# Patient Record
Sex: FEMALE | Race: WHITE | NOT HISPANIC OR LATINO
[De-identification: names, ages, dates, MRNs, and addresses within clinical notes are randomized per-mention and may not be internally consistent; named-entity substitution may affect disease eponyms.]

---

## 2017-01-26 ENCOUNTER — NON-APPOINTMENT (OUTPATIENT)
Age: 77
End: 2017-01-26

## 2017-01-26 ENCOUNTER — APPOINTMENT (OUTPATIENT)
Dept: INTERNAL MEDICINE | Facility: CLINIC | Age: 77
End: 2017-01-26

## 2017-01-26 VITALS — WEIGHT: 133 LBS | BODY MASS INDEX: 22.16 KG/M2 | HEIGHT: 65 IN

## 2017-01-26 DIAGNOSIS — H93.11 TINNITUS, RIGHT EAR: ICD-10-CM

## 2017-02-03 LAB
25(OH)D3 SERPL-MCNC: 55.7 NG/ML
ALBUMIN SERPL ELPH-MCNC: 4.3 G/DL
ALP BLD-CCNC: 50 U/L
ALT SERPL-CCNC: 27 U/L
ANION GAP SERPL CALC-SCNC: 17 MMOL/L
APPEARANCE: CLEAR
AST SERPL-CCNC: 31 U/L
BASOPHILS # BLD AUTO: 0.06 K/UL
BASOPHILS NFR BLD AUTO: 0.7 %
BILIRUB SERPL-MCNC: 0.4 MG/DL
BILIRUBIN URINE: NEGATIVE
BLOOD URINE: NEGATIVE
BUN SERPL-MCNC: 18 MG/DL
CALCIUM SERPL-MCNC: 9.7 MG/DL
CHLORIDE SERPL-SCNC: 104 MMOL/L
CHOLEST SERPL-MCNC: 264 MG/DL
CHOLEST/HDLC SERPL: 1.8 RATIO
CO2 SERPL-SCNC: 22 MMOL/L
COLOR: YELLOW
CREAT SERPL-MCNC: 1.1 MG/DL
CRP SERPL HS-MCNC: 0.7 MG/L
EOSINOPHIL # BLD AUTO: 0.35 K/UL
EOSINOPHIL NFR BLD AUTO: 4 %
GLUCOSE BS SERPL-MCNC: 93 MG/DL
GLUCOSE QUALITATIVE U: NORMAL MG/DL
GLUCOSE SERPL-MCNC: 92 MG/DL
HBA1C MFR BLD HPLC: 5.2 %
HCT VFR BLD CALC: 43.3 %
HDLC SERPL-MCNC: 138 MG/DL
HGB BLD-MCNC: 14 G/DL
IMM GRANULOCYTES NFR BLD AUTO: 0.1 %
KETONES URINE: NEGATIVE
LDLC SERPL CALC-MCNC: 101 MG/DL
LEUKOCYTE ESTERASE URINE: NEGATIVE
LYMPHOCYTES # BLD AUTO: 1.71 K/UL
LYMPHOCYTES NFR BLD AUTO: 19.4 %
MAN DIFF?: NORMAL
MCHC RBC-ENTMCNC: 31.9 PG
MCHC RBC-ENTMCNC: 32.3 GM/DL
MCV RBC AUTO: 98.6 FL
MONOCYTES # BLD AUTO: 0.76 K/UL
MONOCYTES NFR BLD AUTO: 8.6 %
NEUTROPHILS # BLD AUTO: 5.91 K/UL
NEUTROPHILS NFR BLD AUTO: 67.2 %
NITRITE URINE: NEGATIVE
PH URINE: 6
PLATELET # BLD AUTO: 240 K/UL
POTASSIUM SERPL-SCNC: 4.4 MMOL/L
PROT SERPL-MCNC: 7.1 G/DL
PROTEIN URINE: NEGATIVE MG/DL
RBC # BLD: 4.39 M/UL
RBC # FLD: 13.7 %
SODIUM SERPL-SCNC: 143 MMOL/L
SPECIFIC GRAVITY URINE: 1.02
T4 FREE SERPL-MCNC: 1.2 NG/DL
TRIGL SERPL-MCNC: 94 MG/DL
TSH SERPL-ACNC: 4.68 UIU/ML
UROBILINOGEN URINE: NORMAL MG/DL
VIT B12 SERPL-MCNC: 1535 PG/ML
WBC # FLD AUTO: 8.8 K/UL

## 2017-03-16 ENCOUNTER — APPOINTMENT (OUTPATIENT)
Dept: INTERNAL MEDICINE | Facility: CLINIC | Age: 77
End: 2017-03-16

## 2017-03-16 VITALS
HEIGHT: 65 IN | DIASTOLIC BLOOD PRESSURE: 78 MMHG | WEIGHT: 133 LBS | SYSTOLIC BLOOD PRESSURE: 136 MMHG | BODY MASS INDEX: 22.16 KG/M2 | RESPIRATION RATE: 14 BRPM | HEART RATE: 78 BPM

## 2017-03-31 ENCOUNTER — APPOINTMENT (OUTPATIENT)
Dept: OBGYN | Facility: CLINIC | Age: 77
End: 2017-03-31

## 2017-03-31 VITALS
WEIGHT: 136 LBS | SYSTOLIC BLOOD PRESSURE: 132 MMHG | HEIGHT: 65 IN | BODY MASS INDEX: 22.66 KG/M2 | HEART RATE: 70 BPM | DIASTOLIC BLOOD PRESSURE: 85 MMHG | OXYGEN SATURATION: 96 %

## 2017-03-31 RX ORDER — LISINOPRIL 10 MG/1
10 TABLET ORAL DAILY
Qty: 30 | Refills: 1 | Status: DISCONTINUED | COMMUNITY
Start: 2017-01-26 | End: 2017-03-31

## 2017-04-30 ENCOUNTER — FORM ENCOUNTER (OUTPATIENT)
Age: 77
End: 2017-04-30

## 2017-05-01 ENCOUNTER — APPOINTMENT (OUTPATIENT)
Dept: ULTRASOUND IMAGING | Facility: HOSPITAL | Age: 77
End: 2017-05-01

## 2017-05-01 ENCOUNTER — APPOINTMENT (OUTPATIENT)
Dept: MAMMOGRAPHY | Facility: HOSPITAL | Age: 77
End: 2017-05-01

## 2017-05-01 ENCOUNTER — OUTPATIENT (OUTPATIENT)
Dept: OUTPATIENT SERVICES | Facility: HOSPITAL | Age: 77
LOS: 1 days | End: 2017-05-01
Payer: MEDICARE

## 2017-05-01 PROCEDURE — 77063 BREAST TOMOSYNTHESIS BI: CPT

## 2017-05-01 PROCEDURE — 76641 ULTRASOUND BREAST COMPLETE: CPT

## 2017-05-01 PROCEDURE — 77067 SCR MAMMO BI INCL CAD: CPT

## 2017-07-03 ENCOUNTER — APPOINTMENT (OUTPATIENT)
Dept: ORTHOPEDIC SURGERY | Facility: CLINIC | Age: 77
End: 2017-07-03

## 2017-07-03 VITALS
BODY MASS INDEX: 22.33 KG/M2 | SYSTOLIC BLOOD PRESSURE: 162 MMHG | WEIGHT: 134 LBS | DIASTOLIC BLOOD PRESSURE: 81 MMHG | HEIGHT: 65 IN | HEART RATE: 73 BPM

## 2017-07-04 ENCOUNTER — RESULT REVIEW (OUTPATIENT)
Age: 77
End: 2017-07-04

## 2017-07-04 ENCOUNTER — TRANSCRIPTION ENCOUNTER (OUTPATIENT)
Age: 77
End: 2017-07-04

## 2017-07-04 ENCOUNTER — INPATIENT (INPATIENT)
Facility: HOSPITAL | Age: 77
LOS: 4 days | Discharge: HOME | DRG: 331 | End: 2017-07-09
Attending: SURGERY | Admitting: SURGERY
Payer: MEDICARE

## 2017-07-04 DIAGNOSIS — K56.2 VOLVULUS: ICD-10-CM

## 2017-07-04 DIAGNOSIS — I10 ESSENTIAL (PRIMARY) HYPERTENSION: ICD-10-CM

## 2017-07-04 DIAGNOSIS — Z85.038 PERSONAL HISTORY OF OTHER MALIGNANT NEOPLASM OF LARGE INTESTINE: ICD-10-CM

## 2017-07-04 DIAGNOSIS — R10.9 UNSPECIFIED ABDOMINAL PAIN: ICD-10-CM

## 2017-07-04 PROCEDURE — 88307 TISSUE EXAM BY PATHOLOGIST: CPT | Mod: 26

## 2017-07-04 PROCEDURE — 93010 ELECTROCARDIOGRAM REPORT: CPT

## 2017-07-04 PROCEDURE — 99223 1ST HOSP IP/OBS HIGH 75: CPT | Mod: 57,AI

## 2017-07-04 PROCEDURE — 74177 CT ABD & PELVIS W/CONTRAST: CPT | Mod: 26

## 2017-07-04 PROCEDURE — 44140 PARTIAL REMOVAL OF COLON: CPT

## 2017-07-05 PROCEDURE — 99235 HOSP IP/OBS SAME DATE MOD 70: CPT

## 2017-07-06 PROCEDURE — 99233 SBSQ HOSP IP/OBS HIGH 50: CPT

## 2017-07-07 PROCEDURE — 99232 SBSQ HOSP IP/OBS MODERATE 35: CPT

## 2017-07-08 PROCEDURE — 99232 SBSQ HOSP IP/OBS MODERATE 35: CPT

## 2017-07-09 PROCEDURE — 87086 URINE CULTURE/COLONY COUNT: CPT

## 2017-07-09 PROCEDURE — 85730 THROMBOPLASTIN TIME PARTIAL: CPT

## 2017-07-09 PROCEDURE — 74177 CT ABD & PELVIS W/CONTRAST: CPT

## 2017-07-09 PROCEDURE — 87075 CULTR BACTERIA EXCEPT BLOOD: CPT

## 2017-07-09 PROCEDURE — 86850 RBC ANTIBODY SCREEN: CPT

## 2017-07-09 PROCEDURE — 99285 EMERGENCY DEPT VISIT HI MDM: CPT | Mod: 25

## 2017-07-09 PROCEDURE — 97162 PT EVAL MOD COMPLEX 30 MIN: CPT

## 2017-07-09 PROCEDURE — 86900 BLOOD TYPING SEROLOGIC ABO: CPT

## 2017-07-09 PROCEDURE — 80053 COMPREHEN METABOLIC PANEL: CPT

## 2017-07-09 PROCEDURE — 96375 TX/PRO/DX INJ NEW DRUG ADDON: CPT

## 2017-07-09 PROCEDURE — 81003 URINALYSIS AUTO W/O SCOPE: CPT

## 2017-07-09 PROCEDURE — 99232 SBSQ HOSP IP/OBS MODERATE 35: CPT

## 2017-07-09 PROCEDURE — 85610 PROTHROMBIN TIME: CPT

## 2017-07-09 PROCEDURE — 82150 ASSAY OF AMYLASE: CPT

## 2017-07-09 PROCEDURE — 86922 COMPATIBILITY TEST ANTIGLOB: CPT

## 2017-07-09 PROCEDURE — 83690 ASSAY OF LIPASE: CPT

## 2017-07-09 PROCEDURE — 93005 ELECTROCARDIOGRAM TRACING: CPT

## 2017-07-09 PROCEDURE — 87205 SMEAR GRAM STAIN: CPT

## 2017-07-09 PROCEDURE — 87070 CULTURE OTHR SPECIMN AEROBIC: CPT

## 2017-07-09 PROCEDURE — 96365 THER/PROPH/DIAG IV INF INIT: CPT | Mod: XU

## 2017-07-09 PROCEDURE — 88307 TISSUE EXAM BY PATHOLOGIST: CPT

## 2017-07-09 PROCEDURE — 85027 COMPLETE CBC AUTOMATED: CPT

## 2017-07-09 PROCEDURE — 80069 RENAL FUNCTION PANEL: CPT

## 2017-07-17 ENCOUNTER — APPOINTMENT (OUTPATIENT)
Dept: SURGERY | Facility: CLINIC | Age: 77
End: 2017-07-17

## 2017-07-17 VITALS — BODY MASS INDEX: 21.66 KG/M2 | HEIGHT: 65 IN | WEIGHT: 130 LBS

## 2017-07-28 ENCOUNTER — APPOINTMENT (OUTPATIENT)
Dept: INTERNAL MEDICINE | Facility: CLINIC | Age: 77
End: 2017-07-28
Payer: MEDICARE

## 2017-07-28 VITALS
BODY MASS INDEX: 21.49 KG/M2 | WEIGHT: 129 LBS | HEIGHT: 65 IN | HEART RATE: 72 BPM | RESPIRATION RATE: 14 BRPM | DIASTOLIC BLOOD PRESSURE: 76 MMHG | SYSTOLIC BLOOD PRESSURE: 134 MMHG

## 2017-07-28 PROCEDURE — 99215 OFFICE O/P EST HI 40 MIN: CPT

## 2017-08-09 ENCOUNTER — RX RENEWAL (OUTPATIENT)
Age: 77
End: 2017-08-09

## 2017-08-21 ENCOUNTER — OUTPATIENT (OUTPATIENT)
Dept: OUTPATIENT SERVICES | Facility: HOSPITAL | Age: 77
LOS: 1 days | End: 2017-08-21
Payer: MEDICARE

## 2017-08-21 ENCOUNTER — APPOINTMENT (OUTPATIENT)
Dept: SURGERY | Facility: CLINIC | Age: 77
End: 2017-08-21
Payer: MEDICARE

## 2017-08-21 VITALS
OXYGEN SATURATION: 99 % | RESPIRATION RATE: 18 BRPM | SYSTOLIC BLOOD PRESSURE: 146 MMHG | TEMPERATURE: 98 F | HEART RATE: 75 BPM | WEIGHT: 134.04 LBS | DIASTOLIC BLOOD PRESSURE: 82 MMHG | HEIGHT: 65 IN

## 2017-08-21 DIAGNOSIS — M72.0 PALMAR FASCIAL FIBROMATOSIS [DUPUYTREN]: ICD-10-CM

## 2017-08-21 DIAGNOSIS — I10 ESSENTIAL (PRIMARY) HYPERTENSION: ICD-10-CM

## 2017-08-21 DIAGNOSIS — Z90.89 ACQUIRED ABSENCE OF OTHER ORGANS: Chronic | ICD-10-CM

## 2017-08-21 DIAGNOSIS — Z90.49 ACQUIRED ABSENCE OF OTHER SPECIFIED PARTS OF DIGESTIVE TRACT: Chronic | ICD-10-CM

## 2017-08-21 DIAGNOSIS — Z01.818 ENCOUNTER FOR OTHER PREPROCEDURAL EXAMINATION: ICD-10-CM

## 2017-08-21 LAB
ANION GAP SERPL CALC-SCNC: 16 MMOL/L — SIGNIFICANT CHANGE UP (ref 5–17)
BUN SERPL-MCNC: 24 MG/DL — HIGH (ref 7–23)
CALCIUM SERPL-MCNC: 10.1 MG/DL — SIGNIFICANT CHANGE UP (ref 8.4–10.5)
CHLORIDE SERPL-SCNC: 103 MMOL/L — SIGNIFICANT CHANGE UP (ref 96–108)
CO2 SERPL-SCNC: 24 MMOL/L — SIGNIFICANT CHANGE UP (ref 22–31)
CREAT SERPL-MCNC: 1.03 MG/DL — SIGNIFICANT CHANGE UP (ref 0.5–1.3)
GLUCOSE SERPL-MCNC: 91 MG/DL — SIGNIFICANT CHANGE UP (ref 70–99)
HCT VFR BLD CALC: 39.5 % — SIGNIFICANT CHANGE UP (ref 34.5–45)
HGB BLD-MCNC: 12.9 G/DL — SIGNIFICANT CHANGE UP (ref 11.5–15.5)
MCHC RBC-ENTMCNC: 31.7 PG — SIGNIFICANT CHANGE UP (ref 27–34)
MCHC RBC-ENTMCNC: 32.7 GM/DL — SIGNIFICANT CHANGE UP (ref 32–36)
MCV RBC AUTO: 97.1 FL — SIGNIFICANT CHANGE UP (ref 80–100)
PLATELET # BLD AUTO: 305 K/UL — SIGNIFICANT CHANGE UP (ref 150–400)
POTASSIUM SERPL-MCNC: 4.9 MMOL/L — SIGNIFICANT CHANGE UP (ref 3.5–5.3)
POTASSIUM SERPL-SCNC: 4.9 MMOL/L — SIGNIFICANT CHANGE UP (ref 3.5–5.3)
RBC # BLD: 4.07 M/UL — SIGNIFICANT CHANGE UP (ref 3.8–5.2)
RBC # FLD: 15 % — HIGH (ref 10.3–14.5)
SODIUM SERPL-SCNC: 143 MMOL/L — SIGNIFICANT CHANGE UP (ref 135–145)
WBC # BLD: 6.98 K/UL — SIGNIFICANT CHANGE UP (ref 3.8–10.5)
WBC # FLD AUTO: 6.98 K/UL — SIGNIFICANT CHANGE UP (ref 3.8–10.5)

## 2017-08-21 PROCEDURE — 80048 BASIC METABOLIC PNL TOTAL CA: CPT

## 2017-08-21 PROCEDURE — 99024 POSTOP FOLLOW-UP VISIT: CPT

## 2017-08-21 PROCEDURE — 85027 COMPLETE CBC AUTOMATED: CPT

## 2017-08-21 RX ORDER — SODIUM CHLORIDE 9 MG/ML
3 INJECTION INTRAMUSCULAR; INTRAVENOUS; SUBCUTANEOUS EVERY 8 HOURS
Qty: 0 | Refills: 0 | Status: DISCONTINUED | OUTPATIENT
Start: 2017-08-28 | End: 2017-09-12

## 2017-08-21 RX ORDER — LIDOCAINE HCL 20 MG/ML
0.2 VIAL (ML) INJECTION DAILY
Qty: 0 | Refills: 0 | Status: DISCONTINUED | OUTPATIENT
Start: 2017-08-28 | End: 2017-09-12

## 2017-08-21 NOTE — H&P PST ADULT - PMH
Colon cancer  2002 had chemo.  HTN (hypertension)  x 3 years, controlled Colon cancer  2002 had chemo.  HTN (hypertension)  x 3 years, controlled  Volvulus of ascending colon  7/2017  s/p colon surgery Colon cancer  2002 had chemo.  Hard of hearing    HTN (hypertension)  x 3 years, controlled  Volvulus of ascending colon  7/2017  s/p colon surgery

## 2017-08-21 NOTE — H&P PST ADULT - ATTENDING COMMENTS
The patient has Dupuytren's contracture left hand with ring finger PIP and MP contractures that interferes with activity of daily living. Patient enjoys piano playing and reports that the contracture is increasingly problematic. Because of the interference with ADL and piano playing, the patient is indicated for surgery.    The details of surgery, treatment alternatives, and the associated risks, complications, limitations, and expectations have been reviewed and discussed with the patient. Problems with skin and wound healing, risks of infection, recurrence of contracture, nerve injury, tendon injury, need for hand therapy program, need for splinting, postop pain, flare reactions, CRPS are just a few of many risks and complications and other factors that have been discussed. All questions have been answered. The patient has expressed acceptance and understanding. Surgery will be scheduled under regional anesthesia at a time of patient convenience.

## 2017-08-28 ENCOUNTER — TRANSCRIPTION ENCOUNTER (OUTPATIENT)
Age: 77
End: 2017-08-28

## 2017-08-28 ENCOUNTER — RESULT REVIEW (OUTPATIENT)
Age: 77
End: 2017-08-28

## 2017-08-28 ENCOUNTER — APPOINTMENT (OUTPATIENT)
Dept: ORTHOPEDIC SURGERY | Facility: HOSPITAL | Age: 77
End: 2017-08-28

## 2017-08-28 ENCOUNTER — OUTPATIENT (OUTPATIENT)
Dept: OUTPATIENT SERVICES | Facility: HOSPITAL | Age: 77
LOS: 1 days | End: 2017-08-28
Payer: MEDICARE

## 2017-08-28 VITALS — HEIGHT: 65 IN | WEIGHT: 134.04 LBS

## 2017-08-28 VITALS — TEMPERATURE: 98 F

## 2017-08-28 DIAGNOSIS — Z90.89 ACQUIRED ABSENCE OF OTHER ORGANS: Chronic | ICD-10-CM

## 2017-08-28 DIAGNOSIS — M72.0 PALMAR FASCIAL FIBROMATOSIS [DUPUYTREN]: ICD-10-CM

## 2017-08-28 DIAGNOSIS — Z90.49 ACQUIRED ABSENCE OF OTHER SPECIFIED PARTS OF DIGESTIVE TRACT: Chronic | ICD-10-CM

## 2017-08-28 PROCEDURE — 88305 TISSUE EXAM BY PATHOLOGIST: CPT

## 2017-08-28 PROCEDURE — 26123 RELEASE PALM CONTRACTURE: CPT | Mod: F3

## 2017-08-28 PROCEDURE — 88305 TISSUE EXAM BY PATHOLOGIST: CPT | Mod: 26

## 2017-08-28 PROCEDURE — 26123 RELEASE PALM CONTRACTURE: CPT | Mod: F3,LT

## 2017-08-28 RX ORDER — CELECOXIB 200 MG/1
1 CAPSULE ORAL
Qty: 0 | Refills: 0 | DISCHARGE
Start: 2017-08-28

## 2017-08-28 RX ORDER — SODIUM CHLORIDE 9 MG/ML
1000 INJECTION, SOLUTION INTRAVENOUS
Qty: 0 | Refills: 0 | Status: DISCONTINUED | OUTPATIENT
Start: 2017-08-28 | End: 2017-09-12

## 2017-08-28 RX ORDER — OXYCODONE HYDROCHLORIDE 5 MG/1
1 TABLET ORAL
Qty: 0 | Refills: 0 | DISCHARGE
Start: 2017-08-28

## 2017-08-28 RX ORDER — AMLODIPINE BESYLATE 2.5 MG/1
1 TABLET ORAL
Qty: 0 | Refills: 0 | COMMUNITY

## 2017-08-28 RX ORDER — CELECOXIB 200 MG/1
200 CAPSULE ORAL ONCE
Qty: 0 | Refills: 0 | Status: DISCONTINUED | OUTPATIENT
Start: 2017-08-28 | End: 2017-09-12

## 2017-08-28 RX ORDER — ONDANSETRON 8 MG/1
4 TABLET, FILM COATED ORAL ONCE
Qty: 0 | Refills: 0 | Status: DISCONTINUED | OUTPATIENT
Start: 2017-08-28 | End: 2017-09-12

## 2017-08-28 RX ORDER — OXYCODONE HYDROCHLORIDE 5 MG/1
5 TABLET ORAL ONCE
Qty: 0 | Refills: 0 | Status: DISCONTINUED | OUTPATIENT
Start: 2017-08-28 | End: 2017-08-28

## 2017-08-28 NOTE — ASU DISCHARGE PLAN (ADULT/PEDIATRIC). - NOTIFY
Numbness, color, or temperature change to extremity/Pain not relieved by Medications/Persistent Nausea and Vomiting/Bleeding that does not stop/Fever greater than 101/Numbness, tingling/Inability to Tolerate Liquids or Foods Persistent Nausea and Vomiting/Fever greater than 101/Inability to Tolerate Liquids or Foods/Numbness, tingling/Pain not relieved by Medications/Bleeding that does not stop/Unable to Urinate/Numbness, color, or temperature change to extremity

## 2017-08-28 NOTE — BRIEF OPERATIVE NOTE - OPERATION/FINDINGS
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

## 2017-08-28 NOTE — ASU PATIENT PROFILE, ADULT - PMH
Colon cancer  2002 had chemo.  Hard of hearing    HTN (hypertension)  x 3 years, controlled  Volvulus of ascending colon  7/2017  s/p colon surgery

## 2017-08-31 LAB — SURGICAL PATHOLOGY STUDY: SIGNIFICANT CHANGE UP

## 2017-09-07 ENCOUNTER — APPOINTMENT (OUTPATIENT)
Dept: INTERNAL MEDICINE | Facility: CLINIC | Age: 77
End: 2017-09-07
Payer: MEDICARE

## 2017-09-07 VITALS
BODY MASS INDEX: 21.66 KG/M2 | SYSTOLIC BLOOD PRESSURE: 128 MMHG | HEART RATE: 68 BPM | DIASTOLIC BLOOD PRESSURE: 78 MMHG | HEIGHT: 65 IN | WEIGHT: 130 LBS | RESPIRATION RATE: 14 BRPM

## 2017-09-07 PROCEDURE — 99215 OFFICE O/P EST HI 40 MIN: CPT

## 2017-09-07 RX ORDER — AMLODIPINE BESYLATE 2.5 MG/1
2.5 TABLET ORAL
Qty: 90 | Refills: 1 | Status: DISCONTINUED | COMMUNITY
Start: 2017-07-09 | End: 2017-09-07

## 2017-09-07 RX ORDER — LISINOPRIL 20 MG/1
20 TABLET ORAL DAILY
Qty: 90 | Refills: 1 | Status: DISCONTINUED | COMMUNITY
Start: 2017-03-16 | End: 2017-09-07

## 2017-09-11 ENCOUNTER — APPOINTMENT (OUTPATIENT)
Dept: SURGERY | Facility: CLINIC | Age: 77
End: 2017-09-11
Payer: MEDICARE

## 2017-09-11 PROCEDURE — 99214 OFFICE O/P EST MOD 30 MIN: CPT | Mod: 24

## 2017-09-12 ENCOUNTER — APPOINTMENT (OUTPATIENT)
Dept: ORTHOPEDIC SURGERY | Facility: CLINIC | Age: 77
End: 2017-09-12
Payer: MEDICARE

## 2017-09-12 DIAGNOSIS — M72.0 PALMAR FASCIAL FIBROMATOSIS [DUPUYTREN]: ICD-10-CM

## 2017-09-12 PROCEDURE — 99024 POSTOP FOLLOW-UP VISIT: CPT

## 2017-09-25 ENCOUNTER — APPOINTMENT (OUTPATIENT)
Dept: SURGERY | Facility: CLINIC | Age: 77
End: 2017-09-25

## 2018-03-16 ENCOUNTER — APPOINTMENT (OUTPATIENT)
Dept: INTERNAL MEDICINE | Facility: CLINIC | Age: 78
End: 2018-03-16
Payer: MEDICARE

## 2018-03-16 VITALS — WEIGHT: 139 LBS | HEIGHT: 63 IN | BODY MASS INDEX: 24.63 KG/M2

## 2018-03-16 VITALS — RESPIRATION RATE: 15 BRPM | HEART RATE: 72 BPM | DIASTOLIC BLOOD PRESSURE: 74 MMHG | SYSTOLIC BLOOD PRESSURE: 124 MMHG

## 2018-03-16 DIAGNOSIS — M72.0 PALMAR FASCIAL FIBROMATOSIS [DUPUYTREN]: ICD-10-CM

## 2018-03-16 DIAGNOSIS — Z78.9 OTHER SPECIFIED HEALTH STATUS: ICD-10-CM

## 2018-03-16 PROCEDURE — 99215 OFFICE O/P EST HI 40 MIN: CPT

## 2018-03-16 RX ORDER — OXYCODONE AND ACETAMINOPHEN 5; 325 MG/1; MG/1
5-325 TABLET ORAL
Qty: 16 | Refills: 0 | Status: DISCONTINUED | COMMUNITY
Start: 2017-07-09

## 2018-03-19 ENCOUNTER — LABORATORY RESULT (OUTPATIENT)
Age: 78
End: 2018-03-19

## 2018-03-27 LAB
25(OH)D3 SERPL-MCNC: 44.8 NG/ML
ALBUMIN SERPL ELPH-MCNC: 4.3 G/DL
ALP BLD-CCNC: 45 U/L
ALT SERPL-CCNC: 19 U/L
ANION GAP SERPL CALC-SCNC: 26 MMOL/L
APPEARANCE: CLEAR
AST SERPL-CCNC: 32 U/L
BASOPHILS # BLD AUTO: 0.03 K/UL
BASOPHILS NFR BLD AUTO: 0.4 %
BILIRUB SERPL-MCNC: 0.4 MG/DL
BILIRUBIN URINE: NEGATIVE
BLOOD URINE: NEGATIVE
BUN SERPL-MCNC: 21 MG/DL
CALCIUM SERPL-MCNC: 9.7 MG/DL
CHLORIDE SERPL-SCNC: 104 MMOL/L
CHOLEST SERPL-MCNC: 251 MG/DL
CHOLEST/HDLC SERPL: 1.9 RATIO
CO2 SERPL-SCNC: 13 MMOL/L
COLOR: ABNORMAL
CREAT SERPL-MCNC: 1.04 MG/DL
CRP SERPL HS-MCNC: 0.4 MG/L
EOSINOPHIL # BLD AUTO: 0.32 K/UL
EOSINOPHIL NFR BLD AUTO: 4.3 %
GLUCOSE BS SERPL-MCNC: 88 MG/DL
GLUCOSE QUALITATIVE U: NEGATIVE MG/DL
GLUCOSE SERPL-MCNC: 90 MG/DL
HBA1C MFR BLD HPLC: 5.2 %
HCT VFR BLD CALC: 40.8 %
HDLC SERPL-MCNC: 132 MG/DL
HGB BLD-MCNC: 13.8 G/DL
IMM GRANULOCYTES NFR BLD AUTO: 0.3 %
KETONES URINE: NEGATIVE
LDLC SERPL CALC-MCNC: 102 MG/DL
LEUKOCYTE ESTERASE URINE: NEGATIVE
LYMPHOCYTES # BLD AUTO: 1.72 K/UL
LYMPHOCYTES NFR BLD AUTO: 23.1 %
MAN DIFF?: NORMAL
MCHC RBC-ENTMCNC: 32.9 PG
MCHC RBC-ENTMCNC: 33.8 GM/DL
MCV RBC AUTO: 97.1 FL
MONOCYTES # BLD AUTO: 0.85 K/UL
MONOCYTES NFR BLD AUTO: 11.4 %
NEUTROPHILS # BLD AUTO: 4.51 K/UL
NEUTROPHILS NFR BLD AUTO: 60.5 %
NITRITE URINE: NEGATIVE
PH URINE: 6
PLATELET # BLD AUTO: 214 K/UL
POTASSIUM SERPL-SCNC: 4.7 MMOL/L
PROT SERPL-MCNC: 7.1 G/DL
PROTEIN URINE: ABNORMAL MG/DL
RBC # BLD: 4.2 M/UL
RBC # FLD: 14.1 %
SODIUM SERPL-SCNC: 143 MMOL/L
SPECIFIC GRAVITY URINE: 1.02
T4 FREE SERPL-MCNC: 1.1 NG/DL
TRIGL SERPL-MCNC: 86 MG/DL
TSH SERPL-ACNC: 5.08 UIU/ML
UROBILINOGEN URINE: NEGATIVE MG/DL
VIT B12 SERPL-MCNC: 1522 PG/ML
WBC # FLD AUTO: 7.45 K/UL

## 2018-04-04 ENCOUNTER — APPOINTMENT (OUTPATIENT)
Dept: OBGYN | Facility: CLINIC | Age: 78
End: 2018-04-04
Payer: MEDICARE

## 2018-04-04 VITALS
DIASTOLIC BLOOD PRESSURE: 78 MMHG | RESPIRATION RATE: 16 BRPM | OXYGEN SATURATION: 97 % | HEIGHT: 63 IN | BODY MASS INDEX: 24.63 KG/M2 | HEART RATE: 66 BPM | WEIGHT: 139 LBS | SYSTOLIC BLOOD PRESSURE: 130 MMHG

## 2018-04-04 PROCEDURE — G0101: CPT

## 2018-04-06 LAB — CYTOLOGY CVX/VAG DOC THIN PREP: NORMAL

## 2018-05-07 ENCOUNTER — FORM ENCOUNTER (OUTPATIENT)
Age: 78
End: 2018-05-07

## 2018-05-08 ENCOUNTER — APPOINTMENT (OUTPATIENT)
Dept: MAMMOGRAPHY | Facility: HOSPITAL | Age: 78
End: 2018-05-08
Payer: MEDICARE

## 2018-05-08 ENCOUNTER — OUTPATIENT (OUTPATIENT)
Dept: OUTPATIENT SERVICES | Facility: HOSPITAL | Age: 78
LOS: 1 days | End: 2018-05-08
Payer: MEDICARE

## 2018-05-08 ENCOUNTER — APPOINTMENT (OUTPATIENT)
Dept: ULTRASOUND IMAGING | Facility: HOSPITAL | Age: 78
End: 2018-05-08
Payer: MEDICARE

## 2018-05-08 DIAGNOSIS — Z90.49 ACQUIRED ABSENCE OF OTHER SPECIFIED PARTS OF DIGESTIVE TRACT: Chronic | ICD-10-CM

## 2018-05-08 DIAGNOSIS — Z90.89 ACQUIRED ABSENCE OF OTHER ORGANS: Chronic | ICD-10-CM

## 2018-05-08 DIAGNOSIS — Z00.8 ENCOUNTER FOR OTHER GENERAL EXAMINATION: ICD-10-CM

## 2018-05-08 DIAGNOSIS — Z12.31 ENCOUNTER FOR SCREENING MAMMOGRAM FOR MALIGNANT NEOPLASM OF BREAST: ICD-10-CM

## 2018-05-08 DIAGNOSIS — R92.2 INCONCLUSIVE MAMMOGRAM: ICD-10-CM

## 2018-05-08 PROCEDURE — 77067 SCR MAMMO BI INCL CAD: CPT

## 2018-05-08 PROCEDURE — 76641 ULTRASOUND BREAST COMPLETE: CPT | Mod: 26

## 2018-05-08 PROCEDURE — 77067 SCR MAMMO BI INCL CAD: CPT | Mod: 26

## 2018-05-08 PROCEDURE — 77063 BREAST TOMOSYNTHESIS BI: CPT | Mod: 26

## 2018-05-08 PROCEDURE — 77063 BREAST TOMOSYNTHESIS BI: CPT

## 2018-05-08 PROCEDURE — 76641 ULTRASOUND BREAST COMPLETE: CPT

## 2018-07-16 PROBLEM — K56.2 VOLVULUS: Chronic | Status: ACTIVE | Noted: 2017-08-21

## 2018-09-10 ENCOUNTER — MEDICATION RENEWAL (OUTPATIENT)
Age: 78
End: 2018-09-10

## 2019-02-25 PROBLEM — C18.9 MALIGNANT NEOPLASM OF COLON, UNSPECIFIED: Chronic | Status: ACTIVE | Noted: 2017-08-21

## 2019-02-25 PROBLEM — I10 ESSENTIAL (PRIMARY) HYPERTENSION: Chronic | Status: ACTIVE | Noted: 2017-08-21

## 2019-02-25 PROBLEM — H91.90 UNSPECIFIED HEARING LOSS, UNSPECIFIED EAR: Chronic | Status: ACTIVE | Noted: 2017-08-21

## 2019-03-04 ENCOUNTER — APPOINTMENT (OUTPATIENT)
Dept: INTERNAL MEDICINE | Facility: CLINIC | Age: 79
End: 2019-03-04
Payer: MEDICARE

## 2019-03-04 ENCOUNTER — NON-APPOINTMENT (OUTPATIENT)
Age: 79
End: 2019-03-04

## 2019-03-04 ENCOUNTER — TRANSCRIPTION ENCOUNTER (OUTPATIENT)
Age: 79
End: 2019-03-04

## 2019-03-04 VITALS
DIASTOLIC BLOOD PRESSURE: 80 MMHG | BODY MASS INDEX: 24.27 KG/M2 | TEMPERATURE: 98 F | HEART RATE: 68 BPM | SYSTOLIC BLOOD PRESSURE: 140 MMHG | HEIGHT: 63 IN | OXYGEN SATURATION: 99 % | WEIGHT: 137 LBS | RESPIRATION RATE: 16 BRPM

## 2019-03-04 PROCEDURE — 93000 ELECTROCARDIOGRAM COMPLETE: CPT | Mod: 59

## 2019-03-04 PROCEDURE — G0439: CPT

## 2019-03-04 NOTE — REVIEW OF SYSTEMS
[Diarrhea] : diarrhea [Joint Stiffness] : joint stiffness [Negative] : Heme/Lymph [Fever] : no fever [Chills] : no chills [Fatigue] : no fatigue [Hot Flashes] : no hot flashes [Night Sweats] : no night sweats [Recent Change In Weight] : ~T no recent weight change [Discharge] : no discharge [Pain] : no pain [Redness] : no redness [Dryness] : no dryness  [Vision Problems] : no vision problems [Itching] : no itching [Earache] : no earache [Hearing Loss] : hearing loss [Nosebleed] : no nosebleeds [Hoarseness] : no hoarseness [Nasal Discharge] : no nasal discharge [Sore Throat] : no sore throat [Chest Pain] : no chest pain [Palpitations] : no palpitations [Leg Claudication] : no leg claudication [Lower Ext Edema] : no lower extremity edema [Orthopnea] : no orthopnea [Paroysmal Nocturnal Dyspnea] : no paroysmal nocturnal dyspnea [Shortness Of Breath] : no shortness of breath [Wheezing] : no wheezing [Cough] : no cough [Dyspnea on Exertion] : no dyspnea on exertion [Abdominal Pain] : no abdominal pain [Nausea] : no nausea [Constipation] : no constipation [Vomiting] : no vomiting [Heartburn] : no heartburn [Melena] : no melena [Dysuria] : no dysuria [Incontinence] : no incontinence [Poor Libido] : libido not poor [Hematuria] : no hematuria [Frequency] : no frequency [Vaginal Discharge] : no vaginal discharge [Joint Pain] : no joint pain [Joint Swelling] : no joint swelling [Muscle Weakness] : no muscle weakness [Muscle Pain] : no muscle pain [Back Pain] : no back pain [Mole Changes] : no mole changes [Nail Changes] : no nail changes [Hair Changes] : no hair changes [Skin Rash] : no skin rash [Headache] : no headache [Dizziness] : no dizziness [Fainting] : no fainting [Confusion] : no confusion [Memory Loss] : no memory loss [Unsteady Walking] : no ataxia [Suicidal] : not suicidal [Insomnia] : no insomnia [Anxiety] : no anxiety [Depression] : no depression [Easy Bleeding] : no easy bleeding [Easy Bruising] : no easy bruising [Swollen Glands] : no swollen glands

## 2019-03-04 NOTE — PHYSICAL EXAM
[No Acute Distress] : no acute distress [Well Nourished] : well nourished [Well Developed] : well developed [Well-Appearing] : well-appearing [Normal Voice/Communication] : normal voice/communication [Normal Sclera/Conjunctiva] : normal sclera/conjunctiva [PERRL] : pupils equal round and reactive to light [EOMI] : extraocular movements intact [Normal Outer Ear/Nose] : the outer ears and nose were normal in appearance [Normal Oropharynx] : the oropharynx was normal [Normal TMs] : both tympanic membranes were normal [No JVD] : no jugular venous distention [Supple] : supple [No Lymphadenopathy] : no lymphadenopathy [Thyroid Normal, No Nodules] : the thyroid was normal and there were no nodules present [No Respiratory Distress] : no respiratory distress  [Clear to Auscultation] : lungs were clear to auscultation bilaterally [No Accessory Muscle Use] : no accessory muscle use [Normal Rate] : normal rate  [Regular Rhythm] : with a regular rhythm [Normal S1, S2] : normal S1 and S2 [No Murmur] : no murmur heard [No Carotid Bruits] : no carotid bruits [No Abdominal Bruit] : a ~M bruit was not heard ~T in the abdomen [No Varicosities] : no varicosities [Pedal Pulses Present] : the pedal pulses are present [No Edema] : there was no peripheral edema [No Extremity Clubbing/Cyanosis] : no extremity clubbing/cyanosis [No Palpable Aorta] : no palpable aorta [Declined Breast Exam] : declined breast exam  [Soft] : abdomen soft [Non Tender] : non-tender [Non-distended] : non-distended [No Masses] : no abdominal mass palpated [No HSM] : no HSM [Normal Bowel Sounds] : normal bowel sounds [Declined Rectal Exam] : declined rectal exam [Normal Supraclavicular Nodes] : no supraclavicular lymphadenopathy [Normal Axillary Nodes] : no axillary lymphadenopathy [Normal Posterior Cervical Nodes] : no posterior cervical lymphadenopathy [Normal Femoral Nodes] : no femoral lymphadenopathy [Normal Anterior Cervical Nodes] : no anterior cervical lymphadenopathy [Normal Inguinal Nodes] : no inguinal lymphadenopathy [No CVA Tenderness] : no CVA  tenderness [No Spinal Tenderness] : no spinal tenderness [No Joint Swelling] : no joint swelling [Grossly Normal Strength/Tone] : grossly normal strength/tone [No Rash] : no rash [No Skin Lesions] : no skin lesions [Normal Gait] : normal gait [Coordination Grossly Intact] : coordination grossly intact [No Focal Deficits] : no focal deficits [Deep Tendon Reflexes (DTR)] : deep tendon reflexes were 2+ and symmetric [Speech Grossly Normal] : speech grossly normal [Memory Grossly Normal] : memory grossly normal [Normal Affect] : the affect was normal [Alert and Oriented x3] : oriented to person, place, and time [Normal Mood] : the mood was normal [Normal Insight/Judgement] : insight and judgment were intact [Kyphosis] : no kyphosis [Scoliosis] : no scoliosis [Acne] : no acne

## 2019-03-04 NOTE — HISTORY OF PRESENT ILLNESS
[FreeTextEntry1] : Comes to the office for a comprehensive physical examination she is using her medications and discuss her overall health recent regulation of her bowels since surgery [de-identified] : 78-year-old woman comes to the office for a comprehensive physical examination with a history of colon cancer hypertension bilateral hearing loss status post surgery for volvulus or her ascending colon. Patient's bowels have been regular and without discomfort the surgery of her volvulus she denies temperature chills sweats or myalgias she's had no headache sinus congestion sore throat cough wheezing pleurisy chest pain shortness of breath exertional dyspnea lightheadedness dizziness vertigo syncope she has had no abdominal pain no nausea no vomiting denies diarrhea the patient regular blood per rectum or black stools her appetite has been good her weight is stable she denies significant musculoskeletal complaints

## 2019-03-04 NOTE — ASSESSMENT
[FreeTextEntry1] : Physical examination reveals a well-developed female in no acute distress her blood pressure was 140/80 height of 5 foot 3 inches weight 137 pounds temperature 90.8°F orally pulse is 68 respiratory rate of 15 HEENT was unremarkable chest was clear cardiovascular exam shows a normal S1-S2 with no extra heart sounds or murmurs abdomen was soft and nontender with no hepatosplenomegaly extremities showed no clubbing cyanosis or edema neurologic exam was nonfocal EKG showed a normal sinus rhythm there was poor R-wave progression which is chronic there was no acute ST-T wave changes patient was given the name of an  audiologist as well as a dermatologist she will inquire with her pharmacy about obtaining the knees shingles vaccine she is deferring on any endoscopic procedures to follow her colon cancer but does wish to send off the stool base and DNA test. Her blood pressure is well controlled on present medications she will return to the office in 6 months time

## 2019-03-04 NOTE — HEALTH RISK ASSESSMENT
[No falls in past year] : Patient reported no falls in the past year [0] : 2) Feeling down, depressed, or hopeless: Not at all (0) [[id number]] : 0 [MammogramDate] : 05/18 [BoneDensityDate] : 04/14

## 2019-03-05 LAB
APPEARANCE: CLEAR
BILIRUBIN URINE: NEGATIVE
BLOOD URINE: NEGATIVE
COLOR: YELLOW
GLUCOSE QUALITATIVE U: NEGATIVE
KETONES URINE: NEGATIVE
LEUKOCYTE ESTERASE URINE: NEGATIVE
NITRITE URINE: NEGATIVE
PH URINE: 6.5
PROTEIN URINE: NORMAL
SPECIFIC GRAVITY URINE: 1.02
UROBILINOGEN URINE: NORMAL

## 2019-03-06 LAB
25(OH)D3 SERPL-MCNC: 47.2 NG/ML
ALBUMIN SERPL ELPH-MCNC: 4.7 G/DL
ALP BLD-CCNC: 60 U/L
ALT SERPL-CCNC: 28 U/L
ANION GAP SERPL CALC-SCNC: 16 MMOL/L
AST SERPL-CCNC: 35 U/L
BASOPHILS # BLD AUTO: 0.07 K/UL
BASOPHILS NFR BLD AUTO: 0.8 %
BILIRUB SERPL-MCNC: 0.5 MG/DL
BUN SERPL-MCNC: 20 MG/DL
CALCIUM SERPL-MCNC: 9.7 MG/DL
CHLORIDE SERPL-SCNC: 105 MMOL/L
CHOLEST SERPL-MCNC: 237 MG/DL
CHOLEST/HDLC SERPL: 1.9 RATIO
CO2 SERPL-SCNC: 21 MMOL/L
CREAT SERPL-MCNC: 0.9 MG/DL
CRP SERPL HS-MCNC: 1.14 MG/L
EOSINOPHIL # BLD AUTO: 0.24 K/UL
EOSINOPHIL NFR BLD AUTO: 2.8 %
GLUCOSE BS SERPL-MCNC: 88 MG/DL
GLUCOSE SERPL-MCNC: 75 MG/DL
HBA1C MFR BLD HPLC: 5.2 %
HCT VFR BLD CALC: 42.3 %
HDLC SERPL-MCNC: >120 MG/DL
HGB BLD-MCNC: 13.6 G/DL
IMM GRANULOCYTES NFR BLD AUTO: 0.5 %
LDLC SERPL CALC-MCNC: 98 MG/DL
LYMPHOCYTES # BLD AUTO: 1.84 K/UL
LYMPHOCYTES NFR BLD AUTO: 21.6 %
MAN DIFF?: NORMAL
MCHC RBC-ENTMCNC: 32.2 GM/DL
MCHC RBC-ENTMCNC: 32.2 PG
MCV RBC AUTO: 100.2 FL
MONOCYTES # BLD AUTO: 0.86 K/UL
MONOCYTES NFR BLD AUTO: 10.1 %
NEUTROPHILS # BLD AUTO: 5.48 K/UL
NEUTROPHILS NFR BLD AUTO: 64.2 %
PLATELET # BLD AUTO: 230 K/UL
POTASSIUM SERPL-SCNC: 4.8 MMOL/L
PROT SERPL-MCNC: 7.4 G/DL
RBC # BLD: 4.22 M/UL
RBC # FLD: 13.6 %
SODIUM SERPL-SCNC: 142 MMOL/L
T4 FREE SERPL-MCNC: 1.2 NG/DL
TRIGL SERPL-MCNC: 74 MG/DL
TSH SERPL-ACNC: 5.69 UIU/ML
VIT B12 SERPL-MCNC: 1621 PG/ML
WBC # FLD AUTO: 8.53 K/UL

## 2019-04-10 ENCOUNTER — APPOINTMENT (OUTPATIENT)
Dept: OBGYN | Facility: CLINIC | Age: 79
End: 2019-04-10
Payer: MEDICARE

## 2019-04-10 VITALS
HEIGHT: 63 IN | HEART RATE: 61 BPM | WEIGHT: 134 LBS | DIASTOLIC BLOOD PRESSURE: 70 MMHG | BODY MASS INDEX: 23.74 KG/M2 | OXYGEN SATURATION: 98 % | SYSTOLIC BLOOD PRESSURE: 122 MMHG

## 2019-04-10 PROCEDURE — G0101: CPT

## 2019-04-10 NOTE — PHYSICAL EXAM
[Awake] : awake [Alert] : alert [LAD] : no lymphadenopathy [Acute Distress] : no acute distress [Mass] : no breast mass [Thyroid Nodule] : no thyroid nodule [Goiter] : no goiter [Nipple Discharge] : no nipple discharge [Axillary LAD] : no axillary lymphadenopathy [Tender] : non tender [Distended] : not distended [H/Smegaly] : no hepatosplenomegaly [Oriented x3] : oriented to person, place, and time [Depressed Mood] : not depressed [No Lesions] : no genitalia lesions [Flat Affect] : affect not flat [Vulvar Atrophy] : no vulvar atrophy [Vulvitis] : no vulvitis [Labia Minora Erythema] : no erythema of the labia minora [Labia Majora Erythema] : no erythema of the labia majora [Labia Majora] : labia major [Normal] : clitoris [Labia Minora] : labia minora [Atrophy] : atrophy [Cystocele] : no cystocele [Erythema] : no erythema [Rectocele] : no rectocele [Dry Mucosa] : dry mucosa [Uterine Prolapse] : no uterine prolapse [No Bleeding] : there was no active vaginal bleeding [Discharge] : had no discharge [Erosion] : had no erosions [Motion Tenderness] : there was no cervical motion tenderness [Pap Obtained] : a Pap smear was performed [Soft] :  the cervix was soft [Normal Position] : in a normal position [Uterine Adnexae] : were not tender and not enlarged [Enlarged ___ wks] : not enlarged [Tenderness] : nontender [Adnexa Tenderness] : were not tender [Ovarian Mass (___ Cm)] : there were no adnexal masses

## 2019-04-10 NOTE — REVIEW OF SYSTEMS
[Fever] : no fever [Feeling Tired] : not feeling tired [Chills] : no chills [Recent Wt Gain ___ Lbs] : no recent weight gain [Redness] : no redness [Pain] : no pain [Discharge] : no discharge [Sight Problems] : no sight problems [Dec Hearing] : no hearing loss [Nosebleeds] : no nosebleeds [Nasal Discharge] : no nasal discharge [Sore Throat] : no sore throat [Heart Rate Is Fast] : the heart rate was not fast [Chest Pain] : no chest pain [Palpitations] : no palpitations [Lower Ext Edema] : no lower extremity edema [Wheezing] : no wheezing [Dyspnea] : no shortness of breath [Cough] : no cough [SOB on Exertion] : no shortness of breath during exertion [Vomiting] : no vomiting [Abdominal Pain] : no abdominal pain [Constipation] : no constipation [Diarrhea] : no diarrhea [Heartburn] : no heartburn [Melena] : no melena [Dysuria] : no dysuria [Abn Vag Bleeding] : no abnormal vaginal bleeding [Pelvic Pain] : no pelvic pain [Incontinence] : no incontinence [Frequency] : no frequency [Urgency] : no urgency [Urethral Discharge] : no abnormal urethral discharge [Joint Pain] : no joint pain [Arthralgias] : no arthralgias [Joint Stiffness] : no joint stiffness [Skin Lesions] : no skin lesions [Joint Swelling] : no joint swelling [Breast Pain] : no breast pain [Change In A Mole] : no change in a mole [Dizziness] : no dizziness [Breast Lump] : no breast lump [Convulsions] : no convulsions [Headache] : no headache [Fainting] : no fainting [Sleep Disturbances] : no sleep disturbances [Depression] : no depression [Anxiety] : no anxiety [Hot Flashes] : no hot flashes [Deepening Voice] : no deepening of the voice [Muscle Weakness] : no muscle weakness [Easy Bruising] : does not bruise easily [Easy Bleeding] : does not bleed easily [Swollen Glands] : no swollen glands [Feeling Weak] : no feelings of weakness [Swollen Glands In The Neck] : no swollen glands in the neck

## 2019-04-10 NOTE — HISTORY OF PRESENT ILLNESS
[Hot Flashes] : no hot flashes [Night Sweats] : no night sweats [Mood Changes] : no mood changes [Vaginal Itching] : no vaginal itching [Fatigue] : no fatigue [Headache] : no headache [Weight Change] : no weight change [Irritability] : no irritability [Forgetfulness] : no forgetfulness [Loss of Libido] : no loss of libido [Anxiety] : no anxiety [Depression] : no depression [Regular Cycle Intervals] : periods have been regular [Normal Amount/Duration] : was of a normal amount and duration [Menarche Age: ____] : age at menarche was [unfilled] [Menstrual Cramps] : menstrual cramps [Menopause Age: ____] : age at menopause was [unfilled] [Sexually Active] : is not sexually active

## 2019-05-22 ENCOUNTER — APPOINTMENT (OUTPATIENT)
Dept: SURGERY | Facility: CLINIC | Age: 79
End: 2019-05-22
Payer: MEDICARE

## 2019-05-22 VITALS — WEIGHT: 133 LBS | BODY MASS INDEX: 22.71 KG/M2 | HEIGHT: 64 IN

## 2019-05-22 DIAGNOSIS — T14.8XXA OTHER INJURY OF UNSPECIFIED BODY REGION, INITIAL ENCOUNTER: ICD-10-CM

## 2019-05-22 PROCEDURE — 99214 OFFICE O/P EST MOD 30 MIN: CPT

## 2019-05-23 PROBLEM — T14.8XXA HEMATOMA: Status: ACTIVE | Noted: 2019-05-23

## 2019-05-23 NOTE — PHYSICAL EXAM
[Normal Breath Sounds] : Normal breath sounds [Normal Heart Sounds] : normal heart sounds [Normal Rate and Rhythm] : normal rate and rhythm [Abdominal Masses] : No abdominal masses [Abdomen Tenderness] : ~T ~M No abdominal tenderness [No Rash or Lesion] : No rash or lesion [de-identified] : nl [de-identified] : nl [de-identified] : large low midline hematoma [de-identified] : nl

## 2019-05-23 NOTE — REVIEW OF SYSTEMS
[Heart Rate Is Slow] : the heart rate was not slow [Chest Pain] : no chest pain [Shortness Of Breath] : no shortness of breath [Abdominal Pain] : abdominal pain [Negative] : Eyes

## 2019-05-23 NOTE — ASSESSMENT
[FreeTextEntry1] : Long discussion regarding all options and risks\par I explained that she probably ruptured a blood vessel in the rectus sheath\par I assured her that this was a self limiting process and did not require any specific treatment\par She was advised to consult with Dr Dill regarding a hematology evaluation

## 2019-05-23 NOTE — HISTORY OF PRESENT ILLNESS
[de-identified] : This 79 year old woman noticed swelling and discomfort in the lower abdominal wall five days ago. She had been working in her garden for several days but denied injuries.\par Three days ago, she noticed a blue discoloration in the low anterior abdominal wall.

## 2019-05-27 ENCOUNTER — FORM ENCOUNTER (OUTPATIENT)
Age: 79
End: 2019-05-27

## 2019-05-28 ENCOUNTER — OUTPATIENT (OUTPATIENT)
Dept: OUTPATIENT SERVICES | Facility: HOSPITAL | Age: 79
LOS: 1 days | End: 2019-05-28
Payer: MEDICARE

## 2019-05-28 ENCOUNTER — APPOINTMENT (OUTPATIENT)
Dept: MAMMOGRAPHY | Facility: HOSPITAL | Age: 79
End: 2019-05-28
Payer: MEDICARE

## 2019-05-28 ENCOUNTER — APPOINTMENT (OUTPATIENT)
Dept: ULTRASOUND IMAGING | Facility: HOSPITAL | Age: 79
End: 2019-05-28
Payer: MEDICARE

## 2019-05-28 DIAGNOSIS — Z90.49 ACQUIRED ABSENCE OF OTHER SPECIFIED PARTS OF DIGESTIVE TRACT: Chronic | ICD-10-CM

## 2019-05-28 DIAGNOSIS — Z90.89 ACQUIRED ABSENCE OF OTHER ORGANS: Chronic | ICD-10-CM

## 2019-05-28 DIAGNOSIS — Z00.8 ENCOUNTER FOR OTHER GENERAL EXAMINATION: ICD-10-CM

## 2019-05-28 PROCEDURE — 77067 SCR MAMMO BI INCL CAD: CPT | Mod: 26

## 2019-05-28 PROCEDURE — 77067 SCR MAMMO BI INCL CAD: CPT

## 2019-05-28 PROCEDURE — 77063 BREAST TOMOSYNTHESIS BI: CPT | Mod: 26

## 2019-05-28 PROCEDURE — 77063 BREAST TOMOSYNTHESIS BI: CPT

## 2020-01-06 ENCOUNTER — APPOINTMENT (OUTPATIENT)
Dept: INTERNAL MEDICINE | Facility: CLINIC | Age: 80
End: 2020-01-06
Payer: MEDICARE

## 2020-01-06 VITALS
RESPIRATION RATE: 15 BRPM | WEIGHT: 130 LBS | HEART RATE: 83 BPM | DIASTOLIC BLOOD PRESSURE: 78 MMHG | HEIGHT: 64 IN | TEMPERATURE: 97.9 F | BODY MASS INDEX: 22.2 KG/M2 | OXYGEN SATURATION: 98 % | SYSTOLIC BLOOD PRESSURE: 122 MMHG

## 2020-01-06 PROCEDURE — G0444 DEPRESSION SCREEN ANNUAL: CPT | Mod: 59

## 2020-01-06 PROCEDURE — 99215 OFFICE O/P EST HI 40 MIN: CPT | Mod: 25

## 2020-01-06 NOTE — HEALTH RISK ASSESSMENT
[] : No [Yes] : Yes [No falls in past year] : Patient reported no falls in the past year [1] : 2) Feeling down, depressed, or hopeless for several days (1) [[id number]] : 2

## 2020-01-06 NOTE — HISTORY OF PRESENT ILLNESS
[de-identified] : 79-year-old woman comes to the office for followup with history of hypertension colon cancer bilateral hearing loss and intermittent abdominal bloating with the help of her  she has become depressed and has lost energy to do things she denies suicidal ideation he has had no anxiety she denies headaches temperature chills sweats or myalgias she's had no sinus congestion sore throat cough wheezing pleurisy chest pain shortness of breath exertional dyspnea lightheadedness palpitations dizziness vertigo or syncope she has had no trauma pains nausea vomiting diarrhea constipation bright red blood per rectum or black stools her appetite has been good her weight is been stable she denies significant musculoskeletal complaints she does urinate frequently and gets up occasionally at night to urinate she has had no leg edema or recent skin rashes [FreeTextEntry1] : Comes to the office for followup to review her medications and discuss her overall health recent issues with depression

## 2020-01-06 NOTE — ASSESSMENT
[FreeTextEntry1] : Physical exam shows an elderly woman in no acute distress her blood pressure was 122/78 well controlled on her medication height 5 feet 4 inches weight 130 pounds heart rate is 68 respirations are 15 HEENT was unremarkable chest was clear cardiovascular exam normal S1 and S2 with no extra heart sounds or murmurs abdomen was soft and nontender extremities showed no clubbing cyanosis or edema the neurologic exam was nonfocal patient started on citalopram 10 mg a day she was made aware that it could take 3 or 4 weeks to see maximum benefit side effects of the medication were reviewed blood pressure well controlled at present visit she has an appointment in March for a complete physical at which time blood test will be performed she is up-to-date with her gynecologist mammograms a slip was given for a bone density as her last one was in 2014

## 2020-01-06 NOTE — REVIEW OF SYSTEMS
[Hearing Loss] : hearing loss [Diarrhea] : diarrhea [Joint Stiffness] : joint stiffness [Negative] : Psychiatric [Fever] : no fever [Chills] : no chills [Fatigue] : no fatigue [Hot Flashes] : no hot flashes [Night Sweats] : no night sweats [Recent Change In Weight] : ~T no recent weight change [Discharge] : no discharge [Pain] : no pain [Redness] : no redness [Dryness] : no dryness  [Itching] : no itching [Vision Problems] : no vision problems [Nosebleed] : no nosebleeds [Earache] : no earache [Nasal Discharge] : no nasal discharge [Hoarseness] : no hoarseness [Sore Throat] : no sore throat [Chest Pain] : no chest pain [Palpitations] : no palpitations [Leg Claudication] : no leg claudication [Lower Ext Edema] : no lower extremity edema [Paroxysmal Nocturnal Dyspnea] : no paroxysmal nocturnal dyspnea [Orthopnea] : no orthopnea [Shortness Of Breath] : no shortness of breath [Wheezing] : no wheezing [Dyspnea on Exertion] : no dyspnea on exertion [Cough] : no cough [Abdominal Pain] : no abdominal pain [Nausea] : no nausea [Constipation] : no constipation [Heartburn] : no heartburn [Vomiting] : no vomiting [Melena] : no melena [Dysuria] : no dysuria [Incontinence] : no incontinence [Poor Libido] : libido not poor [Hematuria] : no hematuria [Frequency] : no frequency [Vaginal Discharge] : no vaginal discharge [Joint Pain] : no joint pain [Joint Swelling] : no joint swelling [Muscle Weakness] : no muscle weakness [Muscle Pain] : no muscle pain [Back Pain] : no back pain [Itching] : no itching [Hair Changes] : no hair changes [Nail Changes] : no nail changes [Mole Changes] : no mole changes [Headache] : no headache [Skin Rash] : no skin rash [Dizziness] : no dizziness [Confusion] : no confusion [Fainting] : no fainting [Unsteady Walking] : no ataxia [Memory Loss] : no memory loss [Suicidal] : not suicidal [Insomnia] : no insomnia [Anxiety] : no anxiety [Easy Bleeding] : no easy bleeding [Depression] : no depression [Easy Bruising] : no easy bruising [Swollen Glands] : no swollen glands

## 2020-01-06 NOTE — PHYSICAL EXAM
[No Acute Distress] : no acute distress [Well Nourished] : well nourished [Well Developed] : well developed [Normal Voice/Communication] : normal voice/communication [Well-Appearing] : well-appearing [Normal Sclera/Conjunctiva] : normal sclera/conjunctiva [PERRL] : pupils equal round and reactive to light [EOMI] : extraocular movements intact [Normal Oropharynx] : the oropharynx was normal [Normal Outer Ear/Nose] : the outer ears and nose were normal in appearance [Normal TMs] : both tympanic membranes were normal [No JVD] : no jugular venous distention [Normal Nasal Mucosa] : the nasal mucosa was normal [No Lymphadenopathy] : no lymphadenopathy [Supple] : supple [No Respiratory Distress] : no respiratory distress  [Thyroid Normal, No Nodules] : the thyroid was normal and there were no nodules present [No Accessory Muscle Use] : no accessory muscle use [Clear to Auscultation] : lungs were clear to auscultation bilaterally [Regular Rhythm] : with a regular rhythm [Normal Rate] : normal rate  [Normal Percussion] : the chest was normal to percussion [Normal S1, S2] : normal S1 and S2 [No Carotid Bruits] : no carotid bruits [No Murmur] : no murmur heard [No Abdominal Bruit] : a ~M bruit was not heard ~T in the abdomen [No Varicosities] : no varicosities [Pedal Pulses Present] : the pedal pulses are present [No Edema] : there was no peripheral edema [No Palpable Aorta] : no palpable aorta [No Extremity Clubbing/Cyanosis] : no extremity clubbing/cyanosis [Declined Breast Exam] : declined breast exam  [Soft] : abdomen soft [Non Tender] : non-tender [Non-distended] : non-distended [No Masses] : no abdominal mass palpated [No HSM] : no HSM [Normal Bowel Sounds] : normal bowel sounds [No Hernias] : no hernias [Declined Rectal Exam] : declined rectal exam [Normal Supraclavicular Nodes] : no supraclavicular lymphadenopathy [Normal Axillary Nodes] : no axillary lymphadenopathy [Normal Posterior Cervical Nodes] : no posterior cervical lymphadenopathy [Normal Inguinal Nodes] : no inguinal lymphadenopathy [Normal Anterior Cervical Nodes] : no anterior cervical lymphadenopathy [Normal Femoral Nodes] : no femoral lymphadenopathy [No Spinal Tenderness] : no spinal tenderness [No CVA Tenderness] : no CVA  tenderness [Kyphosis] : no kyphosis [Scoliosis] : no scoliosis [No Joint Swelling] : no joint swelling [Grossly Normal Strength/Tone] : grossly normal strength/tone [No Skin Lesions] : no skin lesions [No Rash] : no rash [Coordination Grossly Intact] : coordination grossly intact [Acne] : no acne [No Focal Deficits] : no focal deficits [Normal Gait] : normal gait [Speech Grossly Normal] : speech grossly normal [Deep Tendon Reflexes (DTR)] : deep tendon reflexes were 2+ and symmetric [Normal Affect] : the affect was normal [Memory Grossly Normal] : memory grossly normal [Alert and Oriented x3] : oriented to person, place, and time [Normal Mood] : the mood was normal [Normal Insight/Judgement] : insight and judgment were intact

## 2020-03-03 ENCOUNTER — RX RENEWAL (OUTPATIENT)
Age: 80
End: 2020-03-03

## 2020-03-09 LAB
25(OH)D3 SERPL-MCNC: 64.7 NG/ML
ALBUMIN SERPL ELPH-MCNC: 4.4 G/DL
ALP BLD-CCNC: 52 U/L
ALT SERPL-CCNC: 25 U/L
ANION GAP SERPL CALC-SCNC: 11 MMOL/L
AST SERPL-CCNC: 37 U/L
BASOPHILS # BLD AUTO: 0.08 K/UL
BASOPHILS NFR BLD AUTO: 1 %
BILIRUB SERPL-MCNC: 0.4 MG/DL
BUN SERPL-MCNC: 17 MG/DL
CALCIUM SERPL-MCNC: 9.8 MG/DL
CHLORIDE SERPL-SCNC: 105 MMOL/L
CHOLEST SERPL-MCNC: 237 MG/DL
CHOLEST/HDLC SERPL: 2 RATIO
CO2 SERPL-SCNC: 22 MMOL/L
CREAT SERPL-MCNC: 0.97 MG/DL
CRP SERPL HS-MCNC: 0.59 MG/L
EOSINOPHIL # BLD AUTO: 0.18 K/UL
EOSINOPHIL NFR BLD AUTO: 2.2 %
GLUCOSE BS SERPL-MCNC: 108 MG/DL
GLUCOSE SERPL-MCNC: 109 MG/DL
HCT VFR BLD CALC: 45.1 %
HDLC SERPL-MCNC: 120 MG/DL
HGB BLD-MCNC: 14.6 G/DL
IMM GRANULOCYTES NFR BLD AUTO: 0.4 %
LDLC SERPL CALC-MCNC: 93 MG/DL
LYMPHOCYTES # BLD AUTO: 1.56 K/UL
LYMPHOCYTES NFR BLD AUTO: 18.7 %
MAN DIFF?: NORMAL
MCHC RBC-ENTMCNC: 32.4 GM/DL
MCHC RBC-ENTMCNC: 32.7 PG
MCV RBC AUTO: 100.9 FL
MONOCYTES # BLD AUTO: 0.78 K/UL
MONOCYTES NFR BLD AUTO: 9.3 %
NEUTROPHILS # BLD AUTO: 5.73 K/UL
NEUTROPHILS NFR BLD AUTO: 68.4 %
PLATELET # BLD AUTO: 236 K/UL
POTASSIUM SERPL-SCNC: 4.3 MMOL/L
PROT SERPL-MCNC: 7.2 G/DL
RBC # BLD: 4.47 M/UL
RBC # FLD: 14.1 %
SODIUM SERPL-SCNC: 139 MMOL/L
T4 FREE SERPL-MCNC: 1.1 NG/DL
TRIGL SERPL-MCNC: 123 MG/DL
TSH SERPL-ACNC: 4.55 UIU/ML
VIT B12 SERPL-MCNC: 1292 PG/ML
WBC # FLD AUTO: 8.36 K/UL

## 2020-03-10 LAB
MEV IGG FLD QL IA: >300 AU/ML
MEV IGG+IGM SER-IMP: POSITIVE
MUV AB SER-ACNC: POSITIVE
MUV IGG SER QL IA: 87.8 AU/ML
RUBV IGG FLD-ACNC: 21.1 INDEX
RUBV IGG SER-IMP: POSITIVE

## 2020-03-23 ENCOUNTER — APPOINTMENT (OUTPATIENT)
Dept: INTERNAL MEDICINE | Facility: CLINIC | Age: 80
End: 2020-03-23
Payer: MEDICARE

## 2020-03-23 VITALS
SYSTOLIC BLOOD PRESSURE: 130 MMHG | WEIGHT: 129 LBS | HEART RATE: 72 BPM | HEIGHT: 64 IN | BODY MASS INDEX: 22.02 KG/M2 | RESPIRATION RATE: 15 BRPM | DIASTOLIC BLOOD PRESSURE: 72 MMHG

## 2020-03-23 VITALS
DIASTOLIC BLOOD PRESSURE: 68 MMHG | TEMPERATURE: 99.1 F | WEIGHT: 129 LBS | HEART RATE: 72 BPM | OXYGEN SATURATION: 96 % | HEIGHT: 64 IN | RESPIRATION RATE: 15 BRPM | BODY MASS INDEX: 22.02 KG/M2 | SYSTOLIC BLOOD PRESSURE: 130 MMHG

## 2020-03-23 PROCEDURE — 99215 OFFICE O/P EST HI 40 MIN: CPT

## 2020-03-23 RX ORDER — ESCITALOPRAM OXALATE 10 MG/1
10 TABLET ORAL DAILY
Qty: 30 | Refills: 5 | Status: DISCONTINUED | COMMUNITY
Start: 2020-01-06 | End: 2020-03-23

## 2020-03-24 VITALS — TEMPERATURE: 98.3 F

## 2020-03-24 NOTE — REVIEW OF SYSTEMS
[Hearing Loss] : hearing loss [Diarrhea] : diarrhea [Joint Stiffness] : joint stiffness [Negative] : Heme/Lymph [Fever] : no fever [Chills] : no chills [Fatigue] : no fatigue [Hot Flashes] : no hot flashes [Night Sweats] : no night sweats [Recent Change In Weight] : ~T no recent weight change [Discharge] : no discharge [Pain] : no pain [Redness] : no redness [Dryness] : no dryness  [Vision Problems] : no vision problems [Itching] : no itching [Earache] : no earache [Nosebleed] : no nosebleeds [Hoarseness] : no hoarseness [Nasal Discharge] : no nasal discharge [Sore Throat] : no sore throat [Chest Pain] : no chest pain [Palpitations] : no palpitations [Leg Claudication] : no leg claudication [Lower Ext Edema] : no lower extremity edema [Orthopnea] : no orthopnea [Paroxysmal Nocturnal Dyspnea] : no paroxysmal nocturnal dyspnea [Shortness Of Breath] : no shortness of breath [Wheezing] : no wheezing [Cough] : no cough [Dyspnea on Exertion] : no dyspnea on exertion [Abdominal Pain] : no abdominal pain [Nausea] : no nausea [Constipation] : no constipation [Vomiting] : no vomiting [Heartburn] : no heartburn [Melena] : no melena [Dysuria] : no dysuria [Incontinence] : no incontinence [Poor Libido] : libido not poor [Hematuria] : no hematuria [Frequency] : no frequency [Vaginal Discharge] : no vaginal discharge [Joint Pain] : no joint pain [Joint Swelling] : no joint swelling [Muscle Weakness] : no muscle weakness [Muscle Pain] : no muscle pain [Back Pain] : no back pain [Itching] : no itching [Mole Changes] : no mole changes [Nail Changes] : no nail changes [Hair Changes] : no hair changes [Skin Rash] : no skin rash [Headache] : no headache [Dizziness] : no dizziness [Fainting] : no fainting [Confusion] : no confusion [Memory Loss] : no memory loss [Unsteady Walking] : no ataxia [Suicidal] : not suicidal [Insomnia] : no insomnia [Anxiety] : no anxiety [Depression] : no depression [Easy Bleeding] : no easy bleeding [Easy Bruising] : no easy bruising [Swollen Glands] : no swollen glands

## 2020-03-24 NOTE — ASSESSMENT
[FreeTextEntry1] : Physical exam shows a well-developed female in no acute distress blood pressure 130/72 height 5 feet 4 inches weight 129 pounds temperature 98.3°F pulse is 68 respiratory rate of 15 HEENT was unremarkable chest was clear cardiovascular exam was regular abdomen was soft extremities showed no edema neurologic exam was nonfocal her recent blood tests were reviewed total cholesterol 237  LDL 93 total cholesterol HDL ratio 2.0. Rest of blood tests within normal limits patient up-to-date with her gynecologist Dr. Harris she had a complete mammogram in May of 2019 she has a slip to schedule and get a bone density she is up to date with her ophthalmologist and dermatologist she wishes that her rate she to defer on colorectal cancer screening she was offered a stool DNA test which she also defers on she is up to date on her influenza vaccine however defers on pneumonia shot and shingles vaccine despite informing her of the importance of these she is active and tries to walk as much as possible

## 2020-03-24 NOTE — HEALTH RISK ASSESSMENT
[Yes] : Yes [No falls in past year] : Patient reported no falls in the past year [0] : 2) Feeling down, depressed, or hopeless: Not at all (0) [] : No [ABA1Hcpdy] : 0

## 2020-03-24 NOTE — HISTORY OF PRESENT ILLNESS
[FreeTextEntry1] : Comes to the office for followup to review her medications and discuss her overall health she wishes also to discuss recently performed blood tests [de-identified] : 79-year-old woman comes to the office for followup  with history of hypertension depression colon cancer bilateral hearing loss patient denies temperature chills sweats or myalgias she has had no headache sinus congestion sore throat cough wheezing pleurisy chest pain shortness of breath exertional dyspnea lightheadedness palpitations dizziness vertigo or syncope she has had no recent abdominal pain nausea vomiting diarrhea or constipation bright red blood per rectum or black stools her appetite has been good her weight has been stable she denies significant musculoskeletal complaints he urinates frequently and does get up occasionally at night to urinate but denies dysuria or gross hematuria she has had no recent leg edema and she denies any recent skin rashes

## 2020-06-05 ENCOUNTER — APPOINTMENT (OUTPATIENT)
Dept: SURGERY | Facility: CLINIC | Age: 80
End: 2020-06-05
Payer: MEDICARE

## 2020-06-05 DIAGNOSIS — Z85.038 PERSONAL HISTORY OF OTHER MALIGNANT NEOPLASM OF LARGE INTESTINE: ICD-10-CM

## 2020-06-05 PROCEDURE — 99214 OFFICE O/P EST MOD 30 MIN: CPT

## 2020-06-06 NOTE — PHYSICAL EXAM
[Normal Breath Sounds] : Normal breath sounds [Normal Heart Sounds] : normal heart sounds [Normal Rate and Rhythm] : normal rate and rhythm [Abdominal Masses] : No abdominal masses [Abdomen Tenderness] : ~T ~M No abdominal tenderness [No Rash or Lesion] : No rash or lesion [de-identified] : nl [de-identified] : nl [de-identified] : reducible incisional hernia [de-identified] : nl

## 2020-06-06 NOTE — HISTORY OF PRESENT ILLNESS
[de-identified] : The patient has been feeling well and recently underwent a physical examination by Dr. Cannon: a ventral hernia was palpated. The patient is asymptomatic regarding her GI tract, as she is eating well and passing stool easily. She denies any abdominal pain nor abdominal wall masses.

## 2020-06-06 NOTE — ASSESSMENT
[FreeTextEntry1] : Long discussion regarding all options and risks\par As the patient is asymptomatic regarding the ventral hernia, I did not recommend any surgery at this time. The patient will return in three months for a repeat examinattion.

## 2020-06-17 ENCOUNTER — APPOINTMENT (OUTPATIENT)
Dept: OBGYN | Facility: CLINIC | Age: 80
End: 2020-06-17
Payer: MEDICARE

## 2020-06-17 VITALS
TEMPERATURE: 97.1 F | WEIGHT: 130 LBS | SYSTOLIC BLOOD PRESSURE: 130 MMHG | BODY MASS INDEX: 22.2 KG/M2 | DIASTOLIC BLOOD PRESSURE: 80 MMHG | HEART RATE: 61 BPM | HEIGHT: 64 IN

## 2020-06-17 PROCEDURE — G0101: CPT

## 2020-06-17 NOTE — HISTORY OF PRESENT ILLNESS
[Normal Amount/Duration] : was of a normal amount and duration [Menarche Age: ____] : age at menarche was [unfilled] [Regular Cycle Intervals] : periods have been regular [Menopause Age: ____] : age at menopause was [unfilled] [Menstrual Cramps] : menstrual cramps [Hot Flashes] : no hot flashes [Night Sweats] : no night sweats [Vaginal Itching] : no vaginal itching [Mood Changes] : no mood changes [Headache] : no headache [Fatigue] : no fatigue [Weight Change] : no weight change [Irritability] : no irritability [Forgetfulness] : no forgetfulness [Loss of Libido] : no loss of libido [Depression] : no depression [Anxiety] : no anxiety [Sexually Active] : is not sexually active

## 2020-06-17 NOTE — PHYSICAL EXAM
[Awake] : awake [Alert] : alert [Acute Distress] : no acute distress [LAD] : no lymphadenopathy [Thyroid Nodule] : no thyroid nodule [Goiter] : no goiter [Mass] : no breast mass [Nipple Discharge] : no nipple discharge [Axillary LAD] : no axillary lymphadenopathy [Tender] : non tender [Distended] : not distended [H/Smegaly] : no hepatosplenomegaly [Oriented x3] : oriented to person, place, and time [Depressed Mood] : not depressed [Flat Affect] : affect not flat [No Lesions] : no genitalia lesions [Vulvar Atrophy] : no vulvar atrophy [Vulvitis] : no vulvitis [Labia Majora Erythema] : no erythema of the labia majora [Labia Minora Erythema] : no erythema of the labia minora [Labia Majora] : labia major [Normal] : clitoris [Labia Minora] : labia minora [Erythema] : no erythema [Atrophy] : atrophy [Cystocele] : no cystocele [Rectocele] : no rectocele [Dry Mucosa] : dry mucosa [Uterine Prolapse] : no uterine prolapse [No Bleeding] : there was no active vaginal bleeding [Erosion] : had no erosions [Discharge] : had no discharge [Pap Obtained] : a Pap smear was performed [Motion Tenderness] : there was no cervical motion tenderness [Soft] :  the cervix was soft [Normal Position] : in a normal position [Enlarged ___ wks] : not enlarged [Tenderness] : nontender [Uterine Adnexae] : were not tender and not enlarged [Mass ___ cm] : no uterine mass was palpated [Ovarian Mass (___ Cm)] : there were no adnexal masses [Adnexa Tenderness] : were not tender

## 2020-06-19 LAB
CYTOLOGY CVX/VAG DOC THIN PREP: ABNORMAL
HPV HIGH+LOW RISK DNA PNL CVX: NOT DETECTED

## 2020-06-25 ENCOUNTER — RESULT REVIEW (OUTPATIENT)
Age: 80
End: 2020-06-25

## 2020-06-25 ENCOUNTER — APPOINTMENT (OUTPATIENT)
Dept: RADIOLOGY | Facility: HOSPITAL | Age: 80
End: 2020-06-25
Payer: MEDICARE

## 2020-06-25 ENCOUNTER — APPOINTMENT (OUTPATIENT)
Dept: MAMMOGRAPHY | Facility: HOSPITAL | Age: 80
End: 2020-06-25
Payer: MEDICARE

## 2020-06-25 ENCOUNTER — OUTPATIENT (OUTPATIENT)
Dept: OUTPATIENT SERVICES | Facility: HOSPITAL | Age: 80
LOS: 1 days | End: 2020-06-25
Payer: MEDICARE

## 2020-06-25 DIAGNOSIS — Z01.419 ENCOUNTER FOR GYNECOLOGICAL EXAMINATION (GENERAL) (ROUTINE) WITHOUT ABNORMAL FINDINGS: ICD-10-CM

## 2020-06-25 DIAGNOSIS — Z90.49 ACQUIRED ABSENCE OF OTHER SPECIFIED PARTS OF DIGESTIVE TRACT: Chronic | ICD-10-CM

## 2020-06-25 DIAGNOSIS — Z90.89 ACQUIRED ABSENCE OF OTHER ORGANS: Chronic | ICD-10-CM

## 2020-06-25 PROCEDURE — 77080 DXA BONE DENSITY AXIAL: CPT | Mod: 26

## 2020-06-25 PROCEDURE — 77063 BREAST TOMOSYNTHESIS BI: CPT | Mod: 26

## 2020-06-25 PROCEDURE — 77063 BREAST TOMOSYNTHESIS BI: CPT

## 2020-06-25 PROCEDURE — 77067 SCR MAMMO BI INCL CAD: CPT | Mod: 26

## 2020-06-25 PROCEDURE — 77080 DXA BONE DENSITY AXIAL: CPT

## 2020-06-25 PROCEDURE — 77067 SCR MAMMO BI INCL CAD: CPT

## 2020-08-03 ENCOUNTER — APPOINTMENT (OUTPATIENT)
Dept: INTERNAL MEDICINE | Facility: CLINIC | Age: 80
End: 2020-08-03
Payer: MEDICARE

## 2020-08-03 VITALS
HEIGHT: 64 IN | BODY MASS INDEX: 21.34 KG/M2 | RESPIRATION RATE: 16 BRPM | DIASTOLIC BLOOD PRESSURE: 80 MMHG | TEMPERATURE: 97.5 F | WEIGHT: 125 LBS | HEART RATE: 65 BPM | OXYGEN SATURATION: 97 % | SYSTOLIC BLOOD PRESSURE: 128 MMHG

## 2020-08-03 PROCEDURE — 99215 OFFICE O/P EST HI 40 MIN: CPT

## 2020-08-03 NOTE — ASSESSMENT
[FreeTextEntry1] : Physical examination shows an elderly woman in no acute distress pressure was 128/80 height 5 feet 4 inches weight 125 pounds BMI 21.46 temperature of 97.5 °F heart rate of 65 respiratory rate of 15 HEENT was unremarkable chest was clear cardiovascular was regular abdomen was soft and nontender extremities showed no edema neurologic exam was nonfocal uncertain etiology of her diarrhea she has recently increased her sertraline from 50 mg to 100 this could be associated and she really reduce it back down to 50 mg a stool PCR for GI pathology including C. difficile will be obtained she will continue with cholestyramine and follow a brat diet she will call me daily to check in until the C. difficile toxin returns negative Imodium was not recommended she had complete blood test in March recently had a mammography June 25, 2020 and a bone density in June 2020 is up-to-date with her ophthalmologist and dermatologist for new shingles vaccine the Prevnar 13 vaccine and the Pneumovax 23 patient's diet was reviewed and suggestions were made she is active and tries to walk as much as possible.  Blood pressure well controlled at the present visit

## 2020-08-03 NOTE — REVIEW OF SYSTEMS
[Hearing Loss] : hearing loss [Diarrhea] : diarrhea [Joint Stiffness] : joint stiffness [Negative] : Heme/Lymph [Fever] : no fever [Chills] : no chills [Fatigue] : no fatigue [Night Sweats] : no night sweats [Hot Flashes] : no hot flashes [Recent Change In Weight] : ~T no recent weight change [Discharge] : no discharge [Pain] : no pain [Redness] : no redness [Dryness] : no dryness  [Vision Problems] : no vision problems [Itching] : no itching [Earache] : no earache [Nosebleed] : no nosebleeds [Hoarseness] : no hoarseness [Sore Throat] : no sore throat [Nasal Discharge] : no nasal discharge [Chest Pain] : no chest pain [Palpitations] : no palpitations [Leg Claudication] : no leg claudication [Lower Ext Edema] : no lower extremity edema [Orthopnea] : no orthopnea [Paroxysmal Nocturnal Dyspnea] : no paroxysmal nocturnal dyspnea [Shortness Of Breath] : no shortness of breath [Wheezing] : no wheezing [Cough] : no cough [Dyspnea on Exertion] : no dyspnea on exertion [Nausea] : no nausea [Abdominal Pain] : no abdominal pain [Vomiting] : no vomiting [Constipation] : no constipation [Melena] : no melena [Heartburn] : no heartburn [Dysuria] : no dysuria [Incontinence] : no incontinence [Poor Libido] : libido not poor [Hematuria] : no hematuria [Vaginal Discharge] : no vaginal discharge [Frequency] : no frequency [Joint Swelling] : no joint swelling [Joint Pain] : no joint pain [Muscle Pain] : no muscle pain [Muscle Weakness] : no muscle weakness [Back Pain] : no back pain [Itching] : no itching [Mole Changes] : no mole changes [Nail Changes] : no nail changes [Hair Changes] : no hair changes [Skin Rash] : no skin rash [Headache] : no headache [Dizziness] : no dizziness [Fainting] : no fainting [Memory Loss] : no memory loss [Confusion] : no confusion [Suicidal] : not suicidal [Unsteady Walking] : no ataxia [Anxiety] : no anxiety [Insomnia] : no insomnia [Depression] : no depression [Easy Bleeding] : no easy bleeding [Easy Bruising] : no easy bruising [Swollen Glands] : no swollen glands [FreeTextEntry7] : not at night

## 2020-08-03 NOTE — HISTORY OF PRESENT ILLNESS
[FreeTextEntry1] : Comes to the office for follow-up to review her medications and discuss her overall health recent issues with loose bowel movements [de-identified] : 80-year-old woman with a history of colon cancer volvulus of the ascending colon hypertension depression bilateral hearing loss comes to the office for follow-up her main complaint is that over the past 3 or 4 weeks she has been experiencing loose bowel movements 3-5 times a day she does not get the bowel movements at night is not associated with abdominal pain blood in the stool or temperature chills or sweats or myalgias it might be prompted slightly by eating she has had no nausea or vomiting she denies headache sinus congestion sore throat cough wheezing pleurisy chest pain shortness of breath exertional dyspnea lightheadedness palpitations dizziness vertigo or syncope she has had no bright red blood per rectum or black stools her appetite has been good her weight is been stable she denies significant musculoskeletal complaints she has had no leg edema skin rashes dysuria hematuria or polyuria she has been sleeping well note patient has not been on antibiotics for at least over a year

## 2020-08-03 NOTE — HEALTH RISK ASSESSMENT
[] : No [No falls in past year] : Patient reported no falls in the past year [Yes] : Yes [0] : 2) Feeling down, depressed, or hopeless: Not at all (0) [[id number]] : 0

## 2020-08-03 NOTE — PHYSICAL EXAM
[Well Nourished] : well nourished [No Acute Distress] : no acute distress [Well Developed] : well developed [Well-Appearing] : well-appearing [Normal Voice/Communication] : normal voice/communication [Normal Sclera/Conjunctiva] : normal sclera/conjunctiva [EOMI] : extraocular movements intact [PERRL] : pupils equal round and reactive to light [Normal Outer Ear/Nose] : the outer ears and nose were normal in appearance [Normal Oropharynx] : the oropharynx was normal [Normal TMs] : both tympanic membranes were normal [Normal Nasal Mucosa] : the nasal mucosa was normal [No Lymphadenopathy] : no lymphadenopathy [No JVD] : no jugular venous distention [Thyroid Normal, No Nodules] : the thyroid was normal and there were no nodules present [Supple] : supple [No Respiratory Distress] : no respiratory distress  [No Accessory Muscle Use] : no accessory muscle use [Clear to Auscultation] : lungs were clear to auscultation bilaterally [Normal Percussion] : the chest was normal to percussion [Normal Rate] : normal rate  [Regular Rhythm] : with a regular rhythm [No Carotid Bruits] : no carotid bruits [No Murmur] : no murmur heard [Normal S1, S2] : normal S1 and S2 [No Varicosities] : no varicosities [No Abdominal Bruit] : a ~M bruit was not heard ~T in the abdomen [Pedal Pulses Present] : the pedal pulses are present [No Edema] : there was no peripheral edema [No Extremity Clubbing/Cyanosis] : no extremity clubbing/cyanosis [No Palpable Aorta] : no palpable aorta [Declined Breast Exam] : declined breast exam  [Soft] : abdomen soft [No Masses] : no abdominal mass palpated [Non Tender] : non-tender [Non-distended] : non-distended [No HSM] : no HSM [No Hernias] : no hernias [Normal Bowel Sounds] : normal bowel sounds [Normal Supraclavicular Nodes] : no supraclavicular lymphadenopathy [Declined Rectal Exam] : declined rectal exam [Normal Axillary Nodes] : no axillary lymphadenopathy [Normal Posterior Cervical Nodes] : no posterior cervical lymphadenopathy [Normal Anterior Cervical Nodes] : no anterior cervical lymphadenopathy [Normal Inguinal Nodes] : no inguinal lymphadenopathy [No CVA Tenderness] : no CVA  tenderness [Normal Femoral Nodes] : no femoral lymphadenopathy [Kyphosis] : no kyphosis [No Spinal Tenderness] : no spinal tenderness [Scoliosis] : no scoliosis [No Joint Swelling] : no joint swelling [Grossly Normal Strength/Tone] : grossly normal strength/tone [No Rash] : no rash [No Skin Lesions] : no skin lesions [Coordination Grossly Intact] : coordination grossly intact [Acne] : no acne [Normal Gait] : normal gait [No Focal Deficits] : no focal deficits [Deep Tendon Reflexes (DTR)] : deep tendon reflexes were 2+ and symmetric [Speech Grossly Normal] : speech grossly normal [Memory Grossly Normal] : memory grossly normal [Alert and Oriented x3] : oriented to person, place, and time [Normal Affect] : the affect was normal [Normal Mood] : the mood was normal [Normal Insight/Judgement] : insight and judgment were intact

## 2020-08-18 LAB
C DIFF TOX GENS STL QL NAA+PROBE: NORMAL
CDIFF BY PCR: NOT DETECTED
GI PCR PANEL, STOOL: NORMAL

## 2020-08-31 ENCOUNTER — RX RENEWAL (OUTPATIENT)
Age: 80
End: 2020-08-31

## 2020-10-12 ENCOUNTER — RX RENEWAL (OUTPATIENT)
Age: 80
End: 2020-10-12

## 2021-01-09 ENCOUNTER — RX RENEWAL (OUTPATIENT)
Age: 81
End: 2021-01-09

## 2021-02-24 ENCOUNTER — RX RENEWAL (OUTPATIENT)
Age: 81
End: 2021-02-24

## 2021-04-09 ENCOUNTER — RX RENEWAL (OUTPATIENT)
Age: 81
End: 2021-04-09

## 2021-05-24 ENCOUNTER — RX RENEWAL (OUTPATIENT)
Age: 81
End: 2021-05-24

## 2021-06-23 ENCOUNTER — APPOINTMENT (OUTPATIENT)
Dept: OBGYN | Facility: CLINIC | Age: 81
End: 2021-06-23
Payer: MEDICARE

## 2021-06-23 VITALS
HEIGHT: 64 IN | HEART RATE: 70 BPM | OXYGEN SATURATION: 98 % | TEMPERATURE: 97.8 F | WEIGHT: 130 LBS | DIASTOLIC BLOOD PRESSURE: 70 MMHG | BODY MASS INDEX: 22.2 KG/M2 | SYSTOLIC BLOOD PRESSURE: 100 MMHG

## 2021-06-23 PROCEDURE — 99213 OFFICE O/P EST LOW 20 MIN: CPT

## 2021-06-23 NOTE — HISTORY OF PRESENT ILLNESS
[LMPDate] : age 38 [[facility] : 3 [[facility] Center[facility]] : 3 [FreeTextEntry1] : S/P  X 3

## 2021-06-23 NOTE — PHYSICAL EXAM
[Appropriately responsive] : appropriately responsive [Alert] : alert [No Acute Distress] : no acute distress [No Lymphadenopathy] : no lymphadenopathy [Non-tender] : non-tender [No Lesions] : no lesions [No Mass] : no mass [Examination Of The Breasts] : a normal appearance [No Discharge] : no discharge [No Masses] : no breast masses were palpable [Labia Majora] : normal [Labia Minora] : normal [Atrophy] : atrophy [Soft] : soft [Normal] : normal [Normal Position] : in a normal position [Uterine Adnexae] : normal [Tenderness] : nontender [Enlarged ___ wks] : not enlarged [Mass ___ cm] : no uterine mass was palpated [FreeTextEntry9] : last colonoscopy 2020

## 2021-06-28 ENCOUNTER — OUTPATIENT (OUTPATIENT)
Dept: OUTPATIENT SERVICES | Facility: HOSPITAL | Age: 81
LOS: 1 days | End: 2021-06-28
Payer: MEDICARE

## 2021-06-28 ENCOUNTER — RESULT REVIEW (OUTPATIENT)
Age: 81
End: 2021-06-28

## 2021-06-28 ENCOUNTER — APPOINTMENT (OUTPATIENT)
Dept: MAMMOGRAPHY | Facility: HOSPITAL | Age: 81
End: 2021-06-28
Payer: MEDICARE

## 2021-06-28 DIAGNOSIS — Z90.89 ACQUIRED ABSENCE OF OTHER ORGANS: Chronic | ICD-10-CM

## 2021-06-28 DIAGNOSIS — Z90.49 ACQUIRED ABSENCE OF OTHER SPECIFIED PARTS OF DIGESTIVE TRACT: Chronic | ICD-10-CM

## 2021-06-28 DIAGNOSIS — Z01.419 ENCOUNTER FOR GYNECOLOGICAL EXAMINATION (GENERAL) (ROUTINE) WITHOUT ABNORMAL FINDINGS: ICD-10-CM

## 2021-06-28 PROCEDURE — 77067 SCR MAMMO BI INCL CAD: CPT

## 2021-06-28 PROCEDURE — 77063 BREAST TOMOSYNTHESIS BI: CPT

## 2021-06-28 PROCEDURE — 77067 SCR MAMMO BI INCL CAD: CPT | Mod: 26

## 2021-06-28 PROCEDURE — 77063 BREAST TOMOSYNTHESIS BI: CPT | Mod: 26

## 2021-08-18 ENCOUNTER — RX RENEWAL (OUTPATIENT)
Age: 81
End: 2021-08-18

## 2021-08-25 ENCOUNTER — NON-APPOINTMENT (OUTPATIENT)
Age: 81
End: 2021-08-25

## 2021-08-25 ENCOUNTER — LABORATORY RESULT (OUTPATIENT)
Age: 81
End: 2021-08-25

## 2021-08-25 ENCOUNTER — APPOINTMENT (OUTPATIENT)
Dept: INTERNAL MEDICINE | Facility: CLINIC | Age: 81
End: 2021-08-25
Payer: MEDICARE

## 2021-08-25 VITALS
SYSTOLIC BLOOD PRESSURE: 128 MMHG | HEIGHT: 64 IN | DIASTOLIC BLOOD PRESSURE: 80 MMHG | WEIGHT: 130 LBS | OXYGEN SATURATION: 98 % | BODY MASS INDEX: 22.2 KG/M2 | RESPIRATION RATE: 16 BRPM | TEMPERATURE: 97.5 F | HEART RATE: 64 BPM

## 2021-08-25 DIAGNOSIS — R14.0 ABDOMINAL DISTENSION (GASEOUS): ICD-10-CM

## 2021-08-25 PROCEDURE — 93000 ELECTROCARDIOGRAM COMPLETE: CPT

## 2021-08-25 PROCEDURE — G0439: CPT

## 2021-08-25 RX ORDER — SERTRALINE HYDROCHLORIDE 50 MG/1
50 TABLET, FILM COATED ORAL DAILY
Qty: 90 | Refills: 0 | Status: DISCONTINUED | COMMUNITY
Start: 2020-02-10 | End: 2021-08-25

## 2021-08-28 PROBLEM — R14.0 ABDOMINAL BLOATING: Status: ACTIVE | Noted: 2017-09-11

## 2021-08-28 NOTE — REVIEW OF SYSTEMS
[Hearing Loss] : hearing loss [Joint Stiffness] : joint stiffness [Unsteady Walking] : ataxia [Negative] : Heme/Lymph [Fever] : no fever [Chills] : no chills [Fatigue] : no fatigue [Hot Flashes] : no hot flashes [Night Sweats] : no night sweats [Recent Change In Weight] : ~T no recent weight change [Discharge] : no discharge [Pain] : no pain [Redness] : no redness [Dryness] : no dryness  [Vision Problems] : no vision problems [Itching] : no itching [Earache] : no earache [Hoarseness] : no hoarseness [Nosebleed] : no nosebleeds [Nasal Discharge] : no nasal discharge [Sore Throat] : no sore throat [Postnasal Drip] : no postnasal drip [Chest Pain] : no chest pain [Palpitations] : no palpitations [Leg Claudication] : no leg claudication [Lower Ext Edema] : no lower extremity edema [Orthopnea] : no orthopnea [Shortness Of Breath] : no shortness of breath [Paroxysmal Nocturnal Dyspnea] : no paroxysmal nocturnal dyspnea [Wheezing] : no wheezing [Cough] : no cough [Dyspnea on Exertion] : no dyspnea on exertion [Nausea] : no nausea [Abdominal Pain] : no abdominal pain [Constipation] : constipation [Diarrhea] : diarrhea [Vomiting] : no vomiting [Heartburn] : no heartburn [Melena] : no melena [Dysuria] : no dysuria [Incontinence] : no incontinence [Nocturia] : nocturia [Poor Libido] : libido not poor [Hematuria] : no hematuria [Frequency] : no frequency [Vaginal Discharge] : no vaginal discharge [Joint Pain] : no joint pain [Joint Swelling] : no joint swelling [Muscle Weakness] : no muscle weakness [Muscle Pain] : no muscle pain [Back Pain] : no back pain [Mole Changes] : no mole changes [Nail Changes] : no nail changes [Hair Changes] : no hair changes [Skin Rash] : no skin rash [Headache] : no headache [Dizziness] : no dizziness [Fainting] : no fainting [Confusion] : no confusion [Memory Loss] : no memory loss [Suicidal] : not suicidal [Insomnia] : no insomnia [Anxiety] : anxiety [Depression] : depression [Easy Bleeding] : no easy bleeding [Easy Bruising] : no easy bruising [Swollen Glands] : no swollen glands [FreeTextEntry7] : bloating

## 2021-08-28 NOTE — HISTORY OF PRESENT ILLNESS
[FreeTextEntry1] : Comes to the office for a comprehensive physical examination to review her medications and discuss her overall health recent issues with abdominal bloating [de-identified] : Comes to the office for a comprehensive physical examination with a history of colon cancer hypertension depression bilateral hearing loss and occasional abdominal bloating and constipation denies temperature chills sweats or myalgias she has had no headache sinus congestion sore throat cough wheezing pleurisy chest pain shortness of breath exertional dyspnea lightheadedness palpitations dizziness vertigo or syncope she has had no abdominal pain she gets occasional bloating and mild constipation she has had no nausea or vomiting she denies diarrhea bright red blood per rectum or black stools her appetite has been good her weight has been stable she does get up at night to urinate but denies dysuria or gross hematuria she has had no specific musculoskeletal complaints although her joints are stiff in the morning she has had no skin rashes leg edema he does have bouts of anxiety and depression but has been sleeping adequately

## 2021-08-28 NOTE — HEALTH RISK ASSESSMENT
[] : No [Yes] : Yes [HIV test declined] : HIV test declined [Hepatitis C test declined] : Hepatitis C test declined [MammogramDate] : 06/21 [BoneDensityDate] : 06/20 [ColonoscopyDate] : 08/20

## 2021-08-28 NOTE — PHYSICAL EXAM

## 2021-08-28 NOTE — ASSESSMENT
[FreeTextEntry1] : Physical examination shows a well-developed woman in no acute distress blood pressure 128/80 height 5 feet 4 inches weight 130 pounds BMI 22.31 temperature 97.5 °F heart rate of 64 respirations 16 oxygen saturation on room air was 98% HEENT was unremarkable chest was clear cardiovascular exam was regular with no extra heart sounds or murmurs abdomen was soft extremities showed no edema neurologic exam was nonfocal EKG showed a sinus bradycardia there were negative precordial T waves which have been seen on previous cardiograms there was poor R wave progression again chronic deceived a mammogram in June 2021 and bone density of in June 2020 and a colonoscopy in August 2020 he has received both doses of the COVID-19 vaccine and is up-to-date with her influenza vaccine we will attempt to find her pneumonia vaccine record from her pharmacy she will consider the Shingrix vaccine she is up-to-date with her ophthalmologist and dermatologist blood pressure is well controlled at the present visit she uses her hearing aids for her hearing loss with success recent bowels have been moving well with no abdominal bloating her anxiety and depression she feels she is in a good place at present a slip was given for complete blood tests including CBC full chemistries lipid profile thyroid profile urinalysis CRP hemoglobin A1c vitamins D3 and B12 and COVID-19 nuclear capsid antibody

## 2021-10-09 ENCOUNTER — RX RENEWAL (OUTPATIENT)
Age: 81
End: 2021-10-09

## 2021-10-27 LAB
25(OH)D3 SERPL-MCNC: 53.7 NG/ML
ALBUMIN SERPL ELPH-MCNC: 4.4 G/DL
ALP BLD-CCNC: 49 U/L
ALT SERPL-CCNC: 20 U/L
ANION GAP SERPL CALC-SCNC: 18 MMOL/L
APPEARANCE: ABNORMAL
AST SERPL-CCNC: 26 U/L
BASOPHILS # BLD AUTO: 0.05 K/UL
BASOPHILS NFR BLD AUTO: 0.7 %
BILIRUB SERPL-MCNC: 0.5 MG/DL
BILIRUBIN URINE: NEGATIVE
BLOOD URINE: NEGATIVE
BUN SERPL-MCNC: 23 MG/DL
CALCIUM SERPL-MCNC: 9.3 MG/DL
CHLORIDE SERPL-SCNC: 107 MMOL/L
CHOLEST SERPL-MCNC: 235 MG/DL
CO2 SERPL-SCNC: 16 MMOL/L
COLOR: YELLOW
COVID-19 NUCLEOCAPSID  GAM ANTIBODY INTERPRETATION: NEGATIVE
CREAT SERPL-MCNC: 0.91 MG/DL
CRP SERPL HS-MCNC: 0.79 MG/L
EOSINOPHIL # BLD AUTO: 0.12 K/UL
EOSINOPHIL NFR BLD AUTO: 1.6 %
ESTIMATED AVERAGE GLUCOSE: 105 MG/DL
GLUCOSE BS SERPL-MCNC: 90 MG/DL
GLUCOSE QUALITATIVE U: NEGATIVE
GLUCOSE SERPL-MCNC: 98 MG/DL
HBA1C MFR BLD HPLC: 5.3 %
HCT VFR BLD CALC: 40.8 %
HDLC SERPL-MCNC: 110 MG/DL
HGB BLD-MCNC: 13.6 G/DL
IMM GRANULOCYTES NFR BLD AUTO: 0.3 %
KETONES URINE: NORMAL
LDLC SERPL CALC-MCNC: 106 MG/DL
LEUKOCYTE ESTERASE URINE: NEGATIVE
LYMPHOCYTES # BLD AUTO: 1.71 K/UL
LYMPHOCYTES NFR BLD AUTO: 23 %
MAN DIFF?: NORMAL
MCHC RBC-ENTMCNC: 33.3 GM/DL
MCHC RBC-ENTMCNC: 33.7 PG
MCV RBC AUTO: 101.2 FL
MONOCYTES # BLD AUTO: 0.84 K/UL
MONOCYTES NFR BLD AUTO: 11.3 %
NEUTROPHILS # BLD AUTO: 4.7 K/UL
NEUTROPHILS NFR BLD AUTO: 63.1 %
NITRITE URINE: NEGATIVE
NONHDLC SERPL-MCNC: 124 MG/DL
PH URINE: 6
PLATELET # BLD AUTO: 195 K/UL
POTASSIUM SERPL-SCNC: 4 MMOL/L
PROT SERPL-MCNC: 6.8 G/DL
PROTEIN URINE: ABNORMAL
RBC # BLD: 4.03 M/UL
RBC # FLD: 13.7 %
SARS-COV-2 AB SERPL QL IA: 0.09 INDEX
SODIUM SERPL-SCNC: 141 MMOL/L
SPECIFIC GRAVITY URINE: >=1.03
T4 FREE SERPL-MCNC: 1 NG/DL
TRIGL SERPL-MCNC: 93 MG/DL
TSH SERPL-ACNC: 4.09 UIU/ML
UROBILINOGEN URINE: NORMAL
VIT B12 SERPL-MCNC: 1177 PG/ML
WBC # FLD AUTO: 7.44 K/UL

## 2022-01-05 ENCOUNTER — RX RENEWAL (OUTPATIENT)
Age: 82
End: 2022-01-05

## 2022-07-08 ENCOUNTER — RESULT REVIEW (OUTPATIENT)
Age: 82
End: 2022-07-08

## 2022-07-08 ENCOUNTER — OUTPATIENT (OUTPATIENT)
Dept: OUTPATIENT SERVICES | Facility: HOSPITAL | Age: 82
LOS: 1 days | End: 2022-07-08
Payer: COMMERCIAL

## 2022-07-08 ENCOUNTER — APPOINTMENT (OUTPATIENT)
Dept: MAMMOGRAPHY | Facility: HOSPITAL | Age: 82
End: 2022-07-08

## 2022-07-08 DIAGNOSIS — Z00.8 ENCOUNTER FOR OTHER GENERAL EXAMINATION: ICD-10-CM

## 2022-07-08 DIAGNOSIS — Z90.89 ACQUIRED ABSENCE OF OTHER ORGANS: Chronic | ICD-10-CM

## 2022-07-08 DIAGNOSIS — Z90.49 ACQUIRED ABSENCE OF OTHER SPECIFIED PARTS OF DIGESTIVE TRACT: Chronic | ICD-10-CM

## 2022-07-08 PROCEDURE — 77067 SCR MAMMO BI INCL CAD: CPT

## 2022-07-08 PROCEDURE — 77067 SCR MAMMO BI INCL CAD: CPT | Mod: 26

## 2022-07-08 PROCEDURE — 77063 BREAST TOMOSYNTHESIS BI: CPT | Mod: 26

## 2022-07-08 PROCEDURE — 77063 BREAST TOMOSYNTHESIS BI: CPT

## 2022-07-15 ENCOUNTER — APPOINTMENT (OUTPATIENT)
Dept: INTERNAL MEDICINE | Facility: CLINIC | Age: 82
End: 2022-07-15

## 2022-08-08 LAB
25(OH)D3 SERPL-MCNC: 62.4 NG/ML
ALBUMIN SERPL ELPH-MCNC: 4.5 G/DL
ALP BLD-CCNC: 55 U/L
ALT SERPL-CCNC: 23 U/L
ANION GAP SERPL CALC-SCNC: 10 MMOL/L
APPEARANCE: CLEAR
AST SERPL-CCNC: 31 U/L
BASOPHILS # BLD AUTO: 0.06 K/UL
BASOPHILS NFR BLD AUTO: 0.8 %
BILIRUB SERPL-MCNC: 0.5 MG/DL
BILIRUBIN URINE: NEGATIVE
BLOOD URINE: NEGATIVE
BUN SERPL-MCNC: 12 MG/DL
CALCIUM SERPL-MCNC: 9.2 MG/DL
CHLORIDE SERPL-SCNC: 106 MMOL/L
CHOLEST SERPL-MCNC: 254 MG/DL
CO2 SERPL-SCNC: 26 MMOL/L
COLOR: YELLOW
COVID-19 NUCLEOCAPSID  GAM ANTIBODY INTERPRETATION: NEGATIVE
COVID-19 SPIKE DOMAIN ANTIBODY INTERPRETATION: POSITIVE
CREAT SERPL-MCNC: 0.88 MG/DL
CRP SERPL HS-MCNC: 0.55 MG/L
EGFR: 65 ML/MIN/1.73M2
EOSINOPHIL # BLD AUTO: 0.23 K/UL
EOSINOPHIL NFR BLD AUTO: 3 %
GLUCOSE BS SERPL-MCNC: 91 MG/DL
GLUCOSE QUALITATIVE U: NEGATIVE
GLUCOSE SERPL-MCNC: 94 MG/DL
HCT VFR BLD CALC: 41.8 %
HDLC SERPL-MCNC: 107 MG/DL
HGB BLD-MCNC: 13.8 G/DL
IMM GRANULOCYTES NFR BLD AUTO: 0.3 %
KETONES URINE: NEGATIVE
LDLC SERPL CALC-MCNC: 114 MG/DL
LEUKOCYTE ESTERASE URINE: NEGATIVE
LYMPHOCYTES # BLD AUTO: 1.68 K/UL
LYMPHOCYTES NFR BLD AUTO: 21.9 %
MAN DIFF?: NORMAL
MCHC RBC-ENTMCNC: 33 GM/DL
MCHC RBC-ENTMCNC: 33.3 PG
MCV RBC AUTO: 100.7 FL
MONOCYTES # BLD AUTO: 0.74 K/UL
MONOCYTES NFR BLD AUTO: 9.6 %
NEUTROPHILS # BLD AUTO: 4.95 K/UL
NEUTROPHILS NFR BLD AUTO: 64.4 %
NITRITE URINE: NEGATIVE
NONHDLC SERPL-MCNC: 147 MG/DL
PH URINE: 6.5
PLATELET # BLD AUTO: 224 K/UL
POTASSIUM SERPL-SCNC: 4.4 MMOL/L
PROT SERPL-MCNC: 6.8 G/DL
PROTEIN URINE: NEGATIVE
RBC # BLD: 4.15 M/UL
RBC # FLD: 13.5 %
SARS-COV-2 AB SERPL IA-ACNC: >250 U/ML
SARS-COV-2 AB SERPL QL IA: 0.07 INDEX
SODIUM SERPL-SCNC: 142 MMOL/L
SPECIFIC GRAVITY URINE: 1.02
T4 FREE SERPL-MCNC: 0.9 NG/DL
TRIGL SERPL-MCNC: 165 MG/DL
TSH SERPL-ACNC: 4.91 UIU/ML
UROBILINOGEN URINE: NORMAL
VIT B12 SERPL-MCNC: 1326 PG/ML
WBC # FLD AUTO: 7.68 K/UL

## 2022-08-09 LAB
ESTIMATED AVERAGE GLUCOSE: 108 MG/DL
HBA1C MFR BLD HPLC: 5.4 %

## 2022-08-19 ENCOUNTER — RX RENEWAL (OUTPATIENT)
Age: 82
End: 2022-08-19

## 2022-08-25 ENCOUNTER — NON-APPOINTMENT (OUTPATIENT)
Age: 82
End: 2022-08-25

## 2022-08-25 ENCOUNTER — APPOINTMENT (OUTPATIENT)
Dept: INTERNAL MEDICINE | Facility: CLINIC | Age: 82
End: 2022-08-25

## 2022-08-25 ENCOUNTER — MED ADMIN CHARGE (OUTPATIENT)
Age: 82
End: 2022-08-25

## 2022-08-25 VITALS
DIASTOLIC BLOOD PRESSURE: 70 MMHG | HEIGHT: 64 IN | SYSTOLIC BLOOD PRESSURE: 128 MMHG | WEIGHT: 135 LBS | BODY MASS INDEX: 23.05 KG/M2 | TEMPERATURE: 97.7 F | OXYGEN SATURATION: 97 % | HEART RATE: 65 BPM | RESPIRATION RATE: 16 BRPM

## 2022-08-25 DIAGNOSIS — R00.2 PALPITATIONS: ICD-10-CM

## 2022-08-25 DIAGNOSIS — Z23 ENCOUNTER FOR IMMUNIZATION: ICD-10-CM

## 2022-08-25 PROCEDURE — G0439: CPT

## 2022-08-25 PROCEDURE — 90662 IIV NO PRSV INCREASED AG IM: CPT

## 2022-08-25 PROCEDURE — 93000 ELECTROCARDIOGRAM COMPLETE: CPT | Mod: 59

## 2022-08-25 PROCEDURE — G0008: CPT

## 2022-09-04 PROBLEM — R00.2 PALPITATIONS: Status: ACTIVE | Noted: 2019-03-04

## 2022-09-04 NOTE — ASSESSMENT
[FreeTextEntry1] : Physical exam shows a well-developed elderly woman in no acute distress blood pressure 128/70 height 5.4 inches weight 135 pounds BMI 23.171 temperature 97.7 °F heart rate of 65 respirations 16 oxygen saturation on room air 97% HEENT was unremarkable chest was clear cardiovascular exam was regular abdomen was soft extremities showed no edema neurologic exam was nonfocal patient follows with her gynecologist Dr. Harris patient had her last mammogram in July 2022 patient had recent blood test which were significant for total cholesterol of 254 triglycerides 165 HDL of 107 LDL of 114 vitamin D62.4 Hemoglobin A1c 5.4% patient has received their 2 COVID-19 vaccines and today received the high-dose influenza vaccine she was recommended can consider the Prevnar 20 and the Shingrix vaccines as well patient's blood pressure is well controlled at the present visit she has been doing well emotionally with her sertraline 100 mg a day she does come back occasional diarrhea with cholestyramine might but has not needed it recently she is up-to-date with her ophthalmologist she defers on dermatology check for skin cancer and colorectal cancer screening having her last colonoscopy in August 2020 which was normal EKG shows a normal sinus rhythm at 60 was no acute ST-T wave changes

## 2022-09-04 NOTE — HISTORY OF PRESENT ILLNESS
[FreeTextEntry1] : 82-year-old woman comes to the office for a comprehensive physical examination to review her medications and discuss her overall health Chief complaint revolves around her hearing loss and urinary frequency [de-identified] : comes to the office for a comprehensive physical examination with a history of colon cancer hypertension depression hearing loss patient's review of systems is significant for hearing loss constipation with occasional bloating nocturia urinary frequency joint stiffness unsteady gait anxiety and depression other review of systems is otherwise noncontributory

## 2022-09-04 NOTE — HEALTH RISK ASSESSMENT
[HIV test declined] : HIV test declined [Hepatitis C test declined] : Hepatitis C test declined [Former] : Former [Yes] : Yes [No falls in past year] : Patient reported no falls in the past year [0] : 2) Feeling down, depressed, or hopeless: Not at all (0) [PHQ-2 Negative - No further assessment needed] : PHQ-2 Negative - No further assessment needed [[id number]] : 0 [MammogramDate] : 07/22 [ColonoscopyDate] : 08/2020

## 2022-09-04 NOTE — REVIEW OF SYSTEMS
[Hearing Loss] : hearing loss [Constipation] : constipation [Nocturia] : nocturia [Joint Stiffness] : joint stiffness [Unsteady Walking] : ataxia [Anxiety] : anxiety [Depression] : depression [Negative] : Heme/Lymph [Frequency] : frequency [Fever] : no fever [Chills] : no chills [Fatigue] : no fatigue [Hot Flashes] : no hot flashes [Night Sweats] : no night sweats [Recent Change In Weight] : ~T no recent weight change [Discharge] : no discharge [Pain] : no pain [Redness] : no redness [Dryness] : no dryness  [Vision Problems] : no vision problems [Itching] : no itching [Earache] : no earache [Nosebleed] : no nosebleeds [Hoarseness] : no hoarseness [Nasal Discharge] : no nasal discharge [Sore Throat] : no sore throat [Postnasal Drip] : no postnasal drip [Chest Pain] : no chest pain [Palpitations] : no palpitations [Leg Claudication] : no leg claudication [Lower Ext Edema] : no lower extremity edema [Orthopnea] : no orthopnea [Paroxysmal Nocturnal Dyspnea] : no paroxysmal nocturnal dyspnea [Shortness Of Breath] : no shortness of breath [Wheezing] : no wheezing [Cough] : no cough [Dyspnea on Exertion] : no dyspnea on exertion [Abdominal Pain] : no abdominal pain [Nausea] : no nausea [Diarrhea] : diarrhea [Vomiting] : no vomiting [Heartburn] : no heartburn [Melena] : no melena [Dysuria] : no dysuria [Incontinence] : no incontinence [Poor Libido] : libido not poor [Hematuria] : no hematuria [Vaginal Discharge] : no vaginal discharge [Joint Pain] : no joint pain [Joint Swelling] : no joint swelling [Muscle Weakness] : no muscle weakness [Muscle Pain] : no muscle pain [Back Pain] : no back pain [Itching] : no itching [Mole Changes] : no mole changes [Nail Changes] : no nail changes [Hair Changes] : no hair changes [Skin Rash] : no skin rash [Headache] : no headache [Dizziness] : no dizziness [Fainting] : no fainting [Confusion] : no confusion [Memory Loss] : no memory loss [Suicidal] : not suicidal [Insomnia] : no insomnia [Easy Bleeding] : no easy bleeding [Easy Bruising] : no easy bruising [Swollen Glands] : no swollen glands [[email or web address]] : bloating

## 2022-10-03 ENCOUNTER — RX RENEWAL (OUTPATIENT)
Age: 82
End: 2022-10-03

## 2023-02-13 ENCOUNTER — RX RENEWAL (OUTPATIENT)
Age: 83
End: 2023-02-13

## 2023-02-14 ENCOUNTER — APPOINTMENT (OUTPATIENT)
Dept: RADIOLOGY | Facility: HOSPITAL | Age: 83
End: 2023-02-14
Payer: MEDICARE

## 2023-02-14 ENCOUNTER — OUTPATIENT (OUTPATIENT)
Dept: OUTPATIENT SERVICES | Facility: HOSPITAL | Age: 83
LOS: 1 days | End: 2023-02-14
Payer: COMMERCIAL

## 2023-02-14 DIAGNOSIS — Z90.89 ACQUIRED ABSENCE OF OTHER ORGANS: Chronic | ICD-10-CM

## 2023-02-14 DIAGNOSIS — Z00.8 ENCOUNTER FOR OTHER GENERAL EXAMINATION: ICD-10-CM

## 2023-02-14 PROCEDURE — 73630 X-RAY EXAM OF FOOT: CPT | Mod: 26,LT

## 2023-02-14 PROCEDURE — 73630 X-RAY EXAM OF FOOT: CPT

## 2023-03-06 ENCOUNTER — TRANSCRIPTION ENCOUNTER (OUTPATIENT)
Age: 83
End: 2023-03-06

## 2023-03-07 ENCOUNTER — TRANSCRIPTION ENCOUNTER (OUTPATIENT)
Age: 83
End: 2023-03-07

## 2023-03-07 ENCOUNTER — INPATIENT (INPATIENT)
Facility: HOSPITAL | Age: 83
LOS: 1 days | Discharge: NURSING, REHAB OR LONG-TERM CARE | DRG: 481 | End: 2023-03-09
Attending: FAMILY MEDICINE | Admitting: STUDENT IN AN ORGANIZED HEALTH CARE EDUCATION/TRAINING PROGRAM
Payer: COMMERCIAL

## 2023-03-07 VITALS
SYSTOLIC BLOOD PRESSURE: 179 MMHG | WEIGHT: 130.07 LBS | OXYGEN SATURATION: 99 % | HEIGHT: 64 IN | RESPIRATION RATE: 16 BRPM | DIASTOLIC BLOOD PRESSURE: 82 MMHG | HEART RATE: 69 BPM | TEMPERATURE: 97 F

## 2023-03-07 DIAGNOSIS — H91.90 UNSPECIFIED HEARING LOSS, UNSPECIFIED EAR: ICD-10-CM

## 2023-03-07 DIAGNOSIS — Z90.89 ACQUIRED ABSENCE OF OTHER ORGANS: Chronic | ICD-10-CM

## 2023-03-07 DIAGNOSIS — I10 ESSENTIAL (PRIMARY) HYPERTENSION: ICD-10-CM

## 2023-03-07 DIAGNOSIS — Z29.9 ENCOUNTER FOR PROPHYLACTIC MEASURES, UNSPECIFIED: ICD-10-CM

## 2023-03-07 DIAGNOSIS — S72.001A FRACTURE OF UNSPECIFIED PART OF NECK OF RIGHT FEMUR, INITIAL ENCOUNTER FOR CLOSED FRACTURE: ICD-10-CM

## 2023-03-07 DIAGNOSIS — Z85.038 PERSONAL HISTORY OF OTHER MALIGNANT NEOPLASM OF LARGE INTESTINE: ICD-10-CM

## 2023-03-07 DIAGNOSIS — D72.829 ELEVATED WHITE BLOOD CELL COUNT, UNSPECIFIED: ICD-10-CM

## 2023-03-07 DIAGNOSIS — F32.A DEPRESSION, UNSPECIFIED: ICD-10-CM

## 2023-03-07 DIAGNOSIS — Z90.49 ACQUIRED ABSENCE OF OTHER SPECIFIED PARTS OF DIGESTIVE TRACT: Chronic | ICD-10-CM

## 2023-03-07 DIAGNOSIS — R74.01 ELEVATION OF LEVELS OF LIVER TRANSAMINASE LEVELS: ICD-10-CM

## 2023-03-07 LAB
ALBUMIN SERPL ELPH-MCNC: 3.5 G/DL — SIGNIFICANT CHANGE UP (ref 3.3–5)
ALP SERPL-CCNC: 57 U/L — SIGNIFICANT CHANGE UP (ref 40–120)
ALT FLD-CCNC: 42 U/L — SIGNIFICANT CHANGE UP (ref 10–45)
ANION GAP SERPL CALC-SCNC: 11 MMOL/L — SIGNIFICANT CHANGE UP (ref 5–17)
AST SERPL-CCNC: 69 U/L — HIGH (ref 10–40)
BASOPHILS # BLD AUTO: 0.07 K/UL — SIGNIFICANT CHANGE UP (ref 0–0.2)
BASOPHILS NFR BLD AUTO: 0.5 % — SIGNIFICANT CHANGE UP (ref 0–2)
BILIRUB SERPL-MCNC: 0.5 MG/DL — SIGNIFICANT CHANGE UP (ref 0.2–1.2)
BUN SERPL-MCNC: 18 MG/DL — SIGNIFICANT CHANGE UP (ref 7–23)
CALCIUM SERPL-MCNC: 8.7 MG/DL — SIGNIFICANT CHANGE UP (ref 8.4–10.5)
CHLORIDE SERPL-SCNC: 103 MMOL/L — SIGNIFICANT CHANGE UP (ref 96–108)
CO2 SERPL-SCNC: 25 MMOL/L — SIGNIFICANT CHANGE UP (ref 22–31)
CREAT SERPL-MCNC: 0.68 MG/DL — SIGNIFICANT CHANGE UP (ref 0.5–1.3)
EGFR: 86 ML/MIN/1.73M2 — SIGNIFICANT CHANGE UP
EOSINOPHIL # BLD AUTO: 0.2 K/UL — SIGNIFICANT CHANGE UP (ref 0–0.5)
EOSINOPHIL NFR BLD AUTO: 1.5 % — SIGNIFICANT CHANGE UP (ref 0–6)
GLUCOSE SERPL-MCNC: 105 MG/DL — HIGH (ref 70–99)
HCT VFR BLD CALC: 43 % — SIGNIFICANT CHANGE UP (ref 34.5–45)
HGB BLD-MCNC: 14.4 G/DL — SIGNIFICANT CHANGE UP (ref 11.5–15.5)
IMM GRANULOCYTES NFR BLD AUTO: 1.6 % — HIGH (ref 0–0.9)
INR BLD: 1.11 RATIO — SIGNIFICANT CHANGE UP (ref 0.88–1.16)
LYMPHOCYTES # BLD AUTO: 1.81 K/UL — SIGNIFICANT CHANGE UP (ref 1–3.3)
LYMPHOCYTES # BLD AUTO: 13.3 % — SIGNIFICANT CHANGE UP (ref 13–44)
MCHC RBC-ENTMCNC: 33.5 GM/DL — SIGNIFICANT CHANGE UP (ref 32–36)
MCHC RBC-ENTMCNC: 33.6 PG — SIGNIFICANT CHANGE UP (ref 27–34)
MCV RBC AUTO: 100.5 FL — HIGH (ref 80–100)
MONOCYTES # BLD AUTO: 1.03 K/UL — HIGH (ref 0–0.9)
MONOCYTES NFR BLD AUTO: 7.6 % — SIGNIFICANT CHANGE UP (ref 2–14)
NEUTROPHILS # BLD AUTO: 10.31 K/UL — HIGH (ref 1.8–7.4)
NEUTROPHILS NFR BLD AUTO: 75.5 % — SIGNIFICANT CHANGE UP (ref 43–77)
NRBC # BLD: 0 /100 WBCS — SIGNIFICANT CHANGE UP (ref 0–0)
PLATELET # BLD AUTO: 201 K/UL — SIGNIFICANT CHANGE UP (ref 150–400)
POTASSIUM SERPL-MCNC: 5 MMOL/L — SIGNIFICANT CHANGE UP (ref 3.5–5.3)
POTASSIUM SERPL-SCNC: 5 MMOL/L — SIGNIFICANT CHANGE UP (ref 3.5–5.3)
PROT SERPL-MCNC: 7.2 G/DL — SIGNIFICANT CHANGE UP (ref 6–8.3)
PROTHROM AB SERPL-ACNC: 12.9 SEC — SIGNIFICANT CHANGE UP (ref 10.5–13.4)
RBC # BLD: 4.28 M/UL — SIGNIFICANT CHANGE UP (ref 3.8–5.2)
RBC # FLD: 13.5 % — SIGNIFICANT CHANGE UP (ref 10.3–14.5)
SARS-COV-2 RNA SPEC QL NAA+PROBE: SIGNIFICANT CHANGE UP
SODIUM SERPL-SCNC: 139 MMOL/L — SIGNIFICANT CHANGE UP (ref 135–145)
WBC # BLD: 13.64 K/UL — HIGH (ref 3.8–10.5)
WBC # FLD AUTO: 13.64 K/UL — HIGH (ref 3.8–10.5)

## 2023-03-07 PROCEDURE — 93010 ELECTROCARDIOGRAM REPORT: CPT

## 2023-03-07 PROCEDURE — 99285 EMERGENCY DEPT VISIT HI MDM: CPT

## 2023-03-07 PROCEDURE — 73501 X-RAY EXAM HIP UNI 1 VIEW: CPT | Mod: 26,RT

## 2023-03-07 PROCEDURE — 71045 X-RAY EXAM CHEST 1 VIEW: CPT | Mod: 26

## 2023-03-07 PROCEDURE — 27245 TREAT THIGH FRACTURE: CPT | Mod: RT

## 2023-03-07 PROCEDURE — 27245 TREAT THIGH FRACTURE: CPT | Mod: AS,RT

## 2023-03-07 PROCEDURE — 99223 1ST HOSP IP/OBS HIGH 75: CPT

## 2023-03-07 DEVICE — SCREW LOK F/T T2 5X50MM VIT STRL: Type: IMPLANTABLE DEVICE | Site: RIGHT | Status: FUNCTIONAL

## 2023-03-07 DEVICE — SCREW LAG GAMMA 3 TI 10.5X95MM: Type: IMPLANTABLE DEVICE | Site: RIGHT | Status: FUNCTIONAL

## 2023-03-07 DEVICE — K-WIRE STRYKER 3.2M X 450MM: Type: IMPLANTABLE DEVICE | Site: RIGHT | Status: FUNCTIONAL

## 2023-03-07 DEVICE — SCREW LOCKING 5X47.5MM: Type: IMPLANTABLE DEVICE | Site: RIGHT | Status: FUNCTIONAL

## 2023-03-07 DEVICE — GUIDEWIRE BALL TIP 3MM X 1000MM FOR T2 R1.5 FEMORAL NAILING SYSTEM: Type: IMPLANTABLE DEVICE | Site: RIGHT | Status: FUNCTIONAL

## 2023-03-07 DEVICE — IMPLANTABLE DEVICE: Type: IMPLANTABLE DEVICE | Site: RIGHT | Status: FUNCTIONAL

## 2023-03-07 RX ORDER — ONDANSETRON 8 MG/1
4 TABLET, FILM COATED ORAL ONCE
Refills: 0 | Status: DISCONTINUED | OUTPATIENT
Start: 2023-03-07 | End: 2023-03-07

## 2023-03-07 RX ORDER — SERTRALINE 25 MG/1
100 TABLET, FILM COATED ORAL DAILY
Refills: 0 | Status: DISCONTINUED | OUTPATIENT
Start: 2023-03-07 | End: 2023-03-07

## 2023-03-07 RX ORDER — SERTRALINE 25 MG/1
100 TABLET, FILM COATED ORAL DAILY
Refills: 0 | Status: DISCONTINUED | OUTPATIENT
Start: 2023-03-07 | End: 2023-03-09

## 2023-03-07 RX ORDER — ONDANSETRON 8 MG/1
4 TABLET, FILM COATED ORAL EVERY 8 HOURS
Refills: 0 | Status: DISCONTINUED | OUTPATIENT
Start: 2023-03-07 | End: 2023-03-09

## 2023-03-07 RX ORDER — SODIUM CHLORIDE 9 MG/ML
1000 INJECTION, SOLUTION INTRAVENOUS
Refills: 0 | Status: DISCONTINUED | OUTPATIENT
Start: 2023-03-07 | End: 2023-03-09

## 2023-03-07 RX ORDER — ACETAMINOPHEN 500 MG
1000 TABLET ORAL ONCE
Refills: 0 | Status: COMPLETED | OUTPATIENT
Start: 2023-03-08 | End: 2023-03-08

## 2023-03-07 RX ORDER — OXYCODONE HYDROCHLORIDE 5 MG/1
5 TABLET ORAL
Refills: 0 | Status: DISCONTINUED | OUTPATIENT
Start: 2023-03-07 | End: 2023-03-09

## 2023-03-07 RX ORDER — HYDROMORPHONE HYDROCHLORIDE 2 MG/ML
0.5 INJECTION INTRAMUSCULAR; INTRAVENOUS; SUBCUTANEOUS
Refills: 0 | Status: DISCONTINUED | OUTPATIENT
Start: 2023-03-07 | End: 2023-03-07

## 2023-03-07 RX ORDER — SENNA PLUS 8.6 MG/1
2 TABLET ORAL AT BEDTIME
Refills: 0 | Status: DISCONTINUED | OUTPATIENT
Start: 2023-03-07 | End: 2023-03-09

## 2023-03-07 RX ORDER — NALOXONE HYDROCHLORIDE 4 MG/.1ML
0.4 SPRAY NASAL ONCE
Refills: 0 | Status: DISCONTINUED | OUTPATIENT
Start: 2023-03-07 | End: 2023-03-09

## 2023-03-07 RX ORDER — HYDROMORPHONE HYDROCHLORIDE 2 MG/ML
0.25 INJECTION INTRAMUSCULAR; INTRAVENOUS; SUBCUTANEOUS
Refills: 0 | Status: DISCONTINUED | OUTPATIENT
Start: 2023-03-07 | End: 2023-03-07

## 2023-03-07 RX ORDER — POLYETHYLENE GLYCOL 3350 17 G/17G
17 POWDER, FOR SOLUTION ORAL DAILY
Refills: 0 | Status: DISCONTINUED | OUTPATIENT
Start: 2023-03-07 | End: 2023-03-09

## 2023-03-07 RX ORDER — POLYETHYLENE GLYCOL 3350 17 G/17G
17 POWDER, FOR SOLUTION ORAL DAILY
Refills: 0 | Status: DISCONTINUED | OUTPATIENT
Start: 2023-03-07 | End: 2023-03-07

## 2023-03-07 RX ORDER — MORPHINE SULFATE 50 MG/1
4 CAPSULE, EXTENDED RELEASE ORAL EVERY 4 HOURS
Refills: 0 | Status: DISCONTINUED | OUTPATIENT
Start: 2023-03-07 | End: 2023-03-07

## 2023-03-07 RX ORDER — MORPHINE SULFATE 50 MG/1
2 CAPSULE, EXTENDED RELEASE ORAL EVERY 4 HOURS
Refills: 0 | Status: DISCONTINUED | OUTPATIENT
Start: 2023-03-07 | End: 2023-03-07

## 2023-03-07 RX ORDER — AMLODIPINE BESYLATE 2.5 MG/1
5 TABLET ORAL DAILY
Refills: 0 | Status: DISCONTINUED | OUTPATIENT
Start: 2023-03-07 | End: 2023-03-09

## 2023-03-07 RX ORDER — ACETAMINOPHEN 500 MG
975 TABLET ORAL EVERY 8 HOURS
Refills: 0 | Status: DISCONTINUED | OUTPATIENT
Start: 2023-03-07 | End: 2023-03-09

## 2023-03-07 RX ORDER — OXYCODONE HYDROCHLORIDE 5 MG/1
10 TABLET ORAL
Refills: 0 | Status: DISCONTINUED | OUTPATIENT
Start: 2023-03-07 | End: 2023-03-09

## 2023-03-07 RX ORDER — AMLODIPINE BESYLATE 2.5 MG/1
5 TABLET ORAL DAILY
Refills: 0 | Status: DISCONTINUED | OUTPATIENT
Start: 2023-03-07 | End: 2023-03-07

## 2023-03-07 RX ORDER — SODIUM CHLORIDE 9 MG/ML
1000 INJECTION INTRAMUSCULAR; INTRAVENOUS; SUBCUTANEOUS
Refills: 0 | Status: DISCONTINUED | OUTPATIENT
Start: 2023-03-07 | End: 2023-03-09

## 2023-03-07 RX ORDER — ACETAMINOPHEN 500 MG
1000 TABLET ORAL EVERY 8 HOURS
Refills: 0 | Status: DISCONTINUED | OUTPATIENT
Start: 2023-03-07 | End: 2023-03-07

## 2023-03-07 RX ORDER — MORPHINE SULFATE 50 MG/1
4 CAPSULE, EXTENDED RELEASE ORAL ONCE
Refills: 0 | Status: DISCONTINUED | OUTPATIENT
Start: 2023-03-07 | End: 2023-03-07

## 2023-03-07 RX ORDER — CEFAZOLIN SODIUM 1 G
2000 VIAL (EA) INJECTION EVERY 8 HOURS
Refills: 0 | Status: COMPLETED | OUTPATIENT
Start: 2023-03-08 | End: 2023-03-08

## 2023-03-07 RX ORDER — LANOLIN ALCOHOL/MO/W.PET/CERES
3 CREAM (GRAM) TOPICAL AT BEDTIME
Refills: 0 | Status: DISCONTINUED | OUTPATIENT
Start: 2023-03-07 | End: 2023-03-09

## 2023-03-07 RX ORDER — SODIUM CHLORIDE 9 MG/ML
1000 INJECTION, SOLUTION INTRAVENOUS
Refills: 0 | Status: DISCONTINUED | OUTPATIENT
Start: 2023-03-07 | End: 2023-03-07

## 2023-03-07 RX ORDER — INFLUENZA VIRUS VACCINE 15; 15; 15; 15 UG/.5ML; UG/.5ML; UG/.5ML; UG/.5ML
0.7 SUSPENSION INTRAMUSCULAR ONCE
Refills: 0 | Status: DISCONTINUED | OUTPATIENT
Start: 2023-03-07 | End: 2023-03-09

## 2023-03-07 RX ORDER — HYDROMORPHONE HYDROCHLORIDE 2 MG/ML
0.5 INJECTION INTRAMUSCULAR; INTRAVENOUS; SUBCUTANEOUS
Refills: 0 | Status: DISCONTINUED | OUTPATIENT
Start: 2023-03-07 | End: 2023-03-09

## 2023-03-07 RX ORDER — SENNA PLUS 8.6 MG/1
2 TABLET ORAL AT BEDTIME
Refills: 0 | Status: DISCONTINUED | OUTPATIENT
Start: 2023-03-07 | End: 2023-03-07

## 2023-03-07 RX ORDER — LANOLIN ALCOHOL/MO/W.PET/CERES
3 CREAM (GRAM) TOPICAL AT BEDTIME
Refills: 0 | Status: DISCONTINUED | OUTPATIENT
Start: 2023-03-07 | End: 2023-03-07

## 2023-03-07 RX ADMIN — MORPHINE SULFATE 4 MILLIGRAM(S): 50 CAPSULE, EXTENDED RELEASE ORAL at 11:52

## 2023-03-07 RX ADMIN — SENNA PLUS 2 TABLET(S): 8.6 TABLET ORAL at 23:03

## 2023-03-07 RX ADMIN — Medication 975 MILLIGRAM(S): at 13:01

## 2023-03-07 RX ADMIN — SERTRALINE 100 MILLIGRAM(S): 25 TABLET, FILM COATED ORAL at 23:04

## 2023-03-07 RX ADMIN — MORPHINE SULFATE 4 MILLIGRAM(S): 50 CAPSULE, EXTENDED RELEASE ORAL at 11:46

## 2023-03-07 RX ADMIN — Medication 975 MILLIGRAM(S): at 23:03

## 2023-03-07 RX ADMIN — Medication 975 MILLIGRAM(S): at 12:31

## 2023-03-07 NOTE — ED PROVIDER NOTE - ATTENDING APP SHARED VISIT CONTRIBUTION OF CARE
82 year old female with no pmhx presents with HTN BIB EMS s/p slipping on the stairs and falling on her right hip. She notes that as long as she does not move her leg she is not in any pain- however slight movement elicits extreme pain. Her leg is externally rotated, adducted and shortened. Denies knee, ankle, foot pain, hitting her head, LOC, dizziness. Patient lives alone, near her granddaughter and able to ambulate independently with no assistance at baseline.  Right leg externally rotated, abducted, shortened. Slight movement elicits pain. Pain on palpation of lateral right hip. DP/PT 2+   xray showing right hip fx. pre op labs sent   Dr. Rojas called for consult, recommends admit to medicine   d/w Dr. Wynn for admission  Dr. Murphy:  I have reviewed and discussed with the PA/ resident the case specifics, including the history, physical assessment, evaluation, conclusion, laboratory results, and medical plan. I agree with the contents, and conclusions. I have personally examined, and interviewed the patient.

## 2023-03-07 NOTE — H&P ADULT - NSICDXFAMILYHX_GEN_ALL_CORE_FT
FAMILY HISTORY:  Grandparent  Still living? Unknown  FH: colon cancer, Age at diagnosis: Age Unknown

## 2023-03-07 NOTE — H&P ADULT - NSHPPHYSICALEXAM_GEN_ALL_CORE
T(C): 36.2 (03-07-23 @ 10:10), Max: 36.2 (03-07-23 @ 10:10)  HR: 69 (03-07-23 @ 10:10) (69 - 69)  BP: 179/82 (03-07-23 @ 10:10) (179/82 - 179/82)  RR: 16 (03-07-23 @ 10:10) (16 - 16)  SpO2: 99% (03-07-23 @ 10:10) (99% - 99%)    GENERAL: elderly female, appears in no acute distress, appropriate  EYES: sclera clear, no exudates  ENMT: oropharynx clear without erythema, no exudates, moist mucous membranes  NECK: supple, soft  LUNGS: good air entry bilaterally, clear to auscultation, symmetric breath sounds, no wheezing or rhonchi appreciated  HEART: soft S1/S2, regular rate and rhythm, no murmurs noted, no lower extremity edema  GASTROINTESTINAL: abdomen is soft, nontender, nondistended, normoactive bowel sounds  INTEGUMENT: warm, well-perfused, good skin turgor  MUSCULOSKELETAL: RLE externally rotated, adducted and shortened, right hip tenderness to palpation, exam limited 2/2 pain  NEUROLOGIC: awake, alert, oriented x3, good muscle tone in 4 extremities, no obvious sensory deficits  PSYCHIATRIC: mood is good, affect is congruent, linear and logical thought process  HEME/LYMPH: no palpable supraclavicular nodules, no obvious ecchymosis or petechiae

## 2023-03-07 NOTE — ED PROVIDER NOTE - NS ED ATTENDING STATEMENT MOD
This was a shared visit with the CL. I reviewed and verified the documentation and independently performed the documented:

## 2023-03-07 NOTE — H&P ADULT - NSHPSOCIALHISTORY_GEN_ALL_CORE
Patient lives alone, near her granddaughter  Able to ambulate independently with no assistance at baseline Patient lives alone, near her granddaughter (accompanied to ED by her employee)  Able to ambulate independently with no assistance at baseline

## 2023-03-07 NOTE — PATIENT PROFILE ADULT - NSPROGENSOURCEINFO_GEN_A_NUR
Patient received discharge instruction and tolerated procedure well. All questions answered. ** Prescription drop instruction given. Patient left via self. patient

## 2023-03-07 NOTE — ED PROVIDER NOTE - EKG #1 DATE/TIME
Please call the patient and let her know her iron saturation was on the low side and other iron labs were normal.  I started her on ferrous sulfate a few days ago, so please make sure she is taking it twice a day.  I have already ordered a CBC for 2 weeks to re-check, as well as a GI referral. 07-Mar-2023 10:18

## 2023-03-07 NOTE — H&P ADULT - NSHPREVIEWOFSYSTEMS_GEN_ALL_CORE
CONSTITUTIONAL: denies fever, chills, fatigue, weakness  HEENT: denies blurred vision, sore throat  SKIN: denies new lesions, rash  CARDIOVASCULAR: denies chest pain, chest pressure, palpitations  RESPIRATORY: denies shortness of breath, cough, sputum production  GASTROINTESTINAL: denies nausea, vomiting, diarrhea, abdominal pain  GENITOURINARY: denies dysuria, discharge  NEUROLOGICAL: denies numbness, headache, focal weakness  MUSCULOSKELETAL: admits right hip pain  HEMATOLOGIC: denies gross bleeding, bruising  LYMPHATICS: denies enlarged lymph nodes, extremity swelling  PSYCHIATRIC: denies recent changes in anxiety, depression  ENDOCRINOLOGIC: denies sweating, cold or heat intolerance

## 2023-03-07 NOTE — H&P ADULT - HISTORY OF PRESENT ILLNESS
83 yo F with PMH of HTN, depression, hearing loss and colon CA (2002, s/p chemo) presents with right hip pain after mechanical fall. She slipped on the stairs today and fell on her right hip. She has severe pain even with slight movement, only relief is with not moving the leg. Patient denies pain or injury to other joints, head trauma, LOC. No dizziness, CP, SOB, abdominal pain or other complaints.    In the ED,  BP was elevated to 179/82, rest of VSS. Labs showed WBC 13.64, CMP?. EKG?.  Prelim review of right hip/pelvis x-ray showed a right hip fracture, CXR showed no acute infiltrates.  Received IV Morphine 4 mg x1. Ortho Dr. Rojas was consulted. 83 yo F with PMH of HTN, depression, hearing loss and colon CA (2002, s/p chemo) presents with right hip pain after mechanical fall. She slipped on the stairs today and fell on her right hip. She has severe pain even with slight movement, only relief is with not moving the leg. Patient denies pain or injury to other joints, head trauma, LOC. No dizziness, CP, SOB, abdominal pain or other complaints.    In the ED,  BP was elevated to 179/82, rest of VSS. Labs showed WBC 13.64, CMP pending?. EKG showed sinus inderjit at 58 bpm.  Prelim review of right hip/pelvis x-ray showed a right hip fracture, CXR showed no acute infiltrates.  Received IV Morphine 4 mg x1. Ortho Dr. Rojas was consulted. 81 yo F with PMH of HTN, depression, hearing loss and colon CA (2002, s/p chemo) presents with right hip pain after mechanical fall. She slipped on the stairs today and fell on her right hip. She has severe pain even with slight movement, only relief is with not moving the leg. Patient denies pain or injury to other joints, head trauma, LOC. No dizziness, CP, SOB, abdominal pain or other complaints. Patient denies family history of anesthesia reaction, sudden death, clotting or bleeding disorder. She has tolerated anesthesia in the past.    In the ED,  BP was elevated to 179/82, rest of VSS. Labs showed WBC 13.64. EKG showed sinus inderjit at 58 bpm.  Prelim review of right hip/pelvis x-ray showed a right hip fracture, CXR showed no acute infiltrates.  Received IV Morphine 4 mg x1. Ortho Dr. Rojas was consulted.

## 2023-03-07 NOTE — H&P ADULT - ASSESSMENT
83 yo F with PMH of HTN, depression, hearing loss and colon CA (2002, s/p chemo) presents with right hip pain after mechanical fall, admitted for right hip fracture.    #Right Proximal Hip Fracture  - Admit to medicine  - X-ray right hip/pelvis with right proximal hip fracture  - NPO with IVF  - Pain control with standing Tylenol, IV Morphine prn  - Ortho Dr. Rojas consulted, will f/u recs    #Leukocytosis  - WBC 13.64  - Likely reactive, with no fever or signs/symptoms of infection  - Monitor CBC    #HTN  - BP elevated 2/2 pain  - Continue Amlodipine 5 mg daily  - Monitor hemodynamics    #Depression  - Continue Sertraline 100 mg daily    #VTE PPx  - Hold for OR    #Preoperative Assessment  - RCRI 1 point/class II risk for major surgery   81 yo F with PMH of HTN, depression, hearing loss and colon CA (2002, s/p chemo) presents with right hip pain after mechanical fall, admitted for right hip fracture.    #Right Proximal Femur Fracture  - Admit to medicine  - X-ray right hip/pelvis with right proximal femur fracture  - NPO with IVF  - Pain control with standing Tylenol, IV Morphine prn  - Ortho Dr. Rojas consulted, plan for operative repair likely tonight discussed with surgical PA    #Leukocytosis  - WBC 13.64  - Likely reactive, with no fever or signs/symptoms of infection  - Monitor CBC    #Elevated AST  - Mildly elevated AST 69, ALT 42 wnl  - Monitor CMP    #HTN  - BP elevated 2/2 pain  - Continue Amlodipine 5 mg daily  - Monitor hemodynamics    #Depression  - Continue Sertraline 100 mg daily    #VTE PPx  - Hold for OR    #Preoperative Assessment  - RCRI 1 point/class II risk for major surgery  - EKG with sinus bradycardia; CXR with clear lungs  - Patient does not have any known history of severe valvular disease or MI and is not in decompensated CHF. Baseline functional capacity is > 4 METS. Patient is currently hemodynamically stable. Patient is medically optimized at this time despite the inherent risks of surgery.

## 2023-03-07 NOTE — BRIEF OPERATIVE NOTE - NSICDXBRIEFPOSTOP_GEN_ALL_CORE_FT
POST-OP DIAGNOSIS:  Intertrochanteric fracture of right hip 07-Mar-2023 19:55:12  Edward Donahue P

## 2023-03-07 NOTE — ED ADULT NURSE NOTE - NSIMPLEMENTINTERV_GEN_ALL_ED
Implemented All Fall with Harm Risk Interventions:  [place] to call system. Call bell, personal items and telephone within reach. Instruct patient to call for assistance. Room bathroom lighting operational. Non-slip footwear when patient is off stretcher. Physically safe environment: no spills, clutter or unnecessary equipment. Stretcher in lowest position, wheels locked, appropriate side rails in place. Provide visual cue, wrist band, yellow gown, etc. Monitor gait and stability. Monitor for mental status changes and reorient to person, place, and time. Review medications for side effects contributing to fall risk. Reinforce activity limits and safety measures with patient and family. Provide visual clues: red socks.

## 2023-03-07 NOTE — BRIEF OPERATIVE NOTE - NSICDXBRIEFPROCEDURE_GEN_ALL_CORE_FT
PROCEDURES:  Open reduction and internal fixation (ORIF) of right hip using locking intramedullary jailene 07-Mar-2023 19:54:37  Edward Donahue

## 2023-03-07 NOTE — ED PROVIDER NOTE - MUSCULOSKELETAL MINIMAL EXAM
Right leg externally rotated, abducted, shortened. Slight movement elicits pain. Pain on palpation of lateral right hip. DP/PT 2+/RANGE OF MOTION LIMITED/no muscle tenderness

## 2023-03-07 NOTE — PATIENT PROFILE ADULT - FALL HARM RISK - RISK INTERVENTIONS
Assistance OOB with selected safe patient handling equipment/Assistance with ambulation/Communicate Fall Risk and Risk Factors to all staff, patient, and family/Discuss with provider need for PT consult/Monitor gait and stability/Reinforce activity limits and safety measures with patient and family/Visual Cue: Yellow wristband/Bed in lowest position, wheels locked, appropriate side rails in place/Call bell, personal items and telephone in reach/Instruct patient to call for assistance before getting out of bed or chair/Non-slip footwear when patient is out of bed/[place] to call system/Physically safe environment - no spills, clutter or unnecessary equipment/Purposeful Proactive Rounding/Room/bathroom lighting operational, light cord in reach

## 2023-03-07 NOTE — ED PROVIDER NOTE - CLINICAL SUMMARY MEDICAL DECISION MAKING FREE TEXT BOX
82 year old female with no pmhx presents with HTN BIB EMS s/p slipping on the stairs and falling on her right hip. She notes that as long as she does not move her leg she is not in any pain- however slight movement elicits extreme pain. Her leg is externally rotated, adducted and shortened. Denies knee, ankle, foot pain, hitting her head, LOC, dizziness. Patient lives alone, near her granddaughter and able to ambulate independently with no assistance at baseline.  Right leg externally rotated, abducted, shortened. Slight movement elicits pain. Pain on palpation of lateral right hip. DP/PT 2+   xray showing right hip fx. pre op labs sent   Dr. Rojas called for consult, recommends admit to medicine   d/w Dr. Wynn for admission

## 2023-03-07 NOTE — ED ADULT NURSE NOTE - NSICDXPASTMEDICALHX_GEN_ALL_CORE_FT
PAST MEDICAL HISTORY:  Colon cancer 2002 had chemo.    Hard of hearing     HTN (hypertension) x 3 years, controlled    Volvulus of ascending colon 7/2017  s/p colon surgery

## 2023-03-07 NOTE — H&P ADULT - TIME BILLING
Reviewing chart notes and lab/imaging data, conducting history and physical exam, bedside cardiac monitoring in ER, face to face time counseling the patient, communicating with ER provider and orthopedic ELISHA Crump regarding plan for operative intervention

## 2023-03-07 NOTE — ED ADULT NURSE NOTE - OBJECTIVE STATEMENT
82 yr old female to ED for complaints of fall. Patient states she tripped and fell at home. Patient complaints of right hip pain.

## 2023-03-07 NOTE — ED PROVIDER NOTE - CCCP TRG CHIEF CMPLNT
----- Message from Zoran Obrien sent at 6/5/2020  6:55 AM CDT -----  Contact: Patient   Doctor appointment and lab have been scheduled.  Please link lab orders to the lab appointment.  Date of doctor appointment:    Physical or EP:  7/01/20  Date of lab appointment:    Comments:     Thank you    
Orders are linked to appt.  
hip pain/fall

## 2023-03-07 NOTE — ED PROVIDER NOTE - OBJECTIVE STATEMENT
82 year old female with PMH significant for HTN BIB EMS presents after slipping on the stairs and falling on her right hip. She notes that as long as she does not move her leg she is not in any pain- however slight movement elicits extreme pain. Her leg is externally rotated, adducted and shortened. Denies knee, ankle, foot pain, hitting her head, LOC, dizziness. Patient lives alone, near her granddaughter and able to ambulate independently with no assistance at baseline. 82 year old female with no pmhx presents with HTN BIB EMS s/p slipping on the stairs and falling on her right hip. She notes that as long as she does not move her leg she is not in any pain- however slight movement elicits extreme pain. Her leg is externally rotated, adducted and shortened. Denies knee, ankle, foot pain, hitting her head, LOC, dizziness. Patient lives alone, near her granddaughter and able to ambulate independently with no assistance at baseline.

## 2023-03-08 ENCOUNTER — TRANSCRIPTION ENCOUNTER (OUTPATIENT)
Age: 83
End: 2023-03-08

## 2023-03-08 LAB
ANION GAP SERPL CALC-SCNC: 8 MMOL/L — SIGNIFICANT CHANGE UP (ref 5–17)
BUN SERPL-MCNC: 24 MG/DL — HIGH (ref 7–23)
CALCIUM SERPL-MCNC: 8.1 MG/DL — LOW (ref 8.4–10.5)
CHLORIDE SERPL-SCNC: 103 MMOL/L — SIGNIFICANT CHANGE UP (ref 96–108)
CO2 SERPL-SCNC: 27 MMOL/L — SIGNIFICANT CHANGE UP (ref 22–31)
CREAT SERPL-MCNC: 1.12 MG/DL — SIGNIFICANT CHANGE UP (ref 0.5–1.3)
EGFR: 49 ML/MIN/1.73M2 — LOW
GLUCOSE SERPL-MCNC: 126 MG/DL — HIGH (ref 70–99)
HCT VFR BLD CALC: 28.4 % — LOW (ref 34.5–45)
HGB BLD-MCNC: 9.6 G/DL — LOW (ref 11.5–15.5)
MCHC RBC-ENTMCNC: 33.4 PG — SIGNIFICANT CHANGE UP (ref 27–34)
MCHC RBC-ENTMCNC: 33.8 GM/DL — SIGNIFICANT CHANGE UP (ref 32–36)
MCV RBC AUTO: 99 FL — SIGNIFICANT CHANGE UP (ref 80–100)
NRBC # BLD: 0 /100 WBCS — SIGNIFICANT CHANGE UP (ref 0–0)
PLATELET # BLD AUTO: 172 K/UL — SIGNIFICANT CHANGE UP (ref 150–400)
POTASSIUM SERPL-MCNC: 4.4 MMOL/L — SIGNIFICANT CHANGE UP (ref 3.5–5.3)
POTASSIUM SERPL-SCNC: 4.4 MMOL/L — SIGNIFICANT CHANGE UP (ref 3.5–5.3)
RBC # BLD: 2.87 M/UL — LOW (ref 3.8–5.2)
RBC # FLD: 13.6 % — SIGNIFICANT CHANGE UP (ref 10.3–14.5)
SODIUM SERPL-SCNC: 138 MMOL/L — SIGNIFICANT CHANGE UP (ref 135–145)
WBC # BLD: 9.51 K/UL — SIGNIFICANT CHANGE UP (ref 3.8–10.5)
WBC # FLD AUTO: 9.51 K/UL — SIGNIFICANT CHANGE UP (ref 3.8–10.5)

## 2023-03-08 PROCEDURE — 99233 SBSQ HOSP IP/OBS HIGH 50: CPT

## 2023-03-08 RX ORDER — ENOXAPARIN SODIUM 100 MG/ML
40 INJECTION SUBCUTANEOUS EVERY 24 HOURS
Refills: 0 | Status: DISCONTINUED | OUTPATIENT
Start: 2023-03-08 | End: 2023-03-09

## 2023-03-08 RX ADMIN — ENOXAPARIN SODIUM 40 MILLIGRAM(S): 100 INJECTION SUBCUTANEOUS at 13:04

## 2023-03-08 RX ADMIN — Medication 400 MILLIGRAM(S): at 02:32

## 2023-03-08 RX ADMIN — Medication 975 MILLIGRAM(S): at 15:19

## 2023-03-08 RX ADMIN — Medication 100 MILLIGRAM(S): at 02:32

## 2023-03-08 RX ADMIN — Medication 975 MILLIGRAM(S): at 16:15

## 2023-03-08 RX ADMIN — Medication 975 MILLIGRAM(S): at 22:35

## 2023-03-08 RX ADMIN — AMLODIPINE BESYLATE 5 MILLIGRAM(S): 2.5 TABLET ORAL at 05:58

## 2023-03-08 RX ADMIN — SERTRALINE 100 MILLIGRAM(S): 25 TABLET, FILM COATED ORAL at 12:03

## 2023-03-08 RX ADMIN — Medication 100 MILLIGRAM(S): at 09:12

## 2023-03-08 RX ADMIN — Medication 975 MILLIGRAM(S): at 21:34

## 2023-03-08 RX ADMIN — OXYCODONE HYDROCHLORIDE 10 MILLIGRAM(S): 5 TABLET ORAL at 10:00

## 2023-03-08 RX ADMIN — POLYETHYLENE GLYCOL 3350 17 GRAM(S): 17 POWDER, FOR SOLUTION ORAL at 12:04

## 2023-03-08 RX ADMIN — Medication 975 MILLIGRAM(S): at 05:58

## 2023-03-08 RX ADMIN — SENNA PLUS 2 TABLET(S): 8.6 TABLET ORAL at 21:34

## 2023-03-08 RX ADMIN — OXYCODONE HYDROCHLORIDE 10 MILLIGRAM(S): 5 TABLET ORAL at 09:10

## 2023-03-08 NOTE — OCCUPATIONAL THERAPY INITIAL EVALUATION ADULT - PERTINENT HX OF CURRENT PROBLEM, REHAB EVAL
83 yo F with PMH of HTN, depression, hearing loss and colon CA (2002, s/p chemo) presents with right hip pain after mechanical fall. She slipped on the stairs today and fell on her right hip resulting in right hip fx s/p right hip IM nail.

## 2023-03-08 NOTE — PHYSICAL THERAPY INITIAL EVALUATION ADULT - PERTINENT HX OF CURRENT PROBLEM, REHAB EVAL
83 yo F with PMH of HTN, depression, hearing loss and colon CA (2002, s/p chemo) presents with right hip pain after mechanical fall. She slipped on the stairs today and fell on her right hip. She has severe pain even with slight movement, only relief is with not moving the leg. Patient denies pain or injury to other joints, head trauma, LOC. No dizziness, CP, SOB, abdominal pain or other complaints.

## 2023-03-08 NOTE — DISCHARGE NOTE NURSING/CASE MANAGEMENT/SOCIAL WORK - PATIENT PORTAL LINK FT
You can access the FollowMyHealth Patient Portal offered by [facility] by registering at the following website: [email or web address]. By joining [facility]’s FollowMyHealth portal, you will also be able to view your health information using other applications (apps) compatible with our system.

## 2023-03-08 NOTE — DISCHARGE NOTE NURSING/CASE MANAGEMENT/SOCIAL WORK - NSDCPEFALRISK_GEN_ALL_CORE
For information on Fall & Injury Prevention, visit: [email or web address] OR  [email or web address] OR  https://www.cdc.gov/steadi/patient.html

## 2023-03-08 NOTE — OCCUPATIONAL THERAPY INITIAL EVALUATION ADULT - ADDITIONAL COMMENTS
Pt resides in private home alone, +3STE, +flight to bedroom (+rail), +walk in shower, +GB. Pt reports independence in all self care ADL's/mobility PTA, +driving. Pt has assist w/ laundry/cleaning via house keepers.

## 2023-03-08 NOTE — PHYSICAL THERAPY INITIAL EVALUATION ADULT - ADDITIONAL COMMENTS
pt lives alone in private house, 3 shelly w/rail, 1 flight to br. pt is independent w/ambulation and ADL's, +

## 2023-03-09 ENCOUNTER — INPATIENT (INPATIENT)
Facility: HOSPITAL | Age: 83
LOS: 13 days | Discharge: HOME HEALTH | DRG: 949 | End: 2023-03-23
Attending: PHYSICAL MEDICINE & REHABILITATION | Admitting: REHABILITATION PRACTITIONER
Payer: COMMERCIAL

## 2023-03-09 ENCOUNTER — TRANSCRIPTION ENCOUNTER (OUTPATIENT)
Age: 83
End: 2023-03-09

## 2023-03-09 VITALS
SYSTOLIC BLOOD PRESSURE: 133 MMHG | OXYGEN SATURATION: 94 % | DIASTOLIC BLOOD PRESSURE: 67 MMHG | TEMPERATURE: 100 F | RESPIRATION RATE: 14 BRPM | HEIGHT: 64 IN

## 2023-03-09 DIAGNOSIS — I10 ESSENTIAL (PRIMARY) HYPERTENSION: ICD-10-CM

## 2023-03-09 DIAGNOSIS — S72.146A NONDISPLACED INTERTROCHANTERIC FRACTURE OF UNSPECIFIED FEMUR, INITIAL ENCOUNTER FOR CLOSED FRACTURE: ICD-10-CM
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
## 2023-03-09 DIAGNOSIS — S72.141D DISPLACED INTERTROCHANTERIC FRACTURE OF RIGHT FEMUR, SUBSEQUENT ENCOUNTER FOR CLOSED FRACTURE WITH ROUTINE HEALING: ICD-10-CM

## 2023-03-09 DIAGNOSIS — R55 SYNCOPE AND COLLAPSE: ICD-10-CM

## 2023-03-09 DIAGNOSIS — D62 ACUTE POSTHEMORRHAGIC ANEMIA: ICD-10-CM

## 2023-03-09 DIAGNOSIS — R74.01 ELEVATION OF LEVELS OF LIVER TRANSAMINASE LEVELS: ICD-10-CM

## 2023-03-09 DIAGNOSIS — Z87.891 PERSONAL HISTORY OF NICOTINE DEPENDENCE: ICD-10-CM
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
## 2023-03-09 DIAGNOSIS — H91.90 UNSPECIFIED HEARING LOSS, UNSPECIFIED EAR: ICD-10-CM

## 2023-03-09 DIAGNOSIS — F32.9 MAJOR DEPRESSIVE DISORDER, SINGLE EPISODE, UNSPECIFIED: ICD-10-CM

## 2023-03-09 DIAGNOSIS — Z51.89 ENCOUNTER FOR OTHER SPECIFIED AFTERCARE: ICD-10-CM

## 2023-03-09 DIAGNOSIS — R26.9 UNSPECIFIED ABNORMALITIES OF GAIT AND MOBILITY: ICD-10-CM

## 2023-03-09 DIAGNOSIS — Z90.49 ACQUIRED ABSENCE OF OTHER SPECIFIED PARTS OF DIGESTIVE TRACT: Chronic | ICD-10-CM

## 2023-03-09 DIAGNOSIS — Z90.89 ACQUIRED ABSENCE OF OTHER ORGANS: Chronic | ICD-10-CM

## 2023-03-09 LAB
ALBUMIN SERPL ELPH-MCNC: 2.6 G/DL — LOW (ref 3.3–5)
ALP SERPL-CCNC: 42 U/L — SIGNIFICANT CHANGE UP (ref 40–120)
ALT FLD-CCNC: 24 U/L — SIGNIFICANT CHANGE UP (ref 10–45)
ANION GAP SERPL CALC-SCNC: 8 MMOL/L — SIGNIFICANT CHANGE UP (ref 5–17)
AST SERPL-CCNC: 40 U/L — SIGNIFICANT CHANGE UP (ref 10–40)
BASOPHILS # BLD AUTO: 0.05 K/UL — SIGNIFICANT CHANGE UP (ref 0–0.2)
BASOPHILS NFR BLD AUTO: 0.6 % — SIGNIFICANT CHANGE UP (ref 0–2)
BILIRUB DIRECT SERPL-MCNC: 0.1 MG/DL — SIGNIFICANT CHANGE UP (ref 0–0.3)
BILIRUB INDIRECT FLD-MCNC: 0.2 MG/DL — SIGNIFICANT CHANGE UP (ref 0.2–1)
BILIRUB SERPL-MCNC: 0.3 MG/DL — SIGNIFICANT CHANGE UP (ref 0.2–1.2)
BLD GP AB SCN SERPL QL: SIGNIFICANT CHANGE UP
BUN SERPL-MCNC: 21 MG/DL — SIGNIFICANT CHANGE UP (ref 7–23)
CALCIUM SERPL-MCNC: 7.9 MG/DL — LOW (ref 8.4–10.5)
CHLORIDE SERPL-SCNC: 109 MMOL/L — HIGH (ref 96–108)
CO2 SERPL-SCNC: 28 MMOL/L — SIGNIFICANT CHANGE UP (ref 22–31)
CREAT SERPL-MCNC: 0.88 MG/DL — SIGNIFICANT CHANGE UP (ref 0.5–1.3)
EGFR: 65 ML/MIN/1.73M2 — SIGNIFICANT CHANGE UP
EOSINOPHIL # BLD AUTO: 0.28 K/UL — SIGNIFICANT CHANGE UP (ref 0–0.5)
EOSINOPHIL NFR BLD AUTO: 3.3 % — SIGNIFICANT CHANGE UP (ref 0–6)
GLUCOSE SERPL-MCNC: 103 MG/DL — HIGH (ref 70–99)
HCT VFR BLD CALC: 25.9 % — LOW (ref 34.5–45)
HGB BLD-MCNC: 8.5 G/DL — LOW (ref 11.5–15.5)
IMM GRANULOCYTES NFR BLD AUTO: 0.7 % — SIGNIFICANT CHANGE UP (ref 0–0.9)
LYMPHOCYTES # BLD AUTO: 1.43 K/UL — SIGNIFICANT CHANGE UP (ref 1–3.3)
LYMPHOCYTES # BLD AUTO: 17.1 % — SIGNIFICANT CHANGE UP (ref 13–44)
MCHC RBC-ENTMCNC: 32.8 GM/DL — SIGNIFICANT CHANGE UP (ref 32–36)
MCHC RBC-ENTMCNC: 33.3 PG — SIGNIFICANT CHANGE UP (ref 27–34)
MCV RBC AUTO: 101.6 FL — HIGH (ref 80–100)
MONOCYTES # BLD AUTO: 1.25 K/UL — HIGH (ref 0–0.9)
MONOCYTES NFR BLD AUTO: 15 % — HIGH (ref 2–14)
NEUTROPHILS # BLD AUTO: 5.29 K/UL — SIGNIFICANT CHANGE UP (ref 1.8–7.4)
NEUTROPHILS NFR BLD AUTO: 63.3 % — SIGNIFICANT CHANGE UP (ref 43–77)
NRBC # BLD: 0 /100 WBCS — SIGNIFICANT CHANGE UP (ref 0–0)
PLATELET # BLD AUTO: 142 K/UL — LOW (ref 150–400)
POTASSIUM SERPL-MCNC: 3.8 MMOL/L — SIGNIFICANT CHANGE UP (ref 3.5–5.3)
POTASSIUM SERPL-SCNC: 3.8 MMOL/L — SIGNIFICANT CHANGE UP (ref 3.5–5.3)
PROT SERPL-MCNC: 5.2 G/DL — LOW (ref 6–8.3)
RBC # BLD: 2.55 M/UL — LOW (ref 3.8–5.2)
RBC # FLD: 13.8 % — SIGNIFICANT CHANGE UP (ref 10.3–14.5)
SODIUM SERPL-SCNC: 145 MMOL/L — SIGNIFICANT CHANGE UP (ref 135–145)
WBC # BLD: 8.36 K/UL — SIGNIFICANT CHANGE UP (ref 3.8–10.5)
WBC # FLD AUTO: 8.36 K/UL — SIGNIFICANT CHANGE UP (ref 3.8–10.5)

## 2023-03-09 PROCEDURE — 99221 1ST HOSP IP/OBS SF/LOW 40: CPT

## 2023-03-09 PROCEDURE — 99239 HOSP IP/OBS DSCHRG MGMT >30: CPT

## 2023-03-09 RX ORDER — ENOXAPARIN SODIUM 100 MG/ML
40 INJECTION SUBCUTANEOUS
Qty: 0 | Refills: 0 | DISCHARGE
Start: 2023-03-09

## 2023-03-09 RX ORDER — SERTRALINE 25 MG/1
100 TABLET, FILM COATED ORAL DAILY
Refills: 0 | Status: DISCONTINUED | OUTPATIENT
Start: 2023-03-10 | End: 2023-03-23

## 2023-03-09 RX ORDER — AMLODIPINE BESYLATE 2.5 MG/1
5 TABLET ORAL DAILY
Refills: 0 | Status: DISCONTINUED | OUTPATIENT
Start: 2023-03-10 | End: 2023-03-10

## 2023-03-09 RX ORDER — ACETAMINOPHEN 500 MG
975 TABLET ORAL EVERY 8 HOURS
Refills: 0 | Status: DISCONTINUED | OUTPATIENT
Start: 2023-03-09 | End: 2023-03-13

## 2023-03-09 RX ORDER — OXYCODONE HYDROCHLORIDE 5 MG/1
5 TABLET ORAL EVERY 6 HOURS
Refills: 0 | Status: DISCONTINUED | OUTPATIENT
Start: 2023-03-09 | End: 2023-03-16

## 2023-03-09 RX ORDER — ACETAMINOPHEN 500 MG
3 TABLET ORAL
Qty: 0 | Refills: 0 | DISCHARGE
Start: 2023-03-09

## 2023-03-09 RX ORDER — SENNA PLUS 8.6 MG/1
2 TABLET ORAL AT BEDTIME
Refills: 0 | Status: DISCONTINUED | OUTPATIENT
Start: 2023-03-09 | End: 2023-03-23

## 2023-03-09 RX ORDER — POLYETHYLENE GLYCOL 3350 17 G/17G
17 POWDER, FOR SOLUTION ORAL DAILY
Refills: 0 | Status: DISCONTINUED | OUTPATIENT
Start: 2023-03-10 | End: 2023-03-22

## 2023-03-09 RX ORDER — ENOXAPARIN SODIUM 100 MG/ML
40 INJECTION SUBCUTANEOUS EVERY 24 HOURS
Refills: 0 | Status: DISCONTINUED | OUTPATIENT
Start: 2023-03-10 | End: 2023-03-23

## 2023-03-09 RX ADMIN — Medication 975 MILLIGRAM(S): at 14:00

## 2023-03-09 RX ADMIN — Medication 975 MILLIGRAM(S): at 13:31

## 2023-03-09 RX ADMIN — SERTRALINE 100 MILLIGRAM(S): 25 TABLET, FILM COATED ORAL at 12:47

## 2023-03-09 RX ADMIN — SENNA PLUS 2 TABLET(S): 8.6 TABLET ORAL at 21:33

## 2023-03-09 RX ADMIN — Medication 975 MILLIGRAM(S): at 21:33

## 2023-03-09 RX ADMIN — OXYCODONE HYDROCHLORIDE 5 MILLIGRAM(S): 5 TABLET ORAL at 12:47

## 2023-03-09 RX ADMIN — SODIUM CHLORIDE 100 MILLILITER(S): 9 INJECTION, SOLUTION INTRAVENOUS at 11:37

## 2023-03-09 RX ADMIN — Medication 975 MILLIGRAM(S): at 22:15

## 2023-03-09 RX ADMIN — OXYCODONE HYDROCHLORIDE 5 MILLIGRAM(S): 5 TABLET ORAL at 13:00

## 2023-03-09 RX ADMIN — ENOXAPARIN SODIUM 40 MILLIGRAM(S): 100 INJECTION SUBCUTANEOUS at 14:49

## 2023-03-09 RX ADMIN — POLYETHYLENE GLYCOL 3350 17 GRAM(S): 17 POWDER, FOR SOLUTION ORAL at 11:35

## 2023-03-09 RX ADMIN — AMLODIPINE BESYLATE 5 MILLIGRAM(S): 2.5 TABLET ORAL at 05:52

## 2023-03-09 RX ADMIN — Medication 975 MILLIGRAM(S): at 05:52

## 2023-03-09 NOTE — PROGRESS NOTE ADULT - SUBJECTIVE AND OBJECTIVE BOX
Patient is a 82y old  Female who presents with a chief complaint of Right hip fracture (08 Mar 2023 09:11)      Subjective and overnight events:  Patient seen and examined at bedside. reports surgical pain well controlled. no fever, chills, sob, cp, abd pain.     ALLERGIES:  No Known Allergies    MEDICATIONS  (STANDING):  acetaminophen     Tablet .. 975 milliGRAM(s) Oral every 8 hours  amLODIPine   Tablet 5 milliGRAM(s) Oral daily  influenza  Vaccine (HIGH DOSE) 0.7 milliLiter(s) IntraMuscular once  lactated ringers. 1000 milliLiter(s) (100 mL/Hr) IV Continuous <Continuous>  naloxone Injectable 0.4 milliGRAM(s) IV Push once  polyethylene glycol 3350 17 Gram(s) Oral daily  senna 2 Tablet(s) Oral at bedtime  sertraline 100 milliGRAM(s) Oral daily  sodium chloride 0.9%. 1000 milliLiter(s) (75 mL/Hr) IV Continuous <Continuous>    MEDICATIONS  (PRN):  aluminum hydroxide/magnesium hydroxide/simethicone Suspension 30 milliLiter(s) Oral every 4 hours PRN Dyspepsia  bisacodyl 5 milliGRAM(s) Oral daily PRN Constipation  HYDROmorphone  Injectable 0.5 milliGRAM(s) IV Push every 3 hours PRN Severe Pain (7 - 10)  melatonin 3 milliGRAM(s) Oral at bedtime PRN Insomnia  ondansetron Injectable 4 milliGRAM(s) IV Push every 8 hours PRN Nausea and/or Vomiting  oxyCODONE    IR 5 milliGRAM(s) Oral every 3 hours PRN Mild Pain (1 - 3)  oxyCODONE    IR 10 milliGRAM(s) Oral every 3 hours PRN Moderate Pain (4 - 6)    Vital Signs Last 24 Hrs  T(F): 98.5 (08 Mar 2023 05:34), Max: 99.2 (07 Mar 2023 15:28)  HR: 58 (08 Mar 2023 05:34) (57 - 78)  BP: 120/52 (08 Mar 2023 05:34) (109/48 - 134/56)  RR: 16 (08 Mar 2023 05:34) (12 - 18)  SpO2: 96% (08 Mar 2023 05:34) (96% - 100%)  I&O's Summary    07 Mar 2023 07:01  -  08 Mar 2023 07:00  --------------------------------------------------------  IN: 0 mL / OUT: 400 mL / NET: -400 mL      PHYSICAL EXAM:  General: NAD, A/O x 3  ENT: MMM  Neck: Supple, No JVD  Lungs: Clear to auscultation bilaterally  Cardio: RRR, S1/S2, No murmurs  Abdomen: Soft, Nontender, Nondistended; Bowel sounds present  Extremities: No calf tenderness, No pitting edema. dressing on right hip c/d/i    LABS:                        9.6    9.51  )-----------( 172      ( 08 Mar 2023 06:50 )             28.4     03-08    138  |  103  |  24  ----------------------------<  126  4.4   |  27  |  1.12    Ca    8.1      08 Mar 2023 06:50    TPro  7.2  /  Alb  3.5  /  TBili  0.5  /  DBili  x   /  AST  69  /  ALT  42  /  AlkPhos  57  03-07      PT/INR - ( 07 Mar 2023 12:40 )   PT: 12.9 sec;   INR: 1.11 ratio        COVID-19 PCR: NotDetec (03-07-23 @ 10:50)      RADIOLOGY & ADDITIONAL TESTS:    Care Discussed with Consultants/Other Providers:   
Patient is a 82y old  Female who presents with a chief complaint of Right hip fracture (09 Mar 2023 08:52)      Patient seen and examined at bedside. No overnight events reported. Patient states she is feeling well and denies pain. Patient denies sob, chest pain, nausea, vomiting.    ALLERGIES:  No Known Allergies    MEDICATIONS  (STANDING):  acetaminophen     Tablet .. 975 milliGRAM(s) Oral every 8 hours  amLODIPine   Tablet 5 milliGRAM(s) Oral daily  enoxaparin Injectable 40 milliGRAM(s) SubCutaneous every 24 hours  influenza  Vaccine (HIGH DOSE) 0.7 milliLiter(s) IntraMuscular once  lactated ringers. 1000 milliLiter(s) (100 mL/Hr) IV Continuous <Continuous>  naloxone Injectable 0.4 milliGRAM(s) IV Push once  polyethylene glycol 3350 17 Gram(s) Oral daily  senna 2 Tablet(s) Oral at bedtime  sertraline 100 milliGRAM(s) Oral daily  sodium chloride 0.9%. 1000 milliLiter(s) (75 mL/Hr) IV Continuous <Continuous>    MEDICATIONS  (PRN):  aluminum hydroxide/magnesium hydroxide/simethicone Suspension 30 milliLiter(s) Oral every 4 hours PRN Dyspepsia  bisacodyl 5 milliGRAM(s) Oral daily PRN Constipation  HYDROmorphone  Injectable 0.5 milliGRAM(s) IV Push every 3 hours PRN Severe Pain (7 - 10)  melatonin 3 milliGRAM(s) Oral at bedtime PRN Insomnia  ondansetron Injectable 4 milliGRAM(s) IV Push every 8 hours PRN Nausea and/or Vomiting  oxyCODONE    IR 5 milliGRAM(s) Oral every 3 hours PRN Mild Pain (1 - 3)  oxyCODONE    IR 10 milliGRAM(s) Oral every 3 hours PRN Moderate Pain (4 - 6)    Vital Signs Last 24 Hrs  T(F): 97.6 (09 Mar 2023 05:27), Max: 98.9 (08 Mar 2023 21:19)  HR: 60 (09 Mar 2023 05:27) (60 - 63)  BP: 148/66 (09 Mar 2023 05:27) (114/51 - 148/66)  RR: 18 (09 Mar 2023 05:27) (17 - 18)  SpO2: 95% (09 Mar 2023 05:27) (95% - 98%)  I&O's Summary    08 Mar 2023 07:01  -  09 Mar 2023 07:00  --------------------------------------------------------  IN: 0 mL / OUT: 1000 mL / NET: -1000 mL      PHYSICAL EXAM:  General: NAD, A/O x 3  ENT: No gross hearing impairment, Moist mucous membranes, no thrush  Neck: Supple, No JVD  Lungs: Clear to auscultation bilaterally, good air entry, non-labored breathing  Cardio: RRR, S1/S2, No murmur  Abdomen: Soft, Nontender, Nondistended; Bowel sounds present  Extremities: Dressing on right hip, No calf tenderness, No cyanosis, No pitting edema  Psych: Appropriate mood and affect    LABS:                        8.5    8.36  )-----------( 142      ( 09 Mar 2023 07:49 )             25.9     03-09    145  |  109  |  21  ----------------------------<  103  3.8   |  28  |  0.88    Ca    7.9      09 Mar 2023 07:49    TPro  5.2  /  Alb  2.6  /  TBili  0.3  /  DBili  0.1  /  AST  40  /  ALT  24  /  AlkPhos  42  03-09          PT/INR - ( 07 Mar 2023 12:40 )   PT: 12.9 sec;   INR: 1.11 ratio                                         COVID-19 PCR: NotDetec (03-07-23 @ 10:50)    RADIOLOGY & ADDITIONAL TESTS:    Care Discussed with Consultants/Other Providers:   
Patient is a 82y old  Female who presents with a chief complaint of Right hip fracture (09 Mar 2023 10:20)  pt POD # 2 s/p right gamma nail  No events noted overnight    Patient seen and examined at bedside    ALLERGIES:  No Known Allergies    MEDICATIONS  (STANDING):  acetaminophen     Tablet .. 975 milliGRAM(s) Oral every 8 hours  amLODIPine   Tablet 5 milliGRAM(s) Oral daily  enoxaparin Injectable 40 milliGRAM(s) SubCutaneous every 24 hours  influenza  Vaccine (HIGH DOSE) 0.7 milliLiter(s) IntraMuscular once  lactated ringers. 1000 milliLiter(s) (100 mL/Hr) IV Continuous <Continuous>  naloxone Injectable 0.4 milliGRAM(s) IV Push once  polyethylene glycol 3350 17 Gram(s) Oral daily  senna 2 Tablet(s) Oral at bedtime  sertraline 100 milliGRAM(s) Oral daily  sodium chloride 0.9%. 1000 milliLiter(s) (75 mL/Hr) IV Continuous <Continuous>    MEDICATIONS  (PRN):  aluminum hydroxide/magnesium hydroxide/simethicone Suspension 30 milliLiter(s) Oral every 4 hours PRN Dyspepsia  bisacodyl 5 milliGRAM(s) Oral daily PRN Constipation  HYDROmorphone  Injectable 0.5 milliGRAM(s) IV Push every 3 hours PRN Severe Pain (7 - 10)  melatonin 3 milliGRAM(s) Oral at bedtime PRN Insomnia  ondansetron Injectable 4 milliGRAM(s) IV Push every 8 hours PRN Nausea and/or Vomiting  oxyCODONE    IR 5 milliGRAM(s) Oral every 3 hours PRN Mild Pain (1 - 3)  oxyCODONE    IR 10 milliGRAM(s) Oral every 3 hours PRN Moderate Pain (4 - 6)    Vital Signs Last 24 Hrs  T(F): 99.6 (09 Mar 2023 12:41), Max: 99.6 (09 Mar 2023 12:41)  HR: 76 (09 Mar 2023 12:41) (60 - 76)  BP: 109/96 (09 Mar 2023 12:41) (109/96 - 148/66)  RR: 19 (09 Mar 2023 12:41) (18 - 19)  SpO2: 100% (09 Mar 2023 12:41) (95% - 100%)    I&O's Summary    08 Mar 2023 07:01  -  09 Mar 2023 07:00  --------------------------------------------------------  IN: 0 mL / OUT: 1000 mL / NET: -1000 mL    PHYSICAL EXAM:  General: NAD, A/O x 3  ENT: MMM  Neck: Supple, No JVD  Abdomen: Soft, Nontender, Nondistended  Extremities: LLE - wnl  RLE - thigh is swollen, not tense, Aquacel dressings are c/d/i, knee wnl, calf soft, EHL/FHL 5/5, sensation intact, skin warm and dry     LABS:                        8.5    8.36  )-----------( 142      ( 09 Mar 2023 07:49 )             25.9     03-09    145  |  109  |  21  ----------------------------<  103  3.8   |  28  |  0.88    Ca    7.9      09 Mar 2023 07:49    TPro  5.2  /  Alb  2.6  /  TBili  0.3  /  DBili  0.1  /  AST  40  /  ALT  24  /  AlkPhos  42  03-09      PT/INR - ( 07 Mar 2023 12:40 )   PT: 12.9 sec;   INR: 1.11 ratio         COVID-19 PCR: NotDetec (03-07-23 @ 10:50)      
F/U Note:    82y Female admitted POD #1 from Open reduction and internal fixation (ORIF) of right hip using locking intramedullary jailene        Vital Signs Last 24 Hrs  T(C): 37.2 (08 Mar 2023 21:19), Max: 37.2 (08 Mar 2023 21:19)  T(F): 98.9 (08 Mar 2023 21:19), Max: 98.9 (08 Mar 2023 21:19)  HR: 61 (08 Mar 2023 21:19) (58 - 63)  BP: 126/47 (08 Mar 2023 21:19) (114/51 - 126/47)  BP(mean): 70 (08 Mar 2023 21:19) (70 - 70)  RR: 18 (08 Mar 2023 21:19) (16 - 18)  SpO2: 98% (08 Mar 2023 21:19) (96% - 98%)    Parameters below as of 08 Mar 2023 21:19  Patient On (Oxygen Delivery Method): room air                                9.6    9.51  )-----------( 172      ( 08 Mar 2023 06:50 )             28.4         03-08    138  |  103  |  24<H>  ----------------------------<  126<H>  4.4   |  27  |  1.12    Ca    8.1<L>      08 Mar 2023 06:50    TPro  7.2  /  Alb  3.5  /  TBili  0.5  /  DBili  x   /  AST  69<H>  /  ALT  42  /  AlkPhos  57  03-07        NEURO: no headaches, blurry vision, tremors, depression, anxity  CV: no chest pain, palpitations, murmurs, orthopnea  Resp: no shortness of breath, cough, wheeze, sputum production  GI: no stomach pain,nausea, vomitting, flatulence, hematemesis, hematochezia  PV: no swelling of extremities, no hair loss, no coolness to extremities  ENDO: no polydypsia, polyphagia, polyuria, weight loss, night sweats          NEURO: awake and alert  CV: (+) S1/S2, rrr, no mrg  RESP: CTA b/l  GI: soft, non tender            
POD#1 ORIF/IM nail  R ITT fx    Pt sitting up in bed eating breakfast.  No acute events overnight per RN. No new complaints tolerating diet pain controlled no SOB no CP  Vital Signs Last 24 Hrs  T(C): 36.9 (08 Mar 2023 05:34), Max: 37.3 (07 Mar 2023 15:28)  T(F): 98.5 (08 Mar 2023 05:34), Max: 99.2 (07 Mar 2023 15:28)  HR: 58 (08 Mar 2023 05:34) (57 - 78)  BP: 120/52 (08 Mar 2023 05:34) (109/48 - 179/82)  BP(mean): --  RR: 16 (08 Mar 2023 05:34) (12 - 18)  SpO2: 96% (08 Mar 2023 05:34) (96% - 100%)    Parameters below as of 08 Mar 2023 05:34  Patient On (Oxygen Delivery Method): room air    PE: Rhip: aquacells in place scant drainage on dressing. thigh soft   RLE: NVI sensory intact +DP pulses gd cap refill moves toes/feet warm                          9.6    9.51  )-----------( 172      ( 08 Mar 2023 06:50 )             28.4   03-08    138  |  103  |  24<H>  ----------------------------<  126<H>  4.4   |  27  |  1.12    Ca    8.1<L>      08 Mar 2023 06:50    TPro  7.2  /  Alb  3.5  /  TBili  0.5  /  DBili  x   /  AST  69<H>  /  ALT  42  /  AlkPhos  57  03-07   I&O's Detail    07 Mar 2023 07:01  -  08 Mar 2023 07:00  --------------------------------------------------------  IN:  Total IN: 0 mL    OUT:    Intermittent Catheterization - Urethral (mL): 400 mL  Total OUT: 400 mL    Total NET: -400 mL

## 2023-03-09 NOTE — DISCHARGE NOTE PROVIDER - ATTENDING DISCHARGE PHYSICAL EXAMINATION:
General: NAD, A/O x 3  ENT: No gross hearing impairment, Moist mucous membranes, no thrush  Neck: Supple, No JVD  Lungs: Clear to auscultation bilaterally, good air entry, non-labored breathing  Cardio: RRR, S1/S2, No murmur  Abdomen: Soft, Nontender, Nondistended; Bowel sounds present  Extremities: Dressing on right hip, No calf tenderness, No cyanosis, No pitting edema  Psych: Appropriate mood and affect

## 2023-03-09 NOTE — DISCHARGE NOTE PROVIDER - NSDCACTIVITY_GEN_ALL_CORE
RLE weight bearing as tolerated RLE weight bearing as tolerated/Do not drive or operate machinery/No heavy lifting/straining

## 2023-03-09 NOTE — H&P ADULT - NSHPREVIEWOFSYSTEMS_GEN_ALL_CORE
REVIEW OF SYSTEMS: No chest pain, shortness of breath, nausea, vomiting or diarrhea. REVIEW OF SYSTEMS  Constitutional: No fever, No Chills, No fatigue  HEENT: No eye pain, No visual disturbances, No difficulty hearing  Pulm: No cough,  No shortness of breath  Cardio: No chest pain, No palpitations  GI:  No abdominal pain, No nausea, No vomiting, No diarrhea, No constipation  : No dysuria, No frequency, No hematuria  Neuro: No headaches, No memory loss, + loss of strength, no numbness, No tremors  Skin: No itching, No rashes, No lesions   Endo: No temperature intolerance  MSK: + joint pain- right hip, No joint swelling, No muscle pain, No Neck pain,  No back pain  Psych:  No depression, No anxiety

## 2023-03-09 NOTE — H&P ADULT - NS_MD_PANP_GEN_ALL_CORE
Attending and PA/NP shared services statement (NON-critical care): Cheek-To-Nose Interpolation Flap Text: A decision was made to reconstruct the defect utilizing an interpolation axial flap and a staged reconstruction.  A telfa template was made of the defect.  This telfa template was then used to outline the Cheek-To-Nose Interpolation flap.  The donor area for the pedicle flap was then injected with anesthesia.  The flap was excised through the skin and subcutaneous tissue down to the layer of the underlying musculature.  The interpolation flap was carefully excised within this deep plane to maintain its blood supply.  The edges of the donor site were undermined.   The donor site was closed in a primary fashion.  The pedicle was then rotated into position and sutured.  Once the tube was sutured into place, adequate blood supply was confirmed with blanching and refill.  The pedicle was then wrapped with xeroform gauze and dressed appropriately with a telfa and gauze bandage to ensure continued blood supply and protect the attached pedicle.

## 2023-03-09 NOTE — H&P ADULT - NSHPLABSRESULTS_GEN_ALL_CORE
ACC: [id number] EXAM:  XR HIP WITH PELV 1V RT   ORDERED BY: JONATHAN RAMÍREZ     ACC: [id number] EXAM:  XR CHEST PORTABLE URGENT 1V   ORDERED BY: JONATHAN RAMÍREZ     PROCEDURE DATE:  03/07/2023          INTERPRETATION:  INDICATION: Trauma    AP chest    COMPARISON: 4/15/2013    Overlying EKG leads, wires and other metallic artifacts.    FINDINGS:  Heart/Vascular: The heart size, mediastinum, hilum and aorta are within   normal limits for projection.  Pulmonary: Midline trachea. There is nofocal infiltrate, congestion or   effusion.  Bones: There is no fracture.  Lines and catheter: None    AP pelvis and right hip. 2 views    FINDINGS: There is a comminuted, displaced and angulated fracture of the   proximal right femoral shaft extending into the lesser trochanter. There   is moderate soft tissue swelling. There is no dislocation. The bilateral   sacroiliac joints and pubic symphysis are intact.    Impression:    No acute pulmonary disease.    Right proximal femur fracture. Obtain CT if indicated.    --- End of Report ---             GIUSEPPE MALDONADO DO; Attending Radiologist  This document has been electronically signed. Mar  7 2023 12:15PM

## 2023-03-09 NOTE — PATIENT PROFILE ADULT - FUNCTIONAL ASSESSMENT - BASIC MOBILITY 6.
2-calculated by average/Not able to assess (calculate score using [facility] averaging method)

## 2023-03-09 NOTE — H&P ADULT - HISTORY OF PRESENT ILLNESS
81 yo F with PMH of HTN, depression, hearing loss and colon CA (2002, s/p chemo) presents to ED [facility] on 3/7 with right hip pain after mechanical fall.  Pt w/ severe pain on movement. BP elevated to 179/82, WBC 13.64. EKG showed sinus inderjit at 58 bpm, right hip/pelvis x-ray showed a right hip fracture, CXR showed no acute infiltrates. Ortho Dr. Rojas was consulted. S/p Open reduction and internal fixation (ORIF) of right hip using locking intramedullary jailene was performed without complications. Patient was evaluated by physical therapy and occupational therapy and acute rehab was recommended. Patient's h/h downtrended due to postoperative blood loss- asymptomatic. Patient should f/u with Dr. Rojas and pcp outpatient. Cleared for dc on 3/9. WBAT.    83 yo F with PMH of HTN, depression, hearing loss and colon CA (2002, s/p chemo) presents to ED at [facility] on 3/7/23 with right hip pain after mechanical fall at home while watering plants.  No prior h/o falls or osteoporosis.  Pt w/ severe pain on movement.  BP elevated to 179/82, WBC 13.64. EKG showed sinus inderjit at 58 bpm, right hip/pelvis x-ray showed a comminuted, displaced, angulated right IT hip fracture, CXR showed no acute infiltrates. Ortho was consulted and pt underwent ORIF of right hip fx using locking intramedullary jailene later on 3/7 by Dr. Rojas without complications.  Postop course notable with some anemia (hgb 14.4-->9.6), but clinically stable and otherwise unremarkable.  Cleared for WBAT R Eder and LMWH VTE prophylaxis.  Leucocytosis resolved.  Patient was evaluated by physical therapy and occupational therapy and acute rehab was recommended.  Patient should f/u with Dr. Rojas and pcp outpatient. Cleared for transfer to the rehab unit on 3/9.

## 2023-03-09 NOTE — GOALS OF CARE CONVERSATION - ADVANCED CARE PLANNING - CONVERSATION DETAILS
Met with patient at bedside. She is A&Ox3. Pt. here from home with right femur neck fx.  She also has hx. remote colon ca., HTN, [place]. I asked patient about designating a HCP. She stated she had designated her daughter, but form is at home and she will ask daughter to bring it. Pt. declined to sign a new form.

## 2023-03-09 NOTE — CONSULT NOTE ADULT - SUBJECTIVE AND OBJECTIVE BOX
Patient is a 82y old  Female who presents with a chief complaint of Right hip fracture (08 Mar 2023 19:48)      HPI:  81 yo F with PMH of HTN, depression, hearing loss and colon CA (2002, s/p chemo) presents with right hip pain after mechanical fall. She slipped on the stairsand fell on her right hip.   POD #2 from Open reduction and internal fixation (ORIF) of right hip using locking intramedullary jailene  pain controlled with pain meds   tolerating therapy       REVIEW OF SYSTEMS: No chest pain, shortness of breath, nausea, vomiting or diarhea.      PAST MEDICAL & SURGICAL HISTORY  HTN (hypertension)    Colon cancer    Volvulus of ascending colon    Hard of hearing    S/P colon resection    S/P tonsillectomy and adenoidectomy        SOCIAL HISTORY  Smoking - Denied, EtOH - Denied, Drugs - Denied    FUNCTIONAL HISTORY:   Lives with family , 3 shelly , + stairs inside   Independent PTA     CURRENT FUNCTIONAL STATUS: min a        FAMILY HISTORY   FH: colon cancer (Grandparent)        RECENT LABS/IMAGING  CBC Full  -  ( 09 Mar 2023 07:49 )  WBC Count : 8.36 K/uL  RBC Count : 2.55 M/uL  Hemoglobin : 8.5 g/dL  Hematocrit : 25.9 %  Platelet Count - Automated : 142 K/uL  Mean Cell Volume : 101.6 fl  Mean Cell Hemoglobin : 33.3 pg  Mean Cell Hemoglobin Concentration : 32.8 gm/dL  Auto Neutrophil # : 5.29 K/uL  Auto Lymphocyte # : 1.43 K/uL  Auto Monocyte # : 1.25 K/uL  Auto Eosinophil # : 0.28 K/uL  Auto Basophil # : 0.05 K/uL  Auto Neutrophil % : 63.3 %  Auto Lymphocyte % : 17.1 %  Auto Monocyte % : 15.0 %  Auto Eosinophil % : 3.3 %  Auto Basophil % : 0.6 %    03-09    145  |  109<H>  |  21  ----------------------------<  103<H>  3.8   |  28  |  0.88    Ca    7.9<L>      09 Mar 2023 07:49    TPro  7.2  /  Alb  3.5  /  TBili  0.5  /  DBili  x   /  AST  69<H>  /  ALT  42  /  AlkPhos  57  03-07        VITALS  T(C): 36.4 (03-09-23 @ 05:27), Max: 37.2 (03-08-23 @ 21:19)  HR: 60 (03-09-23 @ 05:27) (60 - 63)  BP: 148/66 (03-09-23 @ 05:27) (114/51 - 148/66)  RR: 18 (03-09-23 @ 05:27) (17 - 18)  SpO2: 95% (03-09-23 @ 05:27) (95% - 98%)  Wt(kg): --    ALLERGIES  No Known Allergies      MEDICATIONS   acetaminophen     Tablet .. 975 milliGRAM(s) Oral every 8 hours  aluminum hydroxide/magnesium hydroxide/simethicone Suspension 30 milliLiter(s) Oral every 4 hours PRN  amLODIPine   Tablet 5 milliGRAM(s) Oral daily  bisacodyl 5 milliGRAM(s) Oral daily PRN  enoxaparin Injectable 40 milliGRAM(s) SubCutaneous every 24 hours  HYDROmorphone  Injectable 0.5 milliGRAM(s) IV Push every 3 hours PRN  influenza  Vaccine (HIGH DOSE) 0.7 milliLiter(s) IntraMuscular once  lactated ringers. 1000 milliLiter(s) IV Continuous <Continuous>  melatonin 3 milliGRAM(s) Oral at bedtime PRN  naloxone Injectable 0.4 milliGRAM(s) IV Push once  ondansetron Injectable 4 milliGRAM(s) IV Push every 8 hours PRN  oxyCODONE    IR 5 milliGRAM(s) Oral every 3 hours PRN  oxyCODONE    IR 10 milliGRAM(s) Oral every 3 hours PRN  polyethylene glycol 3350 17 Gram(s) Oral daily  senna 2 Tablet(s) Oral at bedtime  sertraline 100 milliGRAM(s) Oral daily  sodium chloride 0.9%. 1000 milliLiter(s) IV Continuous <Continuous>      ----------------------------------------------------------------------------------------  PHYSICAL EXAM  Constitutional - NAD, Comfortable  HEENT - NCAT, EOMI  Neck - Supple, No limited ROM  Chest - CTA bilaterally, No wheeze, No rhonchi, No crackles  Cardiovascular - RRR, S1S2, No murmurs  Abdomen - BS+, Soft, NTND  Extremities -  No calf tenderness ,_+ edema   incision with aquacel scant drainage   Neurologic Exam -                    Cognitive - Awake, Alert, AAO to self, place, date, year, situation     Communication - Fluent, No dysarthria, no aphasia     Cranial Nerves - CN 2-12 intact     Motor - No focal deficits except RLE due to pain                        Sensory - Intact to LT     Reflexes - DTR Intact, No primitive reflexive     Balance - WNL Static  Psychiatric - Mood stable, Affect WNL  
81 yo F with PMH of HTN, depression, hearing loss and colon CA (2002, s/p chemo) presents with right hip pain after mechanical fall. She slipped on the stairs today and fell on her right hip. She has severe pain even with slight movement, only relief is with not moving the leg. Patient denies pain or injury to other joints, head trauma, LOC. No dizziness, CP, SOB, abdominal pain or other complaints. Patient denies family history of anesthesia reaction, sudden death, clotting or bleeding disorder. She has tolerated anesthesia in the past.    Pt was found to have right, proximal femur fx, ortho was consulted for surgical intervention    PAST MEDICAL & SURGICAL HISTORY:  HTN (hypertension)  x 3 years, controlled      Colon cancer  2002 had chemo.      Volvulus of ascending colon  7/2017  s/p colon surgery      Hard of hearing      S/P colon resection      S/P tonsillectomy and adenoidectomy      Home Medications:  amLODIPine 5 mg oral tablet: 1 tab(s) orally once a day (07 Mar 2023 12:18)  sertraline 100 mg oral tablet: 1 tab(s) orally once a day (07 Mar 2023 12:18)    Allergies    No Known Allergies    Intolerances    SHx - lives in a private house    [facility] [place]    Vital Signs Last 24 Hrs  T(C): 37.3 (07 Mar 2023 15:28), Max: 37.3 (07 Mar 2023 15:28)  T(F): 99.2 (07 Mar 2023 15:28), Max: 99.2 (07 Mar 2023 15:28)  HR: 60 (07 Mar 2023 15:28) (57 - 69)  BP: 132/63 (07 Mar 2023 15:28) (109/48 - 179/82)  RR: 16 (07 Mar 2023 15:28) (14 - 16)  SpO2: 97% (07 Mar 2023 15:28) (97% - 99%)    Parameters below as of 07 Mar 2023 15:28  Patient On (Oxygen Delivery Method): room air    pt seen and examined at bedside    GENERAL: elderly female, appears in no acute distress, appropriate  EYES: sclera clear, no exudates  ENMT: oropharynx clear without erythema, no exudates, moist mucous membranes  NECK: supple, soft  GASTROINTESTINAL: abdomen is soft, nontender, nondistended  MUSCULOSKELETAL: RLE externally rotated, abducted and shortened, right thigh swelling, tender to palpation, exam limited 2/2 pain, skin warm and dry, EHL/FHL 5/5  LLW wnl  NEUROLOGIC: awake, alert, oriented x3, good muscle tone in 4 extremities, no obvious sensory deficits  PSYCHIATRIC: mood is good, affect is congruent, linear and logical thought process  HEME/LYMPH: no palpable supraclavicular nodules, no obvious ecchymosis or petechiae                          14.4   13.64 )-----------( 201      ( 07 Mar 2023 10:50 )             43.0   03-07    139  |  103  |  18  ----------------------------<  105<H>  5.0   |  25  |  0.68    Ca    8.7      07 Mar 2023 12:40    TPro  7.2  /  Alb  3.5  /  TBili  0.5  /  DBili  x   /  AST  69<H>  /  ALT  42  /  AlkPhos  57  03-07    PT/INR - ( 07 Mar 2023 12:40 )   PT: 12.9 sec;   INR: 1.11 ratio         < from: Xray Hip w/ Pelvis 1 View, Right (03.07.23 @ 11:11) >  FINDINGS: There is a comminuted, displaced and angulated fracture of the   proximal right femoral shaft extending into the lesser trochanter. There   is moderate soft tissue swelling. There is no dislocation. The bilateral   sacroiliac joints and pubic symphysis are intact.    Impression:    No acute pulmonary disease.    Right proximal femur fracture. Obtain CT if indicated.    < end of copied text >    < from: Xray Chest 1 View- PORTABLE-Urgent (03.07.23 @ 11:11) >  Heart/Vascular: The heart size, mediastinum, hilum and aorta are within   normal limits for projection.  Pulmonary: Midline trachea. There is nofocal infiltrate, congestion or   effusion.  Bones: There is no fracture.  Lines and catheter: None    < end of copied text >

## 2023-03-09 NOTE — DISCHARGE NOTE PROVIDER - CARE PROVIDERS DIRECT ADDRESSES
[email or web address]

## 2023-03-09 NOTE — DISCHARGE NOTE PROVIDER - NSDCMRMEDTOKEN_GEN_ALL_CORE_FT
acetaminophen 325 mg oral tablet: 3 tab(s) orally every 8 hours  amLODIPine 5 mg oral tablet: 1 tab(s) orally once a day  enoxaparin: 40 milligram(s) subcutaneous once a day  sertraline 100 mg oral tablet: 1 tab(s) orally once a day

## 2023-03-09 NOTE — DISCHARGE NOTE PROVIDER - PROVIDER TOKENS
PROVIDER:[TOKEN:[[id number]]],PROVIDER:[TOKEN:[[id number]]] PROVIDER:[TOKEN:[[id number]],FOLLOWUP:[1 week],ESTABLISHEDPATIENT:[T]],PROVIDER:[TOKEN:[[id number]],FOLLOWUP:[1 week],ESTABLISHEDPATIENT:[T]]

## 2023-03-09 NOTE — H&P ADULT - ASSESSMENT
TIAGO RAYMUNDO is a 81yo Female with right hip fx s/p repair, now with decreased functional mobility, gait instability and ADL impairments.    COMORBIDITES/ACTIVE MEDICAL ISSUES     Gait Instability, ADL impairments and Functional impairments: start Comprehensive Rehab Program of PT/OT     #Right Proximal Femur Fracture  - s/p ORIF IM nailing   - Pain control   - start PT/OT- acute rehab  - RLE WBAT    #acute blood loss anemia likely post op anemia  - baseline hgb 14  - pt asymptomatic today  - monitor cbc    #Leukocytosis-resolved  - Likely reactive  - improved  - Monitor CBC    #Elevated AST-resolved  - Mildly elevated AST 69, ALT 42 wnl  - Monitor CMP    #HTN  - Continue Amlodipine 5 mg daily    #Depression  - Continue Sertraline 100 mg daily    #VTE PPx  - lovenox       Pain Mgmt - Tylenol PRN  GI/Bowel Mgmt - Senna,  Miralax PRN  /Bladder Mgmt - Voiding independently, PVR      Precautions / PROPHYLAXIS:   - Falls, Cardiac  - Ortho: Weight bearing status: WBAT   - Lungs: Aspiration, Incentive Spirometer   - Pressure injury/Skin: Turn Q2hrs while in bed, OOB to Chair, PT/OT    - DVT: Lovenox     MEDICAL PROGNOSIS: GOOD            REHAB POTENTIAL: GOOD             ESTIMATED DISPOSITION: HOME WITH HOME CARE            ELOS: 10-14 Days   EXPECTED THERAPY:     P.T. 1hr/day       O.T. 1hr/day      S.L.P. 1hr/day     P&O Unnecessary     EXP FREQUENCY: 5 days per 7 day period     PRESCREEN COMPARISION:   I have reviewed the prescreen information and I have found no relevant changes between the preadmission screening and my post admission evaluation     RATIONALE FOR INPATIENT ADMISSION - Patient demonstrates the following: (check all that apply)  [X] Medically appropriate for rehabilitation admission  [X] Has attainable rehab goals with an appropriate initial discharge plan  [X] Has rehabilitation potential (expected to make a significant improvement within a reasonable period of time)   [X] Requires close medical management by a rehab physician, rehab nursing care, Hospitalist and comprehensive interdisciplinary team (including PT, OT, & or SLP, Prosthetics and Orthotics)     TIAGO RAYMUNDO is a 81yo Female with right hip fx s/p repair, now with decreased functional mobility, gait instability and ADL impairments.    COMORBIDITES/ACTIVE MEDICAL ISSUES     Gait Instability, ADL impairments and Functional impairments: start Comprehensive Rehab Program of PT/OT     #Right Proximal Femur Fracture  - s/p ORIF IM nailing   - Pain control   - start PT/OT- acute rehab  - RLE WBAT    #acute blood loss anemia likely post op anemia  - baseline hgb 14  - pt asymptomatic today  - monitor cbc    #Leukocytosis-resolved  - Likely reactive  - improved  - Monitor CBC    #Elevated AST-resolved  - Mildly elevated AST 69, ALT 42 wnl  - Monitor CMP    #HTN  - Continue Amlodipine 5 mg daily    #Depression  - Continue Sertraline 100 mg daily  #skin  - Right hip incision x 3 with aquacel dressing. middle dressing slightly saturated.    #VTE PPx  - Lovenox     Pain Mgmt - Tylenol PRN  GI/Bowel Mgmt - Senna,  Miralax PRN  /Bladder Mgmt - Voiding independently, PVR      Precautions / PROPHYLAXIS:   - Falls, Cardiac  - Ortho: Weight bearing status: WBAT  RLE  - Lungs: Aspiration, Incentive Spirometer   - Pressure injury/Skin: Turn Q2hrs while in bed, OOB to Chair, PT/OT    - DVT: Lovenox     MEDICAL PROGNOSIS: GOOD            REHAB POTENTIAL: GOOD             ESTIMATED DISPOSITION: HOME WITH HOME CARE            ELOS: 10-14 Days   EXPECTED THERAPY:     P.T. 1hr/day       O.T. 1hr/day      S.L.P. 0hr/day     P&O Unnecessary     EXP FREQUENCY: 5 days per 7 day period     PRESCREEN COMPARISION:   I have reviewed the prescreen information and I have found no relevant changes between the preadmission screening and my post admission evaluation     RATIONALE FOR INPATIENT ADMISSION - Patient demonstrates the following: (check all that apply)  [X] Medically appropriate for rehabilitation admission  [X] Has attainable rehab goals with an appropriate initial discharge plan  [X] Has rehabilitation potential (expected to make a significant improvement within a reasonable period of time)   [X] Requires close medical management by a rehab physician, rehab nursing care, Hospitalist and comprehensive interdisciplinary team (including PT, OT, & or SLP, Prosthetics and Orthotics)

## 2023-03-09 NOTE — CONSULT NOTE ADULT - ASSESSMENT
83 yo admitted s/p fall    pt found to have right proximal femur fx, requiring surgical intervention    pt cleared from medical standpoint    plan  - NPO  - OR for Right Gamma nail tonight with Dr. Rojas     case d/w Dr. Bolivar       
1. PT- bed mobility,transfers, gait and balance training  2. OT- ADL'S  3.hip fracture - pain control, DVT px   4. Patient would benefit from acute rehab, needs a multidisciplinary team including PT, OTCan tolerate 3 hours of therapy a day. ELOS 10 days, goals for supervision   Will follow.

## 2023-03-09 NOTE — H&P ADULT - NSHPSOCIALHISTORY_GEN_ALL_CORE
SOCIAL HISTORY  Smoking - Denied, EtOH - Denied, Drugs - Denied    FUNCTIONAL HISTORY:   Lives with family , 3 shelly , + stairs inside   Independent PTA     CURRENT FUNCTIONAL STATUS: min a SOCIAL HISTORY  Smoking - former smoker. quit in 1990.   EtOH - social weekend drinker   Drugs - Denied    FUNCTIONAL HISTORY:   Lives with family , 3 shelly , + stairs inside   Independent PTA     CURRENT FUNCTIONAL STATUS: min a SOCIAL HISTORY  Smoking - former smoker. quit in 1990.   EtOH - social weekend drinker   Drugs - Denied    FUNCTIONAL HISTORY:   Lives alone, 3 shelly , + stairs inside   Independent PTA  without assistive device, drives, shops    CURRENT FUNCTIONAL STATUS: min a

## 2023-03-09 NOTE — DISCHARGE NOTE PROVIDER - NSDCCPCAREPLAN_GEN_ALL_CORE_FT
PRINCIPAL DISCHARGE DIAGNOSIS  Diagnosis: Hip fracture, right  Assessment and Plan of Treatment: You were admitted for right hip fracture   You were treated with an open reduction and internal fixation of right hip using intramedullary jailene  You were evaluated by physical therapy and occupational therapy  You are accepted to acute rehab

## 2023-03-09 NOTE — PATIENT PROFILE ADULT - FALL HARM RISK - RISK INTERVENTIONS
Assistance OOB with selected safe patient handling equipment/Assistance with ambulation/Communicate Fall Risk and Risk Factors to all staff, patient, and family/Discuss with provider need for PT consult/Monitor gait and stability/Reinforce activity limits and safety measures with patient and family/Visual Cue: Yellow wristband/Bed in lowest position, wheels locked, appropriate side rails in place/Call bell, personal items and telephone in reach/Instruct patient to call for assistance before getting out of bed or chair/Non-slip footwear when patient is out of bed/[place] to call system/Physically safe environment - no spills, clutter or unnecessary equipment/Purposeful Proactive Rounding/Room/bathroom lighting operational, light cord in reach

## 2023-03-09 NOTE — PROGRESS NOTE ADULT - ASSESSMENT
81 yo F with PMH of HTN, depression, hearing loss and colon CA (2002, s/p chemo) presents with right hip pain after mechanical fall, admitted for right hip fracture.    #Right Proximal Femur Fracture  - s/p ORIF IM nailing  - Pain control   - seen by PT, rec acute rehab  - RLE WBAT    #acute blood loss anemia likely post op anemia  - baseline hgb 14  - hgb 9.6 today  - keep type and screen active. pt asymptomatic today  - monitor cbc    #Leukocytosis  - Likely reactive  - improved  - Monitor CBC    #Elevated AST  - Mildly elevated AST 69, ALT 42 wnl  - Monitor CMP    #HTN  - Continue Amlodipine 5 mg daily    #Depression  - Continue Sertraline 100 mg daily    #VTE PPx  - lovenox     
81 yo F with PMH of HTN, depression, hearing loss and colon CA (2002, s/p chemo) presents with right hip pain after mechanical fall, admitted for right hip fracture.    #Right Proximal Femur Fracture  - s/p ORIF IM nailing POD#2  - Pain control   - seen by PT/OT, rec acute rehab  - Accepted to acute rehab  - RLE WBAT    #acute blood loss anemia likely post op anemia  - baseline hgb 14  - hgb 8.5 today  - keep type and screen active. pt asymptomatic today  - monitor cbc    #Leukocytosis-resolved  - Likely reactive  - improved  - Monitor CBC    #Elevated AST-resolved  - Mildly elevated AST 69, ALT 42 wnl  - Monitor CMP    #HTN  - Continue Amlodipine 5 mg daily    #Depression  - Continue Sertraline 100 mg daily    #VTE PPx  - lovenox     updated patient's boss (emergency contact) on plan of care
POD#1 ORIF R ITT FX  -pain control  -PT eval WBAT RLE  -DVT PRophylaxis  -reg diet  -d/c planning to rehab
PLAN:    -serial exams, any changes in exam to be escelated to surgical team immedietly  -pain control  -finish post op anbx  -IVFs until taking adequate PO  -advance diet per surgical team  -dressing changes per surgical team  -DVT prophylaxis  -AM labs 
Patient is a 82y old  Female who presents with a chief complaint of Right hip fracture (09 Mar 2023 10:20)  pt POD # 2 s/p right gamma nail  No events noted overnight    Hg 8.5, acute blood loss anemia    pt doing well from orthopaedic standpoint    plan  - check cbc in am 3/10, make sure Hg > 7  - DVT ppx  - WBAT RLE  - analgesia prn  - PT  - pt going to acute rehab

## 2023-03-09 NOTE — DISCHARGE NOTE PROVIDER - HOSPITAL COURSE
Hospital Course  HPI:  83 yo F with PMH of HTN, depression, hearing loss and colon CA (2002, s/p chemo) presents with right hip pain after mechanical fall. She slipped on the stairs today and fell on her right hip. She has severe pain even with slight movement, only relief is with not moving the leg. Patient denies pain or injury to other joints, head trauma, LOC. No dizziness, CP, SOB, abdominal pain or other complaints. Patient denies family history of anesthesia reaction, sudden death, clotting or bleeding disorder. She has tolerated anesthesia in the past.  In the ED, BP was elevated to 179/82, rest of VSS. Labs showed WBC 13.64. EKG showed sinus inderjit at 58 bpm.  Prelim review of right hip/pelvis x-ray showed a right hip fracture, CXR showed no acute infiltrates.  Received IV Morphine 4 mg x1. Ortho Dr. Rojas was consulted. (07 Mar 2023 11:22)    Upon admission, patient was evaluated by surgery. An Open reduction and internal fixation (ORIF) of right hip using locking intramedullary jailene was performed without complications. Patient was evaluated by physical therapy and occupational therapy and acute rehab was recommended. Patient's h/h downtrended due to postoperative blood loss. Patient is asymptomatic. H/H should continue to be followed while in acute rehab. Patient should f/u with Dr. Rojas and pcp outpatient.       You were admitted for right hip fracture   You were treated with an open reduction and internal fixation of right hip using intramedullary jailene  You were evaluated by physical therapy and occupational therapy  You are accepted to acute rehab    You will need to follow up with your primary care physician.    Discharging Provider:  ELISHA Hubbard  Contact Info: [phone] - Please call with any questions or concerns.    Outpatient Provider: Dr. Jennings-notified

## 2023-03-09 NOTE — DISCHARGE NOTE PROVIDER - CARE PROVIDER_API CALL
Giancarlo Rojas)  Orthopaedic Sports Medicine; Orthopaedic Surgery  [address], [address]  [place] [address]  Phone: [phone]  Fax: [phone]  Follow Up Time:     Victor Hugo Dill)  Internal Medicine  [address], [address]  [place], [place] [id number]  Phone: [phone]  Fax: [phone]  Follow Up Time:    Giancarlo Rojas)  Orthopaedic Sports Medicine; Orthopaedic Surgery  [address], [address]  [place] [address]  Phone: [phone]  Fax: [phone]  Established Patient  Follow Up Time: 1 week    Victor Hugo Dill)  Internal Medicine  [address], [address]  [place], [place] [id number]  Phone: [phone]  Fax: [phone]  Established Patient  Follow Up Time: 1 week

## 2023-03-09 NOTE — H&P ADULT - NSHPPHYSICALEXAM_GEN_ALL_CORE
General: NAD, Resting Comfortable,                                  HEENT: NC/AT, EOM I, PERRLA, Normal Conjunctivae  Cardio: RRR, Normal S1-S2, No M/G/R                              Pulm: No Respiratory Distress,  Lungs CTAB                        Abdomen: ND/NT, Soft, BS+                                                MSK: No joint swelling, Full ROM                                         Ext: No C/C/E, Pulses 2+ throughout, No calf tenderness    Skin:  all skin intact                                                                 Wounds: none  Decubitus Ulcers: None Present     Neurological Examination    Cognitive: AAO x 3                                                                         Attention: Intact   Judgment: Good evidence of judgement                               Memory: Recall 3 objects immediate and 3 min later      Mood/Affect: wnl                                                                           Communication:  Fluent,  No dysarthria   Swallow: intact  CN II - XII  intact  Coordination: FTN/HTS intact                                                                              Sensory: Intact to light touch, PP and Vibration                                                                                             Tone: normal Throughout   Balance: impaired    Motor    LEFT    UE: SF [5/5], EF [5/5], EE [5/5], WE [5/5],  [wnl]  RIGHT UE: SF [5/5], EF [5/5], EE [5/5], WE [5/5],  [wnl]  LEFT    LE:  HF [5/5], KE [5/5], DF [5/5], EHL [5/5],  PF [5/5]  RIGHT LE:  HF [5/5], KE [5/5], DF [5/5], EHL [5/5],  PF [5/5]      Reflex:  2 + thoroughout, Sheffield/Babinski negative PHYSICAL EXAM  VITALS  T(C): 37.6 (03-09-23 @ 12:41), Max: 37.6 (03-09-23 @ 12:41)  HR: 76 (03-09-23 @ 12:41) (60 - 76)  BP: 109/96 (03-09-23 @ 12:41) (109/96 - 148/66)  RR: 19 (03-09-23 @ 12:41) (18 - 19)  SpO2: 100% (03-09-23 @ 12:41) (95% - 100%)    Gen - NAD, Comfortable  HEENT - NCAT, EOMI, MMM  Neck - Supple, No limited ROM  Pulm - CTAB, No wheeze, No rhonchi, No crackles  Cardiovascular - RRR, S1S2  Chest - good chest expansion, good respiratory effort  Abdomen - Soft, NT/ND, +BS  Extremities - No Cyanosis, no clubbing, + edema to right leg, no calf tenderness  Neuro-     Cognitive - awake, alert, oriented to person, place, date, year, and situation.  Able  to follow command     Communication - Fluent, Comprehensible     Attention: Intact, able to state days of week chronologically and backwards. Able to perform simple additions and subtractions     Judgement: Good evidence of judgement     Memory: Recall 3 objects immediate and 3 min later     Cranial Nerves - CN 2-12 intact. No facial asymmetry, Tongue midline, EOMI, Shoulder shrug intact     Motor -                     LEFT    UE - 5/5                    RIGHT UE -  5/5                    LEFT    LE - F 5/5                    RIGHT LE - HF & KE- not tested, DF 5/5, PF 5/5        Sensory - Intact  to LT      Reflexes - DTR Intact, No primitive reflexive     Coordination - FTN intact   Psychiatric - Mood stable, Affect WNL  Skin:  Right hip incision x 3 with aquacel dressing. middle dressing slightly saturated. PHYSICAL EXAM  VITALS  T(C): 37.6 (03-09-23 @ 12:41), Max: 37.6 (03-09-23 @ 12:41)  HR: 76 (03-09-23 @ 12:41) (60 - 76)  BP: 109/96 (03-09-23 @ 12:41) (109/96 - 148/66)  RR: 19 (03-09-23 @ 12:41) (18 - 19)  SpO2: 100% (03-09-23 @ 12:41) (95% - 100%)    Gen - NAD, Comfortable  HEENT - NCAT, EOMI, MMM  Neck - Supple, No limited ROM  Pulm - CTAB, No wheeze, No rhonchi, No crackles  Cardiovascular - RRR, S1S2  Chest - good chest expansion, good respiratory effort  Abdomen - Soft, NT/ND, +BS  Extremities - No Cyanosis, no clubbing, + edema to proximal right leg, no calf tenderness  Neuro-     Cognitive - awake, alert, oriented to person, place, date, year, and situation.  Able  to follow command     Communication - Fluent, Comprehensible     Attention: Intact, able to state days of week chronologically and backwards. Able to perform simple additions and subtractions     Judgement: Good evidence of judgement     Memory: Recall 3 objects immediate and 3 min later     Cranial Nerves - CN 2-12 intact. No facial asymmetry, Tongue midline, EOMI, Shoulder shrug intact     Motor -                     LEFT    UE - 5/5                    RIGHT UE -  5/5                    LEFT    LE - F 5/5                    RIGHT LE - HF & KE- not tested, DF 5/5, PF 5/5        Sensory - Intact  to LT      Reflexes - DTR Intact, No primitive reflexive     Coordination - FTN intact   Psychiatric - Mood stable, Affect WNL  Skin:  Right hip incision x 3 with aquacel dressing. middle dressing slightly saturated.

## 2023-03-09 NOTE — DISCHARGE NOTE PROVIDER - NSDCFUSCHEDAPPT_GEN_ALL_CORE_FT
Rolf Harris Physician [facility]  [address]  Scheduled Appointment: 06/05/2023

## 2023-03-10 ENCOUNTER — NON-APPOINTMENT (OUTPATIENT)
Age: 83
End: 2023-03-10

## 2023-03-10 VITALS
DIASTOLIC BLOOD PRESSURE: 62 MMHG | RESPIRATION RATE: 16 BRPM | HEART RATE: 77 BPM | TEMPERATURE: 98 F | OXYGEN SATURATION: 99 % | SYSTOLIC BLOOD PRESSURE: 149 MMHG

## 2023-03-10 DIAGNOSIS — S72.146A NONDISPLACED INTERTROCHANTERIC FRACTURE OF UNSPECIFIED FEMUR, INITIAL ENCOUNTER FOR CLOSED FRACTURE: ICD-10-CM

## 2023-03-10 DIAGNOSIS — S72.141A DISPLACED INTERTROCHANTERIC FRACTURE OF RIGHT FEMUR, INITIAL ENCOUNTER FOR CLOSED FRACTURE: ICD-10-CM

## 2023-03-10 LAB
ALBUMIN SERPL ELPH-MCNC: 2.6 G/DL — LOW (ref 3.3–5)
ALP SERPL-CCNC: 57 U/L — SIGNIFICANT CHANGE UP (ref 40–120)
ALT FLD-CCNC: 25 U/L — SIGNIFICANT CHANGE UP (ref 10–45)
ANION GAP SERPL CALC-SCNC: 7 MMOL/L — SIGNIFICANT CHANGE UP (ref 5–17)
AST SERPL-CCNC: 51 U/L — HIGH (ref 10–40)
BASOPHILS # BLD AUTO: 0.06 K/UL — SIGNIFICANT CHANGE UP (ref 0–0.2)
BASOPHILS NFR BLD AUTO: 0.8 % — SIGNIFICANT CHANGE UP (ref 0–2)
BILIRUB SERPL-MCNC: 0.5 MG/DL — SIGNIFICANT CHANGE UP (ref 0.2–1.2)
BUN SERPL-MCNC: 13 MG/DL — SIGNIFICANT CHANGE UP (ref 7–23)
CALCIUM SERPL-MCNC: 8.2 MG/DL — LOW (ref 8.4–10.5)
CHLORIDE SERPL-SCNC: 110 MMOL/L — HIGH (ref 96–108)
CO2 SERPL-SCNC: 28 MMOL/L — SIGNIFICANT CHANGE UP (ref 22–31)
CREAT SERPL-MCNC: 0.67 MG/DL — SIGNIFICANT CHANGE UP (ref 0.5–1.3)
EGFR: 86 ML/MIN/1.73M2 — SIGNIFICANT CHANGE UP
EOSINOPHIL # BLD AUTO: 0.3 K/UL — SIGNIFICANT CHANGE UP (ref 0–0.5)
EOSINOPHIL NFR BLD AUTO: 3.8 % — SIGNIFICANT CHANGE UP (ref 0–6)
GLUCOSE SERPL-MCNC: 99 MG/DL — SIGNIFICANT CHANGE UP (ref 70–99)
HCT VFR BLD CALC: 24.4 % — LOW (ref 34.5–45)
HCT VFR BLD CALC: 24.4 % — LOW (ref 34.5–45)
HGB BLD-MCNC: 7.9 G/DL — LOW (ref 11.5–15.5)
HGB BLD-MCNC: 8 G/DL — LOW (ref 11.5–15.5)
IMM GRANULOCYTES NFR BLD AUTO: 1 % — HIGH (ref 0–0.9)
LYMPHOCYTES # BLD AUTO: 1.68 K/UL — SIGNIFICANT CHANGE UP (ref 1–3.3)
LYMPHOCYTES # BLD AUTO: 21.1 % — SIGNIFICANT CHANGE UP (ref 13–44)
MCHC RBC-ENTMCNC: 32.8 GM/DL — SIGNIFICANT CHANGE UP (ref 32–36)
MCHC RBC-ENTMCNC: 33.6 PG — SIGNIFICANT CHANGE UP (ref 27–34)
MCV RBC AUTO: 102.5 FL — HIGH (ref 80–100)
MONOCYTES # BLD AUTO: 1.1 K/UL — HIGH (ref 0–0.9)
MONOCYTES NFR BLD AUTO: 13.8 % — SIGNIFICANT CHANGE UP (ref 2–14)
NEUTROPHILS # BLD AUTO: 4.74 K/UL — SIGNIFICANT CHANGE UP (ref 1.8–7.4)
NEUTROPHILS NFR BLD AUTO: 59.5 % — SIGNIFICANT CHANGE UP (ref 43–77)
NRBC # BLD: 0 /100 WBCS — SIGNIFICANT CHANGE UP (ref 0–0)
PLATELET # BLD AUTO: 146 K/UL — LOW (ref 150–400)
POTASSIUM SERPL-MCNC: 3.8 MMOL/L — SIGNIFICANT CHANGE UP (ref 3.5–5.3)
POTASSIUM SERPL-SCNC: 3.8 MMOL/L — SIGNIFICANT CHANGE UP (ref 3.5–5.3)
PROT SERPL-MCNC: 5.3 G/DL — LOW (ref 6–8.3)
RBC # BLD: 2.38 M/UL — LOW (ref 3.8–5.2)
RBC # FLD: 14.1 % — SIGNIFICANT CHANGE UP (ref 10.3–14.5)
SODIUM SERPL-SCNC: 145 MMOL/L — SIGNIFICANT CHANGE UP (ref 135–145)
TROPONIN I, HIGH SENSITIVITY RESULT: 7.2 NG/L — SIGNIFICANT CHANGE UP
WBC # BLD: 7.96 K/UL — SIGNIFICANT CHANGE UP (ref 3.8–10.5)
WBC # FLD AUTO: 7.96 K/UL — SIGNIFICANT CHANGE UP (ref 3.8–10.5)

## 2023-03-10 PROCEDURE — 85027 COMPLETE CBC AUTOMATED: CPT

## 2023-03-10 PROCEDURE — 71045 X-RAY EXAM CHEST 1 VIEW: CPT

## 2023-03-10 PROCEDURE — 86900 BLOOD TYPING SEROLOGIC ABO: CPT

## 2023-03-10 PROCEDURE — 80048 BASIC METABOLIC PNL TOTAL CA: CPT

## 2023-03-10 PROCEDURE — 97166 OT EVAL MOD COMPLEX 45 MIN: CPT

## 2023-03-10 PROCEDURE — 85610 PROTHROMBIN TIME: CPT

## 2023-03-10 PROCEDURE — 36415 COLL VENOUS BLD VENIPUNCTURE: CPT

## 2023-03-10 PROCEDURE — 97162 PT EVAL MOD COMPLEX 30 MIN: CPT

## 2023-03-10 PROCEDURE — 93010 ELECTROCARDIOGRAM REPORT: CPT

## 2023-03-10 PROCEDURE — 86901 BLOOD TYPING SEROLOGIC RH(D): CPT

## 2023-03-10 PROCEDURE — 99285 EMERGENCY DEPT VISIT HI MDM: CPT

## 2023-03-10 PROCEDURE — 99223 1ST HOSP IP/OBS HIGH 75: CPT

## 2023-03-10 PROCEDURE — 86923 COMPATIBILITY TEST ELECTRIC: CPT

## 2023-03-10 PROCEDURE — 80076 HEPATIC FUNCTION PANEL: CPT

## 2023-03-10 PROCEDURE — 93005 ELECTROCARDIOGRAM TRACING: CPT

## 2023-03-10 PROCEDURE — 80053 COMPREHEN METABOLIC PANEL: CPT

## 2023-03-10 PROCEDURE — C1713: CPT

## 2023-03-10 PROCEDURE — 87635 SARS-COV-2 COVID-19 AMP PRB: CPT

## 2023-03-10 PROCEDURE — 76000 FLUOROSCOPY <1 HR PHYS/QHP: CPT

## 2023-03-10 PROCEDURE — C1889: CPT

## 2023-03-10 PROCEDURE — C1769: CPT

## 2023-03-10 PROCEDURE — 73501 X-RAY EXAM HIP UNI 1 VIEW: CPT

## 2023-03-10 PROCEDURE — 85025 COMPLETE CBC W/AUTO DIFF WBC: CPT

## 2023-03-10 PROCEDURE — 86850 RBC ANTIBODY SCREEN: CPT

## 2023-03-10 RX ORDER — SODIUM CHLORIDE 9 MG/ML
1000 INJECTION, SOLUTION INTRAVENOUS
Refills: 0 | Status: COMPLETED | OUTPATIENT
Start: 2023-03-10 | End: 2023-03-10

## 2023-03-10 RX ORDER — SODIUM CHLORIDE 9 MG/ML
500 INJECTION INTRAMUSCULAR; INTRAVENOUS; SUBCUTANEOUS ONCE
Refills: 0 | Status: COMPLETED | OUTPATIENT
Start: 2023-03-10 | End: 2023-03-10

## 2023-03-10 RX ORDER — PANTOPRAZOLE SODIUM 20 MG/1
40 TABLET, DELAYED RELEASE ORAL
Refills: 0 | Status: DISCONTINUED | OUTPATIENT
Start: 2023-03-10 | End: 2023-03-23

## 2023-03-10 RX ADMIN — AMLODIPINE BESYLATE 5 MILLIGRAM(S): 2.5 TABLET ORAL at 05:46

## 2023-03-10 RX ADMIN — SODIUM CHLORIDE 1000 MILLILITER(S): 9 INJECTION INTRAMUSCULAR; INTRAVENOUS; SUBCUTANEOUS at 10:03

## 2023-03-10 RX ADMIN — Medication 975 MILLIGRAM(S): at 06:15

## 2023-03-10 RX ADMIN — SODIUM CHLORIDE 75 MILLILITER(S): 9 INJECTION, SOLUTION INTRAVENOUS at 12:53

## 2023-03-10 RX ADMIN — Medication 975 MILLIGRAM(S): at 05:44

## 2023-03-10 RX ADMIN — ENOXAPARIN SODIUM 40 MILLIGRAM(S): 100 INJECTION SUBCUTANEOUS at 14:58

## 2023-03-10 RX ADMIN — Medication 975 MILLIGRAM(S): at 22:00

## 2023-03-10 RX ADMIN — SERTRALINE 100 MILLIGRAM(S): 25 TABLET, FILM COATED ORAL at 12:54

## 2023-03-10 RX ADMIN — Medication 975 MILLIGRAM(S): at 14:59

## 2023-03-10 RX ADMIN — POLYETHYLENE GLYCOL 3350 17 GRAM(S): 17 POWDER, FOR SOLUTION ORAL at 12:53

## 2023-03-10 RX ADMIN — Medication 975 MILLIGRAM(S): at 21:10

## 2023-03-10 NOTE — PROVIDER CONTACT NOTE (OTHER) - ACTION/TREATMENT ORDERED:
continue to monitor.
Fluid bolus ordered and completed, will continue to monitor.
EKG and troponin level will continue to follow
Saline chloride 0.45% ordered 1000ml at 75ml/hr, will continue to monitor

## 2023-03-10 NOTE — PROVIDER CONTACT NOTE (OTHER) - SITUATION
Patient became unresponsive with position change, patient felt nausea and dizzy from bathroom to bed while working with Occupational therapy. Patient stated she had a bowel movement while in bathroom.
Patient became unresponsive while with Occupational therapy. 2 hour post rapid response note
Patient became unresponsive while with occupational therapy, 6 hour post rapid response
Patient became unresponsive while with occupational therapy. 4 Hour post rapid response

## 2023-03-10 NOTE — CONSULT NOTE ADULT - SUBJECTIVE AND OBJECTIVE BOX
Dr. Hutchins Hospitalist Progress Note  TIAGO RAYMUNDO [id number]    Patient is a 82y old  Female who presents with a chief complaint of Displaced intertrochanteric fracture of right femur, initial encounter for closed fracture     (10 Mar 2023 14:03)    HPI:  81 yo F with PMH of HTN, depression, hearing loss and colon CA (2002, s/p chemo) presents to ED at [facility] on 3/7/23 with right hip pain after mechanical fall at home while watering plants.  No prior h/o falls or osteoporosis.  Pt w/ severe pain on movement.  BP elevated to 179/82, WBC 13.64. EKG showed sinus inderjit at 58 bpm, right hip/pelvis x-ray showed a comminuted, displaced, angulated right IT hip fracture, CXR showed no acute infiltrates. Ortho was consulted and pt underwent ORIF of right hip fx using locking intramedullary jailene later on 3/7 by Dr. Rojas without complications.  Postop course notable with some anemia (hgb 14.4-->9.6), but clinically stable and otherwise unremarkable.  Cleared for WBAT R Eder and LMWH VTE prophylaxis.  Leucocytosis resolved.  Patient was evaluated by physical therapy and occupational therapy and acute rehab was recommended.  Patient should f/u with Dr. Rojas and pcp outpatient. Cleared for transfer to the rehab unit on 3/9.   (09 Mar 2023 12:10)    Interval;  no overnight  RRT in am for syncope. per RN and patient, she went to restroom, had to strain to move bowel, moved bowel and when coming back to bed, patient felt dizzy and became unresponsive for 1-2 seconds and retuned back to normal.   systolic BP dropped less than 20 points from lying  patient reported no other symptoms reported      ROS:  denied fever/chills/CP/SOB/cough/palpitation/dizziness/abdominal pian/nausea/vomiting/diarrhoea/constipation/dysuria/leg or calf pain/headaches.all other ROS neg    Allergy:  NKDA      MEDICATIONS  (STANDING):  acetaminophen     Tablet .. 975 milliGRAM(s) Oral every 8 hours  enoxaparin Injectable 40 milliGRAM(s) SubCutaneous every 24 hours  polyethylene glycol 3350 17 Gram(s) Oral daily  sertraline 100 milliGRAM(s) Oral daily    MEDICATIONS  (PRN):  oxyCODONE    IR 5 milliGRAM(s) Oral every 6 hours PRN Severe Pain (7 - 10)  senna 2 Tablet(s) Oral at bedtime PRN Constipation      PAST MEDICAL HISTORY:  Colon cancer 2002 had chemo.    Hard of hearing     HTN (hypertension) x 3 years, controlled    Volvulus of ascending colon 7/2017  s/p colon surgery.     PAST SURGICAL HISTORY:  S/P colon resection     S/P tonsillectomy and adenoidectomy.     FAMILY HISTORY:  Grandparent  Still living? Unknown  FH: colon cancer, Age at diagnosis: Age Unknown.      Social:  SOCIAL HISTORY  Smoking - former smoker. quit in 1990.   EtOH - social weekend drinker   Drugs - Denied    T(C): 36.6 (03-10-23 @ 09:38), Max: 37.8 (03-09-23 @ 17:29)  HR: 84 (03-10-23 @ 12:58) (68 - 84)  BP: 131/72 (03-10-23 @ 12:58) (130/64 - 149/62)  RR: 16 (03-10-23 @ 09:38) (14 - 16)  SpO2: 99% (03-10-23 @ 09:38) (92% - 99%)  CAPILLARY BLOOD GLUCOSE      POCT Blood Glucose.: 160 mg/dL (10 Mar 2023 07:57)      Physical Exam:  GENERAL: Not in distress. Alert    HEENT:  Normocephalic and atraumatic. PEARLA,EOMI  NECK: Supple.  No JVD.    CARDIOVASCULAR: RRR S1, S2. No murmur/rubs/gallop  LUNGS: BLAE+, no rales, no wheezing, no rhonchi.    ABDOMEN: ND. Soft,  NT, no guarding / rebound / rigidity. BS normoactive. No CVA tenderness.    BACK: No spine tenderness.  EXTREMITIES: no cyanosis, no clubbing, no edema.   SKIN: no rash. No skin break or ulcer. No cellulitis. righthip no TP.no hematoma  NEUROLOGIC: AAO*3.strength is symmetric, sensation intact, speech fluent.    PSYCHIATRIC: Calm.  No agitation.    Labs                        8.0    7.96  )-----------( 146      ( 10 Mar 2023 06:15 )             24.4     03-10    145  |  110<H>  |  13  ----------------------------<  99  3.8   |  28  |  0.67    Ca    8.2<L>      10 Mar 2023 06:15    TPro  5.3<L>  /  Alb  2.6<L>  /  TBili  0.5  /  DBili  x   /  AST  51<H>  /  ALT  25  /  AlkPhos  57  03-10

## 2023-03-10 NOTE — DIETITIAN INITIAL EVALUATION ADULT - OTHER INFO
Initial Nutrition Assessment   82yr Old Female   Denies Food Allergy/Intolerance  Tolerates Diet Well - No Chewing/Swallowing Complications (Per Patient)  Surgical Incision on Right Thigh x2 & Right Knee and No Pressure Ulcers (as Per Nursing Flow Sheets)  +1 Edema Noted to Right Lower Extremity & +2 Edema Noted to Right Hip(as Per Nursing Flow Sheets)  No Recent Nausea/Vomiting/Diarrhea/Constipation (as Per Patient)

## 2023-03-10 NOTE — DIETITIAN INITIAL EVALUATION ADULT - ADD RECOMMEND
1) Monitor Weights, Intake, Tolerance, Skin & Labwork  2) Education Provided on Proper Nutrition   3) Continue Nutrition Plan of Care

## 2023-03-10 NOTE — DIETITIAN INITIAL EVALUATION ADULT - PERTINENT MEDS FT
MEDICATIONS  (STANDING):  acetaminophen     Tablet .. 975 milliGRAM(s) Oral every 8 hours  enoxaparin Injectable 40 milliGRAM(s) SubCutaneous every 24 hours  polyethylene glycol 3350 17 Gram(s) Oral daily  sertraline 100 milliGRAM(s) Oral daily    MEDICATIONS  (PRN):  oxyCODONE    IR 5 milliGRAM(s) Oral every 6 hours PRN Severe Pain (7 - 10)  senna 2 Tablet(s) Oral at bedtime PRN Constipation

## 2023-03-10 NOTE — DIETITIAN INITIAL EVALUATION ADULT - NS FNS DIET ORDER
on Regular Diet (IDDSI Level 7) w/ Thin Liquids (IDDSI Level 0)   Education Provided on Proper Nutrition

## 2023-03-10 NOTE — PROGRESS NOTE ADULT - SUBJECTIVE AND OBJECTIVE BOX
Patient is a 82y old  Female who presents with a chief complaint of s/p right hip fx repair (09 Mar 2023 12:10)    INTERVAL HPI/ OVERNIGHT EVENTS: No significant overnight events. Pt was seen and examined with Marie Velasquez NP at bedside today.        Allergies  No Known Allergies    MEDICATIONS  (STANDING):  acetaminophen     Tablet .. 975 milliGRAM(s) Oral every 8 hours  amLODIPine   Tablet 5 milliGRAM(s) Oral daily  enoxaparin Injectable 40 milliGRAM(s) SubCutaneous every 24 hours  polyethylene glycol 3350 17 Gram(s) Oral daily  sertraline 100 milliGRAM(s) Oral daily    MEDICATIONS  (PRN):  oxyCODONE    IR 5 milliGRAM(s) Oral every 6 hours PRN Severe Pain (7 - 10)  senna 2 Tablet(s) Oral at bedtime PRN Constipation    Vital Signs Last 24 Hrs  T(C): 36.7 (10 Mar 2023 05:46), Max: 37.8 (09 Mar 2023 17:29)  T(F): 98 (10 Mar 2023 05:46), Max: 100.1 (09 Mar 2023 17:29)  HR: 68 (10 Mar 2023 05:46) (68 - 76)  BP: 138/62 (10 Mar 2023 05:46) (109/96 - 138/62)  BP(mean): --  RR: 14 (10 Mar 2023 05:46) (14 - 19)  SpO2: 97% (10 Mar 2023 05:46) (92% - 100%)  Parameters below as of 10 Mar 2023 05:46  Patient On (Oxygen Delivery Method): room air    PHYSICAL EXAM:  Gen - NAD, Comfortable  HEENT - NCAT, EOMI, MMM  Neck - Supple, No limited ROM  Pulm - CTAB, No wheeze, No rhonchi, No crackles  Cardiovascular - RRR, S1S2  Chest - good chest expansion, good respiratory effort  Abdomen - Soft, NT/ND, +BS  Extremities - No Cyanosis, no clubbing, + edema to right leg, no calf tenderness  Neuro-     Cognitive - awake, alert, oriented to person, place, date, year, and situation.  Able  to follow command     Communication - Fluent, Comprehensible     Attention: Intact, able to state days of week chronologically and backwards. Able to perform simple additions and subtractions     Judgement: Good evidence of judgement     Memory: Recall 3 objects immediate and 3 min later     Cranial Nerves - CN 2-12 intact. No facial asymmetry, Tongue midline, EOMI, Shoulder shrug intact     Motor -                     LEFT    UE - 5/5                    RIGHT UE -  5/5                    LEFT    LE - F 5/5                    RIGHT LE - HF & KE- not tested, DF 5/5, PF 5/5        Sensory - Intact  to LT      Reflexes - DTR Intact, No primitive reflexive     Coordination - FTN intact   Psychiatric - Mood stable, Affect WNL  Skin:  Right hip incision x 3 with aquacel dressing. middle dressing slightly saturated        LABS/IMAGIN.0    7.96  )-----------( 146      ( 10 Mar 2023 06:15 )             24.4                9.6    9.51  )-----------( 172      ( 08 Mar 2023 06:50 )             28.4                       14.4   13.64 )-----------( 201      ( 07 Mar 2023 10:50 )             43.0     03-10    145  |  110<H>  |  13  ----------------------------<  99  3.8   |  28  |  0.67      TPro  5.3<L>  /  Alb  2.6<L>  /  TBili  0.5  /  DBili  x   /  AST  51<H>  /  ALT  25  /  AlkPhos  57  03-10  TPro  7.2  /  Alb  3.5  /  TBili  0.5  /  DBili  x   /  AST  69<H>  /  ALT  42  /  AlkPhos  57  03-07      Xray Hip w/ Pelvis 1 View, Right (23 @ 11:11) >  FINDINGS: There is a comminuted, displaced and angulated fracture of the proximal right femoral shaft extending into the lesser trochanter. There is moderate soft tissue swelling. There is no dislocation. The bilateral sacroiliac joints and pubic symphysis are intact.  Impression: Right proximal femur fracture. Obtain CT if indicated.        ASSESSMENT/PLAN: 81 yo F with PMH of HTN, depression, hearing loss and colon CA (, s/p chemo) presents to ED [facility] on 3/7 with right hip pain after mechanical fall.  Pt w/ severe pain on movement. BP elevated to 179/82, WBC 13.64. EKG showed sinus inderjit at 58 bpm, R hip/pelvis x-ray showed a R hip fracture.  S/p ORIF R IT hip fx with locking IM jailene performed by Dr. Rojas w/o complications. Patient was evaluated by physical therapy and occupational therapy and acute rehab was recommended. Patient's h/h downtrended due to postoperative blood loss- asymptomatic. Patient should f/u with Dr. Rojas and pcp outpatient. Cleared for dc on 3/9. WBAT.     #S/p comminuted, displaced and angulated R IT hip fx due to mechanical fall 3/7/23  -s/p ORIF with IM jailene by Dr. Rojas on 3/7.  -WBAT  -Pain Mgmt: Tylenol PRN    #Impaired ADLs/mobility, disposition/baseline (home:     Lives with family , 3 shelly , +stairs inside; Independent PTA  pt lives alone in private house, 3 shelly w/rail, 1 flight to br. pt is independent w/ambulation and ADL's, +drive    - Fxnal status during acute hospitalization: min a  - Begin multidisciplinary intensive rehab program with team conference qwk  3/10: undergoing acute inpatient rehabilitation therapy evals    #Acute blood loss anemia likely post op anemia  - Presenting Hgb 14.4 on 3/7/23 in ED  -  mls in OR 3/7/23  - monitor cbc  3/10: Hgb 8.0 this AM    #Leukocytosis-resolved  - Likely reactive, no evidence of infection  - Monitor CBC  3/10: aleucocytotic    #Elevated AST  - Mildly elevated AST 69, ALT 42 wnl  - Monitor CMP  3/10: AST 51, improving, clinically stable    #Cardiovascular/ h/o HTN  - sinus inderjit on presentation  - Continue Amlodipine 5 mg daily    #Depression  - Continue Sertraline 100 mg daily    #skin  - Right hip incision x 3 with aquacel dressing. middle dressing slightly saturated.    #VTE PPx  - Enoxaparin, routine mechanical measures  - monitor for clots and adverse efx of ppx measures  3/10 plt 146 this AM, continue to monitor, elisabeth while on LMWH    #GI/Bowel Mgmt  - Senna,  Miralax PRN    #/Bladder Mgmt  - Voiding volitionally, PVR     Patient is a 82y old  Female who presents with a chief complaint of s/p right hip fx repair (09 Mar 2023 12:10)    INTERVAL HPI/ OVERNIGHT EVENTS: No significant overnight events. Responded to a Rapid Response call to pt's bedside about 7:50-7:55 AM.  Pt seen extensively at that time, later in AM with [facility] staff, then with Marie Velasquez NP at bedside, then again on my own today.    Pt had constipation with no BM since before her hip fracture.  She walked with OT to toilet in bathroom in her room, strained hard to pass BM, which she did.  After toileting hygiene, she was walking back to bed when she became lightheaded and poorly responsive, helped into supine position by therapist and staff (no fall), when her mental status quickly returned to baseline/normal.  No CP, SOB, palpitations, nausea, HA or any other symptoms preceded the event.  Vitals in supine were normal, with BP in low 110s systolic.  Orthostatic BPs subsequently were positive for orthostatic hypotension.  Case d/w hospitalist who also responded to Rapid Response.  Event felt to be vasovagal/orthostatic hypotensive near-syncope worsened due to anemia and dehydration.  She did admit to avoiding drinking due to fear of having to pee so much (given mobility difficulty) and feeling thirsty.  IV fluid bolus given.  She consumed fluids.  Management changes made (see plan below).  Pt later felt well, tolerated upright activities/ambulation.  Some pain at R hip with activities, but ok at rest.    Allergies  No Known Allergies    MEDICATIONS  (STANDING):  acetaminophen     Tablet .. 975 milliGRAM(s) Oral every 8 hours  amLODIPine   Tablet 5 milliGRAM(s) Oral daily - DISCONTINUED  enoxaparin Injectable 40 milliGRAM(s) SubCutaneous every 24 hours  polyethylene glycol 3350 17 Gram(s) Oral daily  sertraline 100 milliGRAM(s) Oral daily    MEDICATIONS  (PRN):  oxyCODONE    IR 5 milliGRAM(s) Oral every 6 hours PRN Severe Pain (7 - 10)  senna 2 Tablet(s) Oral at bedtime PRN Constipation    Vital Signs Last 24 Hrs  T(C): 36.7 (10 Mar 2023 05:46), Max: 37.8 (09 Mar 2023 17:29)  T(F): 98 (10 Mar 2023 05:46), Max: 100.1 (09 Mar 2023 17:29)  HR: 68 (10 Mar 2023 05:46) (68 - 76)  BP: 138/62 (10 Mar 2023 05:46) (109/96 - 138/62)  BP(mean): --  RR: 14 (10 Mar 2023 05:46) (14 - 19)  SpO2: 97% (10 Mar 2023 05:46) (92% - 100%)  Parameters below as of 10 Mar 2023 05:46  Patient On (Oxygen Delivery Method): room air    PHYSICAL EXAM:  Gen - NAD, Comfortable  HEENT - NCAT, mucous membranes dry  Pulm - CTA b/l no adventitious sounds  Cardiovascular - S1S2 RRR  Abdomen - BS+ Soft, NT/ND  Extremities - R hip/femur with 3 separate Aquacels with minimal serosanguinous type drainage underneath (Aquacels were not removed); mild R hip/upper thigh edema without erythema, NTTP.  No distal edema x 4 ext.  Calves NTTP b/l  Neuro-     Cognitive - awake, alert, oriented to person, place, date, year, and situation.  Able  to follow command     Communication - Fluent, intelligible     Attention: Intact     Judgement: Good evidence of judgement     Cranial Nerves - No facial asymmetry at rest or with facial expressions     Motor - 5/5 b/l sh elev, , ADF, APF.  Pain limited at R hip.  L HF at least good.     Sensory - Intact  to LT x 4 distal limbs  Psychiatric - Mood stable, Affect WNL      LABS/IMAGIN.0    7.96  )-----------( 146      ( 10 Mar 2023 06:15 )             24.4                8.5    8.36  )-----------( 142      ( 09 Mar 2023 07:49 )             25.9                9.6    9.51  )-----------( 172      ( 08 Mar 2023 06:50 )             28.4                       14.4   13.64 )-----------( 201      ( 07 Mar 2023 10:50 )             43.0     03-10    145  |  110<H>  |  13  ----------------------------<  99  3.8   |  28  |  0.67      Trop 7.2 ( 10 Mar 2023 08:50 )  Trop 7.3 ( 10 Mar 2023 06:15 )      TPro  5.3<L>  /  Alb  2.6<L>  /  TBili  0.5  /  DBili  x   /  AST  51<H>  /  ALT  25  /  AlkPhos  57  03-10  TPro  7.2  /  Alb  3.5  /  TBili  0.5  /  DBili  x   /  AST  69<H>  /  ALT  42  /  AlkPhos  57        Xray Hip w/ Pelvis 1 View, Right (23 @ 11:11) >  FINDINGS: There is a comminuted, displaced and angulated fracture of the proximal right femoral shaft extending into the lesser trochanter. There is moderate soft tissue swelling. There is no dislocation. The bilateral sacroiliac joints and pubic symphysis are intact.  Impression: Right proximal femur fracture. Obtain CT if indicated.        ASSESSMENT/PLAN: 83 yo F with PMH of HTN, depression, hearing loss and colon CA (, s/p chemo) presents to [facility] ED on 3/7/23 with R hip pain after a mechanical fall at home while watering plants.  X-rays showed a R IT hip fx.  BP elevated to 179/82, WBC 13.64.  EKG showed sinus inderjit at 58 bpm,  S/p ORIF R IT hip fx with locking IM jailene on 3/7 performed by Dr. Rojas w/o complications.  WBAT, LMWH VTE ppx.  Hgb downtrended due to postop blood loss- asymptomatic.  Leucocytosis resolved.  Transferred to the [address] [place] [address] acute rehab unit at [facility] on 3/9.    #S/p comminuted, displaced and angulated R IT hip fx due to mechanical fall 3/7/23  -s/p ORIF with IM jailene by Dr. Rojas on 3/7.  -WBAT  -Pain Mgmt: Tylenol PRN  -Local wound care per ortho surgery  3/10: stable, continue rehab.  R hip incision x 3 with Aquacel dsgs (kept in place), old mild serosanguinous drainage visible underneath    #Impaired ADLs/mobility, disposition/baseline (home: pt lives alone in private house, 3 shelly w/rail, 1 flight to .  Pt is independent w/ambulation and ADL's w/o AD at baseline, +drives/shops.  Has .  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   as of Dec 2021.  Daughter in [place], son in [place], son in [place].  Daughter to be visiting pt here)  - Fxnal status during acute hospitalization: min a  - Begin multidisciplinary intensive rehab program with team conference qwk  3/10: undergoing acute inpatient rehabilitation therapy evals.  Vasovagal/orthostatic hypotension pre-syncopal episode this AM; doing better after IV and po fluids.  TEDs added, amlodipine d/c'ed.  Rec therapy added given pt avid classical .    #Acute blood loss anemia likely post op anemia  - Presenting Hgb 14.4 on 3/7/23 in ED  -  mls in OR 3/7/23  - monitor cbc  3/10: Hgb 8.0 this AM, no signs of bleeding, continue to monitor.    #Cardiovascular/ h/o HTN  - sinus inderjit on presentation  - Transferred to rehab on amlodipine 5 mg daily  3/10: amlodipine d/c'ed by hospitalist after pre-syncopal episode this AM.  Troponins neg.  TEDs added.    #VTE PPx  - Enoxaparin, routine mechanical measures  - monitor for clots and adverse efx of ppx measures  3/10 plt 146 this AM, continue to monitor, elisabeth while on LMWH    #Leukocytosis-resolved  - Likely reactive, no evidence of infection  - Monitor CBC  3/10: aleucocytotic    #Elevated AST  - Mildly elevated AST 69, ALT 42 wnl  - Monitor CMP  3/10: AST 51, improving, clinically stable    #Depression  - on Sertraline 100 mg daily  3/10: clinically stable    #GI/Bowel Mgmt  - Senna,  Miralax PRN    #/Bladder Mgmt  - Voiding volitionally, Primafit added for nights so pt doesn't have to worry about drinking too much fluid and having to get up to void at night     Patient is a 82y old  Female who presents with a chief complaint of s/p right hip fx repair (09 Mar 2023 12:10)    INTERVAL HPI/ OVERNIGHT EVENTS: No significant overnight events. Responded to a Rapid Response call to pt's bedside about 7:50-7:55 AM.  Pt seen extensively at that time, later in AM with [facility] staff, then with Marie Velasquez NP at bedside, then again on my own today.    Pt had constipation with no BM since before her hip fracture.  She walked with OT to toilet in bathroom in her room, strained hard to pass BM, which she did.  After toileting hygiene, she was walking back to bed when she became lightheaded and poorly responsive, helped into supine position by therapist and staff (no fall), when her mental status quickly returned to baseline/normal.  No CP, SOB, palpitations, nausea, HA or any other symptoms preceded the event.  Vitals in supine were normal, with BP in low 110s systolic.  Orthostatic BPs subsequently were positive for orthostatic hypotension.  Case d/w hospitalist who also responded to Rapid Response.  Event felt to be vasovagal/orthostatic hypotensive near-syncope worsened due to anemia and dehydration.  She did admit to avoiding drinking due to fear of having to pee so much (given mobility difficulty) and feeling thirsty.  IV fluid bolus given.  She consumed fluids.  Management changes made (see plan below).  Pt later felt well, tolerated upright activities/ambulation.  Some pain at R hip with activities, but ok at rest.    Allergies  No Known Allergies    MEDICATIONS  (STANDING):  acetaminophen     Tablet .. 975 milliGRAM(s) Oral every 8 hours  amLODIPine   Tablet 5 milliGRAM(s) Oral daily - DISCONTINUED  enoxaparin Injectable 40 milliGRAM(s) SubCutaneous every 24 hours  polyethylene glycol 3350 17 Gram(s) Oral daily  sertraline 100 milliGRAM(s) Oral daily    MEDICATIONS  (PRN):  oxyCODONE    IR 5 milliGRAM(s) Oral every 6 hours PRN Severe Pain (7 - 10)  senna 2 Tablet(s) Oral at bedtime PRN Constipation    Vital Signs Last 24 Hrs  T(C): 36.7 (10 Mar 2023 05:46), Max: 37.8 (09 Mar 2023 17:29)  T(F): 98 (10 Mar 2023 05:46), Max: 100.1 (09 Mar 2023 17:29)  HR: 68 (10 Mar 2023 05:46) (68 - 76)  BP: 138/62 (10 Mar 2023 05:46) (109/96 - 138/62)  BP(mean): --  RR: 14 (10 Mar 2023 05:46) (14 - 19)  SpO2: 97% (10 Mar 2023 05:46) (92% - 100%)  Parameters below as of 10 Mar 2023 05:46  Patient On (Oxygen Delivery Method): room air    PHYSICAL EXAM:  Gen - NAD, Comfortable  HEENT - NCAT, mucous membranes dry  Pulm - CTA b/l no adventitious sounds  Cardiovascular - S1S2 RRR  Abdomen - BS+ Soft, NT/ND  Extremities - R hip/femur with 3 separate Aquacels with minimal serosanguinous type drainage underneath (Aquacels were not removed); mild R hip/upper thigh edema without erythema, NTTP.  No distal edema x 4 ext.  Calves NTTP b/l  Neuro-     Cognitive - awake, alert, oriented to person, place, date, year, and situation.  Able  to follow command     Communication - Fluent, intelligible     Attention: Intact     Judgement: Good evidence of judgement     Cranial Nerves - No facial asymmetry at rest or with facial expressions     Motor - 5/5 b/l sh elev, , ADF, APF.  Pain limited at R hip.  L HF at least good.     Sensory - Intact  to LT x 4 distal limbs  Psychiatric - Mood stable, Affect WNL    ECG this AM 3/10/23: NSR with flipped T's in septal leads, no sig change vs 3/7/23 ECG (Sinus inderjit with flipped septal T's) / awaiting official cardio read.      LABS/IMAGIN.0    7.96  )-----------( 146      ( 10 Mar 2023 06:15 )             24.4                8.5    8.36  )-----------( 142      ( 09 Mar 2023 07:49 )             25.9                9.6    9.51  )-----------( 172      ( 08 Mar 2023 06:50 )             28.4                       14.4   13.64 )-----------( 201      ( 07 Mar 2023 10:50 )             43.0     03-10    145  |  110<H>  |  13  ----------------------------<  99  3.8   |  28  |  0.67      Trop 7.2 ( 10 Mar 2023 08:50 )  Trop 7.3 ( 10 Mar 2023 06:15 )      TPro  5.3<L>  /  Alb  2.6<L>  /  TBili  0.5  /  DBili  x   /  AST  51<H>  /  ALT  25  /  AlkPhos  57  03-10  TPro  7.2  /  Alb  3.5  /  TBili  0.5  /  DBili  x   /  AST  69<H>  /  ALT  42  /  AlkPhos  57        Xray Hip w/ Pelvis 1 View, Right (23 @ 11:11) >  FINDINGS: There is a comminuted, displaced and angulated fracture of the proximal right femoral shaft extending into the lesser trochanter. There is moderate soft tissue swelling. There is no dislocation. The bilateral sacroiliac joints and pubic symphysis are intact.  Impression: Right proximal femur fracture. Obtain CT if indicated.        ASSESSMENT/PLAN: 81 yo F with PMH of HTN, depression, hearing loss and colon CA (, s/p chemo) presents to [facility] ED on 3/7/23 with R hip pain after a mechanical fall at home while watering plants.  X-rays showed a R IT hip fx.  BP elevated to 179/82, WBC 13.64.  EKG showed sinus inderjit at 58 bpm,  S/p ORIF R IT hip fx with locking IM jailene on 3/7 performed by Dr. Rojas w/o complications.  WBAT, LMWH VTE ppx.  Hgb downtrended due to postop blood loss- asymptomatic.  Leucocytosis resolved.  Transferred to the [address] [place] [address] acute rehab unit at [facility] on 3/9.    #S/p comminuted, displaced and angulated R IT hip fx due to mechanical fall 3/7/23  -s/p ORIF with IM jailene by Dr. Rojas on 3/7.  -WBAT  -Pain Mgmt: Tylenol PRN  -Local wound care per ortho surgery  3/10: stable, continue rehab.  R hip incision x 3 with Aquacel dsgs (kept in place), old mild serosanguinous drainage visible underneath    #Impaired ADLs/mobility, disposition/baseline (home: pt lives alone in private house, 3 shelly w/rail, 1 flight to br.  Pt is independent w/ambulation and ADL's w/o AD at baseline, +drives/shops.  Has .  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   as of Dec 2021.  Daughter in [place], son in [place], son in [place].  Daughter to be visiting pt here)  - Fxnal status during acute hospitalization: min a  - Begin multidisciplinary intensive rehab program with team conference qwk  3/10: undergoing acute inpatient rehabilitation therapy evals.  Vasovagal/orthostatic hypotension pre-syncopal episode this AM; doing better after IV and po fluids.  TEDs added, amlodipine d/c'ed.  Rec therapy added given pt avid classical .    #Acute blood loss anemia likely post op anemia  - Presenting Hgb 14.4 on 3/7/23 in ED  -  mls in OR 3/7/23  - monitor cbc  3/10: Hgb 8.0 this AM, no signs of bleeding, continue to monitor.    #Cardiovascular/ h/o HTN  - sinus inderjit on presentation  - Transferred to rehab on amlodipine 5 mg daily  3/10: amlodipine d/c'ed by hospitalist after pre-syncopal episode this AM.  TEDs added.  No indication of cardiac event this AM: troponins neg.  ECG wet read showing NSR with flipped septal Ts (no significant change vs 3/7); await formal read.      #VTE PPx  - Enoxaparin, routine mechanical measures  - monitor for clots and adverse efx of ppx measures  3/10 plt 146 this AM, continue to monitor, elisabeth while on LMWH    #Leukocytosis-resolved  - Likely reactive, no evidence of infection  - Monitor CBC  3/10: aleucocytotic    #Elevated AST  - Mildly elevated AST 69, ALT 42 wnl  - Monitor CMP  3/10: AST 51, improving, clinically stable    #Depression  - on Sertraline 100 mg daily  3/10: clinically stable    #GI/Bowel Mgmt  - Senna,  Miralax PRN    #/Bladder Mgmt  - Voiding volitionally, Primafit added for nights so pt doesn't have to worry about drinking too much fluid and having to get up to void at night

## 2023-03-10 NOTE — PROVIDER CONTACT NOTE (OTHER) - ASSESSMENT
Status unchanged patient remains comfortable and participating in therapies, vital signs within normal limits.
Status unchanged patient remains comfortable and participating in therapies.
Patient stable, alert and responsive and Oriented x3
Patient stable, alert and oriented x4, Orthostatic blood pressure done laying 110/57 sitting 103/50 and standing 92/56. Patient was dizzy when laying to sitting but resolved and was able to stand with complaint of weakness.

## 2023-03-10 NOTE — DIETITIAN INITIAL EVALUATION ADULT - PERTINENT LABORATORY DATA
03-10    145  |  110<H>  |  13  ----------------------------<  99  3.8   |  28  |  0.67    Ca    8.2<L>      10 Mar 2023 06:15    TPro  5.3<L>  /  Alb  2.6<L>  /  TBili  0.5  /  DBili  x   /  AST  51<H>  /  ALT  25  /  AlkPhos  57  03-10  POCT Blood Glucose.: 160 mg/dL (03-10-23 @ 07:57)

## 2023-03-10 NOTE — DIETITIAN INITIAL EVALUATION ADULT - ORAL INTAKE PTA/DIET HISTORY
Patient Does Not Follow Diet @Home  Consumes 1 Meals a Day   Usually Cooks For Self  Does Take Vitamin/Supplements @Home (Multivitamin, CoEnzyme Q10, Vitamin B Complex)

## 2023-03-10 NOTE — CONSULT NOTE ADULT - ASSESSMENT
83 yo F with PMH of HTN, depression, hearing loss and colon CA (2002, s/p chemo) presents to [facility] with right hip pain after mechanical fall, found to have  right hip fracture. s/p ORIF. discharged to [facility] rehab on 3/9.     # RRT on 3/10 for syncope  # post-op anemia  - likely vasovagal due to straining for BM   - no orthostatic drop in BP  - Hb dropped significantly post-op. from 14 to 8 today  - repeat h/h 7.9 at 2 pm. give one unit of PRBC as BP soft and patient had syncope  - s/p NS bolus. continue NS@ 75 ml/hr. stop after one liter  - transfuse to keep h/h above 8  - hold amlodipine for now.   - check orthostasis post-IVF and in am.   - educated about postural hypotension and fall prevention    # Impaired gait and mobility  # Right Proximal Femur Fracture  - s/p ORIF IM nailing on 3/7  - comprehensive rehab  - pain control per primary  - fall precautions  - Ortho f/u OP      #acute blood loss anemia likely post op anemia  - baseline hgb 14  - hgb 8.0 in am, 7.9 now. give one unit of PRBC  - keep type and screen active.   - repeat h/h at 2 pm and in am  - transfuse to keep hb >8 or patient  symptomatic  - check FOBT  - PPI added  - repeat post-transfusion CBC in pm and am.     #Leukocytosis-resolved  - Likely reactive  - improved  - Monitor CBC    #Elevated AST-resolved  - Mildly elevated AST 69, ALT 42 wnl  - Monitor CMP    #HTN  - hold Amlodipine 5 mg daily. resume if BP >140 systolic and no OH    #Depression  - Continue Sertraline 100 mg daily    #VTE PPx  - lovenox     Thanks for allowing us to see this patient. Hospitalist service will continue to follow patient progress with you. please call with any question    d/w dr. Hill and Marie TOLENTINO

## 2023-03-11 LAB
HCT VFR BLD CALC: 28 % — LOW (ref 34.5–45)
HGB BLD-MCNC: 9.4 G/DL — LOW (ref 11.5–15.5)
MCHC RBC-ENTMCNC: 32.6 PG — SIGNIFICANT CHANGE UP (ref 27–34)
MCHC RBC-ENTMCNC: 33.6 GM/DL — SIGNIFICANT CHANGE UP (ref 32–36)
MCV RBC AUTO: 97.2 FL — SIGNIFICANT CHANGE UP (ref 80–100)
NRBC # BLD: 0 /100 WBCS — SIGNIFICANT CHANGE UP (ref 0–0)
PLATELET # BLD AUTO: 183 K/UL — SIGNIFICANT CHANGE UP (ref 150–400)
RBC # BLD: 2.88 M/UL — LOW (ref 3.8–5.2)
RBC # FLD: 16.7 % — HIGH (ref 10.3–14.5)
WBC # BLD: 7.71 K/UL — SIGNIFICANT CHANGE UP (ref 3.8–10.5)
WBC # FLD AUTO: 7.71 K/UL — SIGNIFICANT CHANGE UP (ref 3.8–10.5)

## 2023-03-11 PROCEDURE — 99232 SBSQ HOSP IP/OBS MODERATE 35: CPT

## 2023-03-11 RX ADMIN — Medication 975 MILLIGRAM(S): at 14:59

## 2023-03-11 RX ADMIN — Medication 975 MILLIGRAM(S): at 22:00

## 2023-03-11 RX ADMIN — Medication 975 MILLIGRAM(S): at 05:43

## 2023-03-11 RX ADMIN — Medication 975 MILLIGRAM(S): at 16:00

## 2023-03-11 RX ADMIN — Medication 975 MILLIGRAM(S): at 06:30

## 2023-03-11 RX ADMIN — SERTRALINE 100 MILLIGRAM(S): 25 TABLET, FILM COATED ORAL at 11:07

## 2023-03-11 RX ADMIN — ENOXAPARIN SODIUM 40 MILLIGRAM(S): 100 INJECTION SUBCUTANEOUS at 14:59

## 2023-03-11 RX ADMIN — Medication 975 MILLIGRAM(S): at 23:40

## 2023-03-11 RX ADMIN — PANTOPRAZOLE SODIUM 40 MILLIGRAM(S): 20 TABLET, DELAYED RELEASE ORAL at 05:44

## 2023-03-11 NOTE — PROGRESS NOTE ADULT - SUBJECTIVE AND OBJECTIVE BOX
Cc: Impaired mobility    HPI: Patient resting comfortably.    Pain controlled, no chest pain, no N/V, no Fevers/Chills. No other new ROS  Has been tolerating rehabilitation program.    acetaminophen     Tablet .. 975 milliGRAM(s) Oral every 8 hours  enoxaparin Injectable 40 milliGRAM(s) SubCutaneous every 24 hours  oxyCODONE    IR 5 milliGRAM(s) Oral every 6 hours PRN  pantoprazole    Tablet 40 milliGRAM(s) Oral before breakfast  polyethylene glycol 3350 17 Gram(s) Oral daily  senna 2 Tablet(s) Oral at bedtime PRN  sertraline 100 milliGRAM(s) Oral daily      T(C): 36.6 (03-10-23 @ 22:55), Max: 36.9 (03-10-23 @ 20:55)  HR: 64 (03-10-23 @ 22:55) (64 - 84)  BP: 133/64 (03-10-23 @ 22:55) (131/72 - 134/68)  RR: 14 (03-10-23 @ 22:55) (14 - 16)  SpO2: 94% (03-10-23 @ 22:55) (93% - 94%)    In NAD  HEENT- EOMI  Heart- No cyanosis   Lungs- No respiratory distress   Abd- + BS, NT  Ext- No calf pain  Neuro- Exam unchanged                          9.4    7.71  )-----------( 183      ( 11 Mar 2023 05:45 )             28.0     03-10    145  |  110<H>  |  13  ----------------------------<  99  3.8   |  28  |  0.67    Ca    8.2<L>      10 Mar 2023 06:15    TPro  5.3<L>  /  Alb  2.6<L>  /  TBili  0.5  /  DBili  x   /  AST  51<H>  /  ALT  25  /  AlkPhos  57  03-10        Imp: Patient with diagnosis of right hip fracture s/p ORIF admitted for comprehensive acute rehabilitation.    Plan:  - Continue PT/OT/SLP  - DVT prophylaxis - Lovenox   - Skin- Turn q2h, check skin daily  - Continue current medications; patient medically stable.   -Active issues-   -Anemia - hemoglobin improving, monitor  -Pain -  tylenol, oxycodone prn  -Depression - sertraline   - Patient is stable to continue current rehabilitation program.

## 2023-03-12 LAB
ALBUMIN SERPL ELPH-MCNC: 2.6 G/DL — LOW (ref 3.3–5)
ALP SERPL-CCNC: 118 U/L — SIGNIFICANT CHANGE UP (ref 40–120)
ALT FLD-CCNC: 136 U/L — HIGH (ref 10–45)
ANION GAP SERPL CALC-SCNC: 6 MMOL/L — SIGNIFICANT CHANGE UP (ref 5–17)
AST SERPL-CCNC: 162 U/L — HIGH (ref 10–40)
BILIRUB SERPL-MCNC: 0.8 MG/DL — SIGNIFICANT CHANGE UP (ref 0.2–1.2)
BUN SERPL-MCNC: 15 MG/DL — SIGNIFICANT CHANGE UP (ref 7–23)
CALCIUM SERPL-MCNC: 8.3 MG/DL — LOW (ref 8.4–10.5)
CHLORIDE SERPL-SCNC: 109 MMOL/L — HIGH (ref 96–108)
CO2 SERPL-SCNC: 28 MMOL/L — SIGNIFICANT CHANGE UP (ref 22–31)
CREAT SERPL-MCNC: 0.72 MG/DL — SIGNIFICANT CHANGE UP (ref 0.5–1.3)
EGFR: 82 ML/MIN/1.73M2 — SIGNIFICANT CHANGE UP
GLUCOSE SERPL-MCNC: 93 MG/DL — SIGNIFICANT CHANGE UP (ref 70–99)
HCT VFR BLD CALC: 29.8 % — LOW (ref 34.5–45)
HGB BLD-MCNC: 9.6 G/DL — LOW (ref 11.5–15.5)
MCHC RBC-ENTMCNC: 31.8 PG — SIGNIFICANT CHANGE UP (ref 27–34)
MCHC RBC-ENTMCNC: 32.2 GM/DL — SIGNIFICANT CHANGE UP (ref 32–36)
MCV RBC AUTO: 98.7 FL — SIGNIFICANT CHANGE UP (ref 80–100)
NRBC # BLD: 0 /100 WBCS — SIGNIFICANT CHANGE UP (ref 0–0)
OB PNL STL: NEGATIVE — SIGNIFICANT CHANGE UP
PLATELET # BLD AUTO: 210 K/UL — SIGNIFICANT CHANGE UP (ref 150–400)
POTASSIUM SERPL-MCNC: 4.2 MMOL/L — SIGNIFICANT CHANGE UP (ref 3.5–5.3)
POTASSIUM SERPL-SCNC: 4.2 MMOL/L — SIGNIFICANT CHANGE UP (ref 3.5–5.3)
PROT SERPL-MCNC: 5.6 G/DL — LOW (ref 6–8.3)
RBC # BLD: 3.02 M/UL — LOW (ref 3.8–5.2)
RBC # FLD: 16.3 % — HIGH (ref 10.3–14.5)
SODIUM SERPL-SCNC: 143 MMOL/L — SIGNIFICANT CHANGE UP (ref 135–145)
WBC # BLD: 7.06 K/UL — SIGNIFICANT CHANGE UP (ref 3.8–10.5)
WBC # FLD AUTO: 7.06 K/UL — SIGNIFICANT CHANGE UP (ref 3.8–10.5)

## 2023-03-12 PROCEDURE — 99232 SBSQ HOSP IP/OBS MODERATE 35: CPT

## 2023-03-12 RX ADMIN — Medication 975 MILLIGRAM(S): at 21:15

## 2023-03-12 RX ADMIN — Medication 975 MILLIGRAM(S): at 14:35

## 2023-03-12 RX ADMIN — ENOXAPARIN SODIUM 40 MILLIGRAM(S): 100 INJECTION SUBCUTANEOUS at 13:10

## 2023-03-12 RX ADMIN — Medication 975 MILLIGRAM(S): at 22:00

## 2023-03-12 RX ADMIN — PANTOPRAZOLE SODIUM 40 MILLIGRAM(S): 20 TABLET, DELAYED RELEASE ORAL at 05:47

## 2023-03-12 RX ADMIN — Medication 975 MILLIGRAM(S): at 05:47

## 2023-03-12 RX ADMIN — Medication 975 MILLIGRAM(S): at 06:52

## 2023-03-12 RX ADMIN — Medication 975 MILLIGRAM(S): at 13:08

## 2023-03-12 RX ADMIN — SERTRALINE 100 MILLIGRAM(S): 25 TABLET, FILM COATED ORAL at 11:26

## 2023-03-12 NOTE — PROGRESS NOTE ADULT - SUBJECTIVE AND OBJECTIVE BOX
Cc: Gait dysfunction    HPI: Patient with no new medical issues today.  Denies any shortness of breath.  Denies any new bleeding.    Pain controlled, no chest pain, no N/V, no Fevers/Chills. No other new ROS  Has been tolerating rehabilitation program.    acetaminophen     Tablet .. 975 milliGRAM(s) Oral every 8 hours  enoxaparin Injectable 40 milliGRAM(s) SubCutaneous every 24 hours  oxyCODONE    IR 5 milliGRAM(s) Oral every 6 hours PRN  pantoprazole    Tablet 40 milliGRAM(s) Oral before breakfast  polyethylene glycol 3350 17 Gram(s) Oral daily  senna 2 Tablet(s) Oral at bedtime PRN  sertraline 100 milliGRAM(s) Oral daily      T(C): 37.9 (03-11-23 @ 21:00), Max: 37.9 (03-11-23 @ 20:57)  HR: 66 (03-11-23 @ 20:57) (66 - 66)  BP: 137/69 (03-11-23 @ 20:57) (137/69 - 137/69)  RR: 18 (03-11-23 @ 20:57) (18 - 18)  SpO2: 94% (03-11-23 @ 20:57) (94% - 94%)    In NAD  HEENT- EOMI  Heart- No cyanosis   Lungs- No respiratory distress   Abd- + BS, NT  Ext- No calf pain, no new echymosis or hematoma   Neuro- Exam unchanged                          9.6    7.06  )-----------( 210      ( 12 Mar 2023 05:45 )             29.8     03-12    143  |  109<H>  |  15  ----------------------------<  93  4.2   |  28  |  0.72    Ca    8.3<L>      12 Mar 2023 05:45    TPro  5.6<L>  /  Alb  2.6<L>  /  TBili  0.8  /  DBili  x   /  AST  162<H>  /  ALT  136<H>  /  AlkPhos  118  03-12      Imp: Patient with diagnosis of right hip fracture s/p ORIF admitted for comprehensive acute rehabilitation.    Plan:  - Continue PT/OT/SLP  - DVT prophylaxis - Lovenox   - Skin- Turn q2h, check skin daily  - Continue current medications; patient medically stable.   -Active issues-   -Anemia - hemoglobin stable, continue to monitor   -Pain -  tylenol, oxycodone prn  -Depression - sertraline   - Patient is stable to continue current rehabilitation program.

## 2023-03-13 LAB
ALBUMIN SERPL ELPH-MCNC: 2.5 G/DL — LOW (ref 3.3–5)
ALP SERPL-CCNC: 142 U/L — HIGH (ref 40–120)
ALT FLD-CCNC: 172 U/L — HIGH (ref 10–45)
ANION GAP SERPL CALC-SCNC: 7 MMOL/L — SIGNIFICANT CHANGE UP (ref 5–17)
AST SERPL-CCNC: 178 U/L — HIGH (ref 10–40)
BILIRUB SERPL-MCNC: 0.7 MG/DL — SIGNIFICANT CHANGE UP (ref 0.2–1.2)
BUN SERPL-MCNC: 17 MG/DL — SIGNIFICANT CHANGE UP (ref 7–23)
CALCIUM SERPL-MCNC: 8.5 MG/DL — SIGNIFICANT CHANGE UP (ref 8.4–10.5)
CHLORIDE SERPL-SCNC: 108 MMOL/L — SIGNIFICANT CHANGE UP (ref 96–108)
CO2 SERPL-SCNC: 26 MMOL/L — SIGNIFICANT CHANGE UP (ref 22–31)
CREAT SERPL-MCNC: 0.68 MG/DL — SIGNIFICANT CHANGE UP (ref 0.5–1.3)
EGFR: 86 ML/MIN/1.73M2 — SIGNIFICANT CHANGE UP
GLUCOSE SERPL-MCNC: 99 MG/DL — SIGNIFICANT CHANGE UP (ref 70–99)
HCT VFR BLD CALC: 30.1 % — LOW (ref 34.5–45)
HGB BLD-MCNC: 9.9 G/DL — LOW (ref 11.5–15.5)
MCHC RBC-ENTMCNC: 32.9 GM/DL — SIGNIFICANT CHANGE UP (ref 32–36)
MCHC RBC-ENTMCNC: 32.9 PG — SIGNIFICANT CHANGE UP (ref 27–34)
MCV RBC AUTO: 100 FL — SIGNIFICANT CHANGE UP (ref 80–100)
NRBC # BLD: 0 /100 WBCS — SIGNIFICANT CHANGE UP (ref 0–0)
PLATELET # BLD AUTO: 238 K/UL — SIGNIFICANT CHANGE UP (ref 150–400)
POTASSIUM SERPL-MCNC: 4.4 MMOL/L — SIGNIFICANT CHANGE UP (ref 3.5–5.3)
POTASSIUM SERPL-SCNC: 4.4 MMOL/L — SIGNIFICANT CHANGE UP (ref 3.5–5.3)
PROT SERPL-MCNC: 5.9 G/DL — LOW (ref 6–8.3)
RBC # BLD: 3.01 M/UL — LOW (ref 3.8–5.2)
RBC # FLD: 16.2 % — HIGH (ref 10.3–14.5)
SODIUM SERPL-SCNC: 141 MMOL/L — SIGNIFICANT CHANGE UP (ref 135–145)
WBC # BLD: 6.21 K/UL — SIGNIFICANT CHANGE UP (ref 3.8–10.5)
WBC # FLD AUTO: 6.21 K/UL — SIGNIFICANT CHANGE UP (ref 3.8–10.5)

## 2023-03-13 PROCEDURE — 76700 US EXAM ABDOM COMPLETE: CPT | Mod: 26

## 2023-03-13 PROCEDURE — 99233 SBSQ HOSP IP/OBS HIGH 50: CPT

## 2023-03-13 PROCEDURE — 99232 SBSQ HOSP IP/OBS MODERATE 35: CPT

## 2023-03-13 RX ORDER — TRAMADOL HYDROCHLORIDE 50 MG/1
25 TABLET ORAL
Refills: 0 | Status: DISCONTINUED | OUTPATIENT
Start: 2023-03-13 | End: 2023-03-20

## 2023-03-13 RX ORDER — SODIUM CHLORIDE 9 MG/ML
500 INJECTION INTRAMUSCULAR; INTRAVENOUS; SUBCUTANEOUS ONCE
Refills: 0 | Status: COMPLETED | OUTPATIENT
Start: 2023-03-13 | End: 2023-03-13

## 2023-03-13 RX ORDER — SODIUM CHLORIDE 9 MG/ML
500 INJECTION INTRAMUSCULAR; INTRAVENOUS; SUBCUTANEOUS ONCE
Refills: 0 | Status: DISCONTINUED | OUTPATIENT
Start: 2023-03-13 | End: 2023-03-13

## 2023-03-13 RX ADMIN — ENOXAPARIN SODIUM 40 MILLIGRAM(S): 100 INJECTION SUBCUTANEOUS at 14:58

## 2023-03-13 RX ADMIN — SODIUM CHLORIDE 1000 MILLILITER(S): 9 INJECTION INTRAMUSCULAR; INTRAVENOUS; SUBCUTANEOUS at 11:47

## 2023-03-13 RX ADMIN — TRAMADOL HYDROCHLORIDE 25 MILLIGRAM(S): 50 TABLET ORAL at 18:41

## 2023-03-13 RX ADMIN — Medication 975 MILLIGRAM(S): at 06:40

## 2023-03-13 RX ADMIN — PANTOPRAZOLE SODIUM 40 MILLIGRAM(S): 20 TABLET, DELAYED RELEASE ORAL at 06:25

## 2023-03-13 RX ADMIN — Medication 975 MILLIGRAM(S): at 05:59

## 2023-03-13 NOTE — PROGRESS NOTE ADULT - SUBJECTIVE AND OBJECTIVE BOX
Cc: Gait dysfunction    HPI: Patient seen and examined at bedside. No acute events overnight. Feels well after transfusion over weekend.   Pain overall controlled, no chest pain, no N/V, no Fevers/Chills. No other new ROS  Has been tolerating rehabilitation program.    enoxaparin Injectable 40 milliGRAM(s) SubCutaneous every 24 hours  oxyCODONE    IR 5 milliGRAM(s) Oral every 6 hours PRN  pantoprazole    Tablet 40 milliGRAM(s) Oral before breakfast  polyethylene glycol 3350 17 Gram(s) Oral daily  senna 2 Tablet(s) Oral at bedtime PRN  sertraline 100 milliGRAM(s) Oral daily  traMADol 25 milliGRAM(s) Oral two times a day      T(C): 36.6 (03-13-23 @ 08:50), Max: 36.8 (03-12-23 @ 19:50)  HR: 70 (03-13-23 @ 09:35) (70 - 100)  BP: 123/76 (03-13-23 @ 08:50) (123/76 - 128/72)  RR: 16 (03-13-23 @ 08:50) (16 - 16)  SpO2: 97% (03-13-23 @ 08:50) (96% - 97%)    In NAD  HEENT- EOMI  Heart- S1S2  Lungs- CTA bl.  Abd- + BS, NT  Ext- No calf pain  Neuro- Exam unchanged    ASSESSMENT/PLAN: 81 yo F with PMH of HTN, depression, hearing loss and colon CA (2002, s/p chemo) presents to [facility] ED on 3/7/23 with R hip pain after a mechanical fall at home while watering plants.  X-rays showed a R IT hip fx.  BP elevated to 179/82, WBC 13.64.  EKG showed sinus inderjit at 58 bpm,  S/p ORIF R IT hip fx with locking IM jailene on 3/7 performed by Dr. Rojas w/o complications.  WBAT, LMWH VTE ppx.  Hgb downtrended due to postop blood loss- asymptomatic.  Leucocytosis resolved.  Transferred to the [address] [place] [address] acute rehab unit at [facility] on 3/9.    #S/p comminuted, displaced and angulated R IT hip fx due to mechanical fall 3/7/23  -s/p ORIF with IM jaileen by Dr. Rojas on 3/7.  -WBAT  -Pain Mgmt: Tylenol PRN  -Local wound care per ortho surgery  3/10: stable, continue rehab.  R hip incision x 3 with Aquacel dsgs (kept in place), old mild serosanguinous drainage visible underneath  3/13: Aquacel dressing dry, intact    #Impaired ADLs/mobility, disposition/baseline (home: pt lives alone in private house, 3 shelly w/rail, 1 flight to br.  Pt is independent w/ambulation and ADL's w/o AD at baseline, +drives/shops.  Has .  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   as of Dec 2021.  Daughter in [place], son in [place], son in [place].  Daughter to be visiting pt here)  - Fxnal status during acute hospitalization: min a  - Begin multidisciplinary intensive rehab program with team conference qwk  3/10: undergoing acute inpatient rehabilitation therapy evals.  Vasovagal/orthostatic hypotension pre-syncopal episode this AM; doing better after IV and po fluids.  TEDs added, amlodipine d/c'ed.  Rec therapy added given pt avid classical .    #Acute blood loss anemia likely post op anemia  - Presenting Hgb 14.4 on 3/7/23 in ED  -  mls in OR 3/7/23  - monitor cbc  3/10: Hgb 8.0 this AM, no signs of bleeding, continue to monitor.  3/13: S/p transfusion over weekend, Hb stable at 9.9    #Cardiovascular/ h/o HTN  - sinus inderjit on presentation  - Transferred to rehab on amlodipine 5 mg daily  3/10: amlodipine d/c'ed by hospitalist after pre-syncopal episode this AM.  TEDs added.  No indication of cardiac event this AM: troponins neg.  ECG wet read showing NSR with flipped septal Ts (no significant change vs 3/7); await formal read.      #VTE PPx  - Enoxaparin, routine mechanical measures  - monitor for clots and adverse efx of ppx measures  3/10 plt 146 this AM, continue to monitor, elisabeth while on LMWH  3/13: Plt 238 this AM, continue to monitor    #Leukocytosis-resolved  - Likely reactive, no evidence of infection  - Monitor CBC  3/10: aleucocytotic  3/13: aleucocytotic    #Elevated AST  - Monitor CMP  3/10: AST 51, improving, clinically stable  3/13: ALT/AST elevated over weekend, stopped Tylenol, will give Tramadol if needed for pain, hospitalist input appreciated    #Depression  - on Sertraline 100 mg daily  3/10: clinically stable  3/13: clinically stable    #GI/Bowel Mgmt  - Senna,  Miralax PRN    #/Bladder Mgmt  - Voiding volitionally, Primafit added for nights so pt doesn't have to worry about drinking too much fluid and having to get up to void at night       Cc: Gait dysfunction    HPI: Patient seen and examined at bedside. No acute events overnight. Feels well after transfusion over weekend.   Pain overall controlled, no chest pain, no N/V, no Fevers/Chills. No other new ROS  Has been tolerating rehabilitation program.    enoxaparin Injectable 40 milliGRAM(s) SubCutaneous every 24 hours  oxyCODONE    IR 5 milliGRAM(s) Oral every 6 hours PRN  pantoprazole    Tablet 40 milliGRAM(s) Oral before breakfast  polyethylene glycol 3350 17 Gram(s) Oral daily  senna 2 Tablet(s) Oral at bedtime PRN  sertraline 100 milliGRAM(s) Oral daily  traMADol 25 milliGRAM(s) Oral two times a day    03-13    141  |  108  |  17  ----------------------------<  99  4.4   |  26  |  0.68    Ca    8.5      13 Mar 2023 06:30    TPro  5.9<L>  /  Alb  2.5<L>  /  TBili  0.7  /  DBili  x   /  AST  178<H>  /  ALT  172<H>  /  AlkPhos  142<H>  03-13                          9.9    6.21  )-----------( 238      ( 13 Mar 2023 06:30 )             30.1       T(C): 36.6 (03-13-23 @ 08:50), Max: 36.8 (03-12-23 @ 19:50)  HR: 70 (03-13-23 @ 09:35) (70 - 100)  BP: 123/76 (03-13-23 @ 08:50) (123/76 - 128/72)  RR: 16 (03-13-23 @ 08:50) (16 - 16)  SpO2: 97% (03-13-23 @ 08:50) (96% - 97%)    In NAD  HEENT- EOMI  Heart- S1S2  Lungs- CTA bl.  Abd- + BS, NT  Ext- No calf pain  Neuro- Exam unchanged    ASSESSMENT/PLAN: 81 yo F with PMH of HTN, depression, hearing loss and colon CA (2002, s/p chemo) presents to [facility] ED on 3/7/23 with R hip pain after a mechanical fall at home while watering plants.  X-rays showed a R IT hip fx.  BP elevated to 179/82, WBC 13.64.  EKG showed sinus inderjit at 58 bpm,  S/p ORIF R IT hip fx with locking IM jailene on 3/7 performed by Dr. Rojas w/o complications.  WBAT, LMWH VTE ppx.  Hgb downtrended due to postop blood loss- asymptomatic.  Leucocytosis resolved.  Transferred to the [address] [place] [address] acute rehab unit at [facility] on 3/9.    #S/p comminuted, displaced and angulated R IT hip fx due to mechanical fall 3/7/23  -s/p ORIF with IM jailene by Dr. Rojas on 3/7.  -WBAT  -Pain Mgmt: Tylenol PRN  -Local wound care per ortho surgery  3/10: stable, continue rehab.  R hip incision x 3 with Aquacel dsgs (kept in place), old mild serosanguinous drainage visible underneath  3/13: Aquacel dressing dry, intact    #Impaired ADLs/mobility, disposition/baseline (home: pt lives alone in private house, 3 shelly w/rail, 1 flight to .  Pt is independent w/ambulation and ADL's w/o AD at baseline, +drives/shops.  Has .  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   as of Dec 2021.  Daughter in [place], son in [place], son in [place].  Daughter to be visiting pt here)  - Fxnal status during acute hospitalization: min a  - Begin multidisciplinary intensive rehab program with team conference qwk  3/10: undergoing acute inpatient rehabilitation therapy evals.  Vasovagal/orthostatic hypotension pre-syncopal episode this AM; doing better after IV and po fluids.  TEDs added, amlodipine d/c'ed.  Rec therapy added given pt avid classical .    #Acute blood loss anemia likely post op anemia  - Presenting Hgb 14.4 on 3/7/23 in ED  -  mls in OR 3/7/23  - monitor cbc  3/10: Hgb 8.0 this AM, no signs of bleeding, continue to monitor.  3/13: S/p transfusion over weekend, Hb stable at 9.9    #Cardiovascular/ h/o HTN  - sinus inderjit on presentation  - Transferred to rehab on amlodipine 5 mg daily  3/10: amlodipine d/c'ed by hospitalist after pre-syncopal episode this AM.  TEDs added.  No indication of cardiac event this AM: troponins neg.  ECG wet read showing NSR with flipped septal Ts (no significant change vs 3/7); await formal read.      #VTE PPx  - Enoxaparin, routine mechanical measures  - monitor for clots and adverse efx of ppx measures  3/10 plt 146 this AM, continue to monitor, elisabeth while on LMWH  3/13: Plt 238 this AM, continue to monitor    #Leukocytosis-resolved  - Likely reactive, no evidence of infection  - Monitor CBC  3/10: aleucocytotic  3/13: aleucocytotic    #Elevated AST  - Monitor CMP  3/10: AST 51, improving, clinically stable  3/13: ALT/AST elevated over weekend, stopped Tylenol, will give Tramadol if needed for pain, hospitalist input appreciated    #Depression  - on Sertraline 100 mg daily  3/10: clinically stable  3/13: clinically stable    #GI/Bowel Mgmt  - Senna,  Miralax PRN    #/Bladder Mgmt  - Voiding volitionally, Primafit added for nights so pt doesn't have to worry about drinking too much fluid and having to get up to void at night

## 2023-03-13 NOTE — PROGRESS NOTE ADULT - ASSESSMENT
83 yo F with PMH of HTN, depression, hearing loss and colon CA (2002, s/p chemo) presents to [facility] with right hip pain after mechanical fall, found to have  right hip fracture. s/p ORIF. discharged to [facility] rehab on 3/9- pt/ot/dvt ppx    # Right Proximal Femur Fracture  - s/p ORIF IM nailing on 3/7    # RRT on 3/10 for syncope  # post-op anemia  - likely vasovagal due to straining for BM   - no orthostatic drop in BP  - s/p one unit of PRBC as BP soft and patient had syncope, Hb now 9.9  - hold amlodipine for now.   - check orthostasis post-IVF and in am.       #Leukocytosis-resolved  - Likely reactive  - improved  - Monitor CBC    #transaminitis- monitor  - liver sono  - avoid hepatotoxic agents  - monitor    #HTN  - hold Amlodipine     #Depression  - Continue Sertraline     #VTE PPx  - Lovenox     will follow  d/w rehab team  time spent ine/m visit- 50 min

## 2023-03-13 NOTE — PROGRESS NOTE ADULT - SUBJECTIVE AND OBJECTIVE BOX
82y old  Female who presents with a chief complaint of Displaced intertrochanteric fracture of right femur, initial encounter for closed fracture    Vital Signs Last 24 Hrs  T(C): 36.6 (13 Mar 2023 08:50), Max: 36.8 (12 Mar 2023 19:50)  T(F): 97.8 (13 Mar 2023 08:50), Max: 98.3 (12 Mar 2023 19:50)  HR: 70 (13 Mar 2023 09:35) (70 - 100)  BP: 123/76 (13 Mar 2023 08:50) (123/76 - 128/72)  BP(mean): --  RR: 16 (13 Mar 2023 08:50) (16 - 16)  SpO2: 97% (13 Mar 2023 08:50) (96% - 97%)    Parameters below as of 13 Mar 2023 08:50  Patient On (Oxygen Delivery Method): room air        GENERAL- NAD  EAR/NOSE/MOUTH/THROAT - no pharyngeal exudates, no oral leisions,  MMM  EYES- LENY, conjunctiva and Sclera clear  NECK- supple  RESPIRATORY-  clear to auscultation bilaterally, non laboured breathing  CARDIOVASCULAR - SIS2, RRR  GI - soft NT BS present  EXTREMITIES- no pedal edema, RLE swelling around hip and knee  NEUROLOGY- no gross focal deficits  PSYCHIATRY- AAO X 3                    9.9                  141  | 26   | 17           6.21  >-----------< 238     ------------------------< 99                    30.1                 4.4  | 108  | 0.68                                         Ca 8.5   Mg x     Ph x            MEDICATIONS  (STANDING):  enoxaparin Injectable 40 milliGRAM(s) SubCutaneous every 24 hours  pantoprazole  Tablet 40 milliGRAM(s) Oral before breakfast  polyethylene glycol 3350 17 Gram(s) Oral daily  sertraline 100 milliGRAM(s) Oral daily  traMADol 25 milliGRAM(s) Oral two times a day    MEDICATIONS  (PRN):  oxyCODONE    IR 5 milliGRAM(s) Oral every 6 hours PRN Severe Pain (7 - 10)  senna 2 Tablet(s) Oral at bedtime PRN Constipation

## 2023-03-14 PROCEDURE — 99232 SBSQ HOSP IP/OBS MODERATE 35: CPT

## 2023-03-14 RX ADMIN — SERTRALINE 100 MILLIGRAM(S): 25 TABLET, FILM COATED ORAL at 11:10

## 2023-03-14 RX ADMIN — ENOXAPARIN SODIUM 40 MILLIGRAM(S): 100 INJECTION SUBCUTANEOUS at 14:36

## 2023-03-14 RX ADMIN — TRAMADOL HYDROCHLORIDE 25 MILLIGRAM(S): 50 TABLET ORAL at 18:33

## 2023-03-14 RX ADMIN — OXYCODONE HYDROCHLORIDE 5 MILLIGRAM(S): 5 TABLET ORAL at 12:00

## 2023-03-14 RX ADMIN — PANTOPRAZOLE SODIUM 40 MILLIGRAM(S): 20 TABLET, DELAYED RELEASE ORAL at 05:23

## 2023-03-14 RX ADMIN — TRAMADOL HYDROCHLORIDE 25 MILLIGRAM(S): 50 TABLET ORAL at 17:48

## 2023-03-14 RX ADMIN — TRAMADOL HYDROCHLORIDE 25 MILLIGRAM(S): 50 TABLET ORAL at 06:00

## 2023-03-14 RX ADMIN — TRAMADOL HYDROCHLORIDE 25 MILLIGRAM(S): 50 TABLET ORAL at 05:20

## 2023-03-14 RX ADMIN — OXYCODONE HYDROCHLORIDE 5 MILLIGRAM(S): 5 TABLET ORAL at 11:17

## 2023-03-14 NOTE — PROGRESS NOTE ADULT - ASSESSMENT
81 yo F with PMH of HTN, depression, hearing loss and colon CA (2002, s/p chemo) presents to [facility] with right hip pain after mechanical fall, found to have  right hip fracture. s/p ORIF. discharged to [facility] rehab on 3/9- pt/ot/dvt ppx    # Right Proximal Femur Fracture  - s/p ORIF IM nailing on 3/7    # RRT on 3/10 for syncope  # post-op anemia  - likely vasovagal due to straining for BM   - no orthostatic drop in BP  - s/p one unit of PRBC as BP soft and patient had syncope, Hb now 9.9  - hold amlodipine for now.   - check orthostasis post-IVF and in am.     #Leukocytosis-resolved  - Likely reactive  - improved  - Monitor CBC    #transaminitis- monitor  - US Abdomen Complete (US Abdomen Complete .) (03.13.23 @ 13:19)  Liver: Within normal limits. Bile ducts: Normal caliber. Common bile duct measures 3 mm. Gallbladder: Within normal limits.  - avoid hepatotoxic agents  - monitor    #HTN  - hold Amlodipine     #Depression  - Continue Sertraline     #VTE PPx  - Lovenox     will follow  d/w rehab team

## 2023-03-14 NOTE — PROGRESS NOTE ADULT - SUBJECTIVE AND OBJECTIVE BOX
82y old  Female who presents with a chief complaint of Displaced intertrochanteric fracture of right femur, initial encounter for closed fracture    seen at the bedside, no new complaints no overnight issues    Vital Signs Last 24 Hrs  T(C): 36.7 (14 Mar 2023 08:21), Max: 36.7 (14 Mar 2023 08:21)  T(F): 98.1 (14 Mar 2023 08:21), Max: 98.1 (14 Mar 2023 08:21)  HR: 85 (14 Mar 2023 08:21) (85 - 85)  BP: 142/81 (14 Mar 2023 08:21) (142/81 - 142/81)  BP(mean): --  RR: 16 (14 Mar 2023 08:21) (16 - 16)  SpO2: 96% (14 Mar 2023 08:21) (96% - 96%)    Parameters below as of 14 Mar 2023 08:21  Patient On (Oxygen Delivery Method): room air      GENERAL- NAD  EAR/NOSE/MOUTH/THROAT - no pharyngeal exudates, no oral lesions  MMM  EYES- LENY, conjunctiva and Sclera clear  NECK- supple  RESPIRATORY-  clear to auscultation bilaterally, non laboured breathing  CARDIOVASCULAR - SIS2, RRR  GI - soft NT BS present  EXTREMITIES- no pedal edema, RLE swelling around hip and knee  NEUROLOGY- no gross focal deficits  PSYCHIATRY- AAO X 3                  9.9                  141  | 26   | 17           6.21  >-----------< 238     ------------------------< 99                    30.1                 4.4  | 108  | 0.68                                         Ca 8.5   Mg x     Ph x            MEDICATIONS  (STANDING):  enoxaparin Injectable 40 milliGRAM(s) SubCutaneous every 24 hours  pantoprazole  Tablet 40 milliGRAM(s) Oral before breakfast  polyethylene glycol 3350 17 Gram(s) Oral daily  sertraline 100 milliGRAM(s) Oral daily  traMADol 25 milliGRAM(s) Oral two times a day    MEDICATIONS  (PRN):  oxyCODONE    IR 5 milliGRAM(s) Oral every 6 hours PRN Severe Pain (7 - 10)  senna 2 Tablet(s) Oral at bedtime PRN Constipation

## 2023-03-14 NOTE — PROGRESS NOTE ADULT - SUBJECTIVE AND OBJECTIVE BOX
Cc: Gait dysfunction    HPI: Patient seen and examined at bedside. No acute events overnight. Pain better controlled with Tramadol, took one last night. Denies any side effects.   No chest pain, no N/V, no Fevers/Chills. No other new ROS  Has been tolerating rehabilitation program.    enoxaparin Injectable 40 milliGRAM(s) SubCutaneous every 24 hours  oxyCODONE    IR 5 milliGRAM(s) Oral every 6 hours PRN  pantoprazole    Tablet 40 milliGRAM(s) Oral before breakfast  polyethylene glycol 3350 17 Gram(s) Oral daily  senna 2 Tablet(s) Oral at bedtime PRN  sertraline 100 milliGRAM(s) Oral daily  traMADol 25 milliGRAM(s) Oral two times a day      T(C): 36.7 (03-14-23 @ 08:21), Max: 36.7 (03-14-23 @ 08:21)  HR: 85 (03-14-23 @ 08:21) (85 - 85)  BP: 142/81 (03-14-23 @ 08:21) (142/81 - 142/81)  RR: 16 (03-14-23 @ 08:21) (16 - 16)  SpO2: 96% (03-14-23 @ 08:21) (96% - 96%)    CC: [id number] EXAM:  US ABDOMEN COMPLETE   ORDERED BY: NU GOMES     PROCEDURE DATE:  03/13/2023          INTERPRETATION:  CLINICAL INFORMATION: Elevated liver enzymes    COMPARISON: No prior abdominal ultrasound is available for comparison.   Reference is made with a previous abdominal CT dated 7/4/2017.    TECHNIQUE: Sonography of the abdomen.    FINDINGS:  Liver: Within normal limits.  Bile ducts: Normal caliber. Common bile duct measures 3 mm.  Gallbladder: Within normal limits.  Pancreas: Visualized portions are within normal limits.  Spleen: 7.3 cm. Within normal limits.  Right kidney: 11.0 cm. No hydronephrosis. 0.9 cm nonobstructive calculus   in the right kidney. Peripelvic right renal cysts are again seen.  Left kidney: 10.7 cm. No hydronephrosis. Peripelvic left renal cysts are   again seen.  Ascites: None.  Aorta and IVC: Visualized portions are within normal limits.    Small right pleural effusion.    IMPRESSION:  Bilateral peripelvic renal cysts. Small nonobstructive left renal   calculus.    --- End of Report ---            KAYLEE COELHO MD; Attending Radiologist      In NAD  HEENT- EOMI  Heart- S1S2  Lungs- CTA bl.  Abd- + BS, NT  Ext- No calf pain  Neuro- Exam unchanged    ASSESSMENT/PLAN: 81 yo F with PMH of HTN, depression, hearing loss and colon CA (2002, s/p chemo) presents to [facility] ED on 3/7/23 with R hip pain after a mechanical fall at home while watering plants.  X-rays showed a R IT hip fx.  BP elevated to 179/82, WBC 13.64.  EKG showed sinus inderjit at 58 bpm,  S/p ORIF R IT hip fx with locking IM jailene on 3/7 performed by Dr. Rojas w/o complications.  WBAT, LMWH VTE ppx.  Hgb downtrended due to postop blood loss- asymptomatic.  Leucocytosis resolved.  Transferred to the [address] [place] [address] acute rehab unit at [facility] on 3/9.    #S/p comminuted, displaced and angulated R IT hip fx due to mechanical fall 3/7/23  -s/p ORIF with IM jailene by Dr. Rojas on 3/7.  -WBAT  -Pain Mgmt: Tylenol PRN  -Local wound care per ortho surgery  3/10: stable, continue rehab.  R hip incision x 3 with Aquacel dsgs (kept in place), old mild serosanguinous drainage visible underneath  3/13-14: Aquacel dressing dry, intact    #Impaired ADLs/mobility, disposition/baseline (home: pt lives alone in private house, 3 shelly w/rail, 1 flight to .  Pt is independent w/ambulation and ADL's w/o AD at baseline, +drives/shops.  Has .  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   as of Dec 2021.  Daughter in [place], son in [place], son in [place].  Daughter to be visiting pt here)  - Fxnal status during acute hospitalization: min a  - Begin multidisciplinary intensive rehab program with team conference qwk  3/10: undergoing acute inpatient rehabilitation therapy evals.  Vasovagal/orthostatic hypotension pre-syncopal episode this AM; doing better after IV and po fluids.  TEDs added, amlodipine d/c'ed.  Rec therapy added given pt avid classical .    #Acute blood loss anemia likely post op anemia  - Presenting Hgb 14.4 on 3/7/23 in ED  -  mls in OR 3/7/23  - monitor cbc  3/10: Hgb 8.0 this AM, no signs of bleeding, continue to monitor.  3/13: S/p transfusion over weekend, Hb stable at 9.9  3/14: no signs of bleeding, continue to monitor    #Cardiovascular/ h/o HTN  - sinus inderjit on presentation  - Transferred to rehab on amlodipine 5 mg daily  3/10: amlodipine d/c'ed by hospitalist after pre-syncopal episode this AM.  TEDs added.  No indication of cardiac event this AM: troponins neg.  ECG wet read showing NSR with flipped septal Ts (no significant change vs 3/7); await formal read.    3/14: stable, BP WNL, HR controlled    #VTE PPx  - Enoxaparin, routine mechanical measures  - monitor for clots and adverse efx of ppx measures  3/10 plt 146 this AM, continue to monitor, elisabeth while on LMWH  3/13: Plt 238 this AM, continue to monitor    #Leukocytosis-resolved  - Likely reactive, no evidence of infection  - Monitor CBC  3/10: aleucocytotic  3/13: aleucocytotic    #Elevated AST  - Monitor CMP  3/10: AST 51, improving, clinically stable  3/13: ALT/AST elevated over weekend, stopped Tylenol, will give Tramadol if needed for pain, hospitalist input appreciated  3/14: US Abdomen showed no significant findings of liver, she will f/u with PCP regarding renal cysts as OP    #Depression  - on Sertraline 100 mg daily  3/10: clinically stable  3/13-14: clinically stable    #GI/Bowel Mgmt  - Senna,  Miralax PRN    #/Bladder Mgmt  - Voiding volitionally, Primafit added for nights so pt doesn't have to worry about drinking too much fluid and having to get up to void at night

## 2023-03-15 PROCEDURE — 99232 SBSQ HOSP IP/OBS MODERATE 35: CPT

## 2023-03-15 RX ADMIN — OXYCODONE HYDROCHLORIDE 5 MILLIGRAM(S): 5 TABLET ORAL at 03:39

## 2023-03-15 RX ADMIN — OXYCODONE HYDROCHLORIDE 5 MILLIGRAM(S): 5 TABLET ORAL at 22:10

## 2023-03-15 RX ADMIN — SERTRALINE 100 MILLIGRAM(S): 25 TABLET, FILM COATED ORAL at 11:16

## 2023-03-15 RX ADMIN — OXYCODONE HYDROCHLORIDE 5 MILLIGRAM(S): 5 TABLET ORAL at 13:33

## 2023-03-15 RX ADMIN — OXYCODONE HYDROCHLORIDE 5 MILLIGRAM(S): 5 TABLET ORAL at 21:30

## 2023-03-15 RX ADMIN — PANTOPRAZOLE SODIUM 40 MILLIGRAM(S): 20 TABLET, DELAYED RELEASE ORAL at 05:44

## 2023-03-15 RX ADMIN — TRAMADOL HYDROCHLORIDE 25 MILLIGRAM(S): 50 TABLET ORAL at 18:40

## 2023-03-15 RX ADMIN — OXYCODONE HYDROCHLORIDE 5 MILLIGRAM(S): 5 TABLET ORAL at 04:37

## 2023-03-15 RX ADMIN — TRAMADOL HYDROCHLORIDE 25 MILLIGRAM(S): 50 TABLET ORAL at 06:48

## 2023-03-15 RX ADMIN — OXYCODONE HYDROCHLORIDE 5 MILLIGRAM(S): 5 TABLET ORAL at 12:30

## 2023-03-15 RX ADMIN — TRAMADOL HYDROCHLORIDE 25 MILLIGRAM(S): 50 TABLET ORAL at 05:44

## 2023-03-15 RX ADMIN — TRAMADOL HYDROCHLORIDE 25 MILLIGRAM(S): 50 TABLET ORAL at 17:22

## 2023-03-15 RX ADMIN — ENOXAPARIN SODIUM 40 MILLIGRAM(S): 100 INJECTION SUBCUTANEOUS at 17:26

## 2023-03-15 RX ADMIN — SENNA PLUS 2 TABLET(S): 8.6 TABLET ORAL at 17:22

## 2023-03-15 NOTE — PROGRESS NOTE ADULT - SUBJECTIVE AND OBJECTIVE BOX
Cc: Gait dysfunction    HPI: Patient seen and examined at bedside. No acute events overnight.   Pain controlled on Tramadol, no chest pain, no N/V, no Fevers/Chills. No other new ROS  Has been tolerating rehabilitation program.    enoxaparin Injectable 40 milliGRAM(s) SubCutaneous every 24 hours  oxyCODONE    IR 5 milliGRAM(s) Oral every 6 hours PRN  pantoprazole    Tablet 40 milliGRAM(s) Oral before breakfast  polyethylene glycol 3350 17 Gram(s) Oral daily  senna 2 Tablet(s) Oral at bedtime PRN  sertraline 100 milliGRAM(s) Oral daily  traMADol 25 milliGRAM(s) Oral two times a day      T(C): 36.8 (03-15-23 @ 07:56), Max: 36.9 (03-14-23 @ 20:42)  HR: 77 (03-15-23 @ 07:56) (66 - 79)  BP: 137/81 (03-15-23 @ 07:56) (132/67 - 137/81)  RR: 16 (03-15-23 @ 07:56) (16 - 17)  SpO2: 95% (03-15-23 @ 07:56) (94% - 95%)    In NAD  HEENT- EOMI  Heart- S1S2  Lungs- CTA bl.  Abd- + BS, NT  Ext- No calf pain  Neuro- Exam unchanged    ASSESSMENT/PLAN: 81 yo F with PMH of HTN, depression, hearing loss and colon CA (2002, s/p chemo) presents to [facility] ED on 3/7/23 with R hip pain after a mechanical fall at home while watering plants.  X-rays showed a R IT hip fx.  BP elevated to 179/82, WBC 13.64.  EKG showed sinus inderjit at 58 bpm,  S/p ORIF R IT hip fx with locking IM jailene on 3/7 performed by Dr. Rojas w/o complications.  WBAT, LMWH VTE ppx.  Hgb downtrended due to postop blood loss- asymptomatic.  Leucocytosis resolved.  Transferred to the [address] [place] [address] acute rehab unit at [facility] on 3/9.    #S/p comminuted, displaced and angulated R IT hip fx due to mechanical fall 3/7/23  -s/p ORIF with IM jailene by Dr. Rojas on 3/7.  -WBAT  -Pain Mgmt: Tylenol PRN  -Local wound care per ortho surgery  3/10: stable, continue rehab.  R hip incision x 3 with Aquacel dsgs (kept in place), old mild serosanguinous drainage visible underneath  3/13-15: Aquacel dressing dry, intact    #Impaired ADLs/mobility, disposition/baseline (home: pt lives alone in private house, 3 shelly w/rail, 1 flight to .  Pt is independent w/ambulation and ADL's w/o AD at baseline, +drives/shops.  Has .  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   as of Dec 2021.  Daughter in [place], son in [place], son in [place].  Daughter to be visiting pt here)  - Fxnal status during acute hospitalization: min a  - Begin multidisciplinary intensive rehab program with team conference qwk  3/10: undergoing acute inpatient rehabilitation therapy evals.  Vasovagal/orthostatic hypotension pre-syncopal episode this AM; doing better after IV and po fluids.  TEDs added, amlodipine d/c'ed.  Rec therapy added given pt avid classical .    #Acute blood loss anemia likely post op anemia  - Presenting Hgb 14.4 on 3/7/23 in ED  -  mls in OR 3/7/23  - monitor cbc  3/10: Hgb 8.0 this AM, no signs of bleeding, continue to monitor.  3/13: S/p transfusion over weekend, Hb stable at 9.9  3/14-15: no signs of bleeding, continue to monitor    #Cardiovascular/ h/o HTN  - sinus inderjit on presentation  - Transferred to rehab on amlodipine 5 mg daily  3/10: amlodipine d/c'ed by hospitalist after pre-syncopal episode this AM.  TEDs added.  No indication of cardiac event this AM: troponins neg.  ECG wet read showing NSR with flipped septal Ts (no significant change vs 3/7); await formal read.    3/14-15: stable, BP WNL, HR controlled    #VTE PPx  - Enoxaparin, routine mechanical measures  - monitor for clots and adverse efx of ppx measures  3/10 plt 146 this AM, continue to monitor, elisabeth while on LMWH  3/13: Plt 238 this AM, continue to monitor    #Leukocytosis-resolved  - Likely reactive, no evidence of infection  - Monitor CBC  3/10: aleucocytotic  3/13: aleucocytotic    #Elevated AST  - Monitor CMP  3/10: AST 51, improving, clinically stable  3/13: ALT/AST elevated over weekend, stopped Tylenol, will give Tramadol if needed for pain, hospitalist input appreciated  3/14: US Abdomen showed no significant findings of liver, she will f/u with PCP regarding renal cysts as OP    #Depression  - on Sertraline 100 mg daily  3/10: clinically stable  3/13-15: clinically stable    #GI/Bowel Mgmt  - Senna,  Miralax PRN    #/Bladder Mgmt  - Voiding volitionally, Primafit added for nights so pt doesn't have to worry about drinking too much fluid and having to get up to void at night

## 2023-03-15 NOTE — PROGRESS NOTE ADULT - ASSESSMENT
81 yo F with PMH of HTN, depression, hearing loss and colon CA (2002, s/p chemo) presents to [facility] with right hip pain after mechanical fall, found to have  right hip fracture. s/p ORIF. discharged to [facility] rehab on 3/9- pt/ot/dvt ppx    # Right Proximal Femur Fracture  - s/p ORIF IM nailing on 3/7    # RRT on 3/10 for syncope  # post-op anemia  - likely vasovagal due to straining for BM   - no orthostatic drop in BP  - s/p one unit of PRBC as BP soft and patient had syncope, Hb now 9.9  - hold amlodipine for now.   - check orthostasis post-IVF and in am.     #Leukocytosis-resolved  - Likely reactive  - improved  - Monitor CBC    #transaminitis- monitor  - US Abdomen Complete (US Abdomen Complete .) (03.13.23 @ 13:19)  Liver: Within normal limits. Bile ducts: Normal caliber. Common bile duct measures 3 mm. Gallbladder: Within normal limits.  - avoid hepatotoxic agents  - monitor    #HTN  - hold Amlodipine     #Depression  - Continue Sertraline     #VTE PPx  - Lovenox     will follow  d/w rehab team

## 2023-03-15 NOTE — PROGRESS NOTE ADULT - SUBJECTIVE AND OBJECTIVE BOX
82y old  Female who presents with a chief complaint of Displaced intertrochanteric fracture of right femur, initial encounter for closed fracture    seen at the bedside, c/o mild headache this am, no overnight issues, no n/v, no sob, no cp.      Vital Signs Last 24 Hrs  T(C): 36.8 (15 Mar 2023 07:56), Max: 36.9 (14 Mar 2023 20:42)  T(F): 98.2 (15 Mar 2023 07:56), Max: 98.5 (14 Mar 2023 20:42)  HR: 77 (15 Mar 2023 07:56) (66 - 79)  BP: 137/81 (15 Mar 2023 07:56) (132/67 - 137/81)  BP(mean): --  RR: 16 (15 Mar 2023 07:56) (16 - 17)  SpO2: 95% (15 Mar 2023 07:56) (94% - 95%)    Parameters below as of 15 Mar 2023 07:56  Patient On (Oxygen Delivery Method): room air      GENERAL- NAD  EAR/NOSE/MOUTH/THROAT - no pharyngeal exudates, no oral lesions  MMM  EYES- LENY, conjunctiva and Sclera clear  NECK- supple  RESPIRATORY-  clear to auscultation bilaterally, non laboured breathing  CARDIOVASCULAR - SIS2, RRR  GI - soft NT BS present  EXTREMITIES- no pedal edema, RLE swelling around hip and knee  NEUROLOGY- no gross focal deficits  PSYCHIATRY- AAO X 3        MEDICATIONS  (STANDING):  enoxaparin Injectable 40 milliGRAM(s) SubCutaneous every 24 hours  pantoprazole  Tablet 40 milliGRAM(s) Oral before breakfast  polyethylene glycol 3350 17 Gram(s) Oral daily  sertraline 100 milliGRAM(s) Oral daily  traMADol 25 milliGRAM(s) Oral two times a day    MEDICATIONS  (PRN):  oxyCODONE    IR 5 milliGRAM(s) Oral every 6 hours PRN Severe Pain (7 - 10)  senna 2 Tablet(s) Oral at bedtime PRN Constipation

## 2023-03-16 LAB
ALBUMIN SERPL ELPH-MCNC: 2.6 G/DL — LOW (ref 3.3–5)
ALP SERPL-CCNC: 168 U/L — HIGH (ref 40–120)
ALT FLD-CCNC: 128 U/L — HIGH (ref 10–45)
ANION GAP SERPL CALC-SCNC: 7 MMOL/L — SIGNIFICANT CHANGE UP (ref 5–17)
AST SERPL-CCNC: 83 U/L — HIGH (ref 10–40)
BILIRUB SERPL-MCNC: 0.9 MG/DL — SIGNIFICANT CHANGE UP (ref 0.2–1.2)
BUN SERPL-MCNC: 20 MG/DL — SIGNIFICANT CHANGE UP (ref 7–23)
CALCIUM SERPL-MCNC: 8.4 MG/DL — SIGNIFICANT CHANGE UP (ref 8.4–10.5)
CHLORIDE SERPL-SCNC: 107 MMOL/L — SIGNIFICANT CHANGE UP (ref 96–108)
CO2 SERPL-SCNC: 27 MMOL/L — SIGNIFICANT CHANGE UP (ref 22–31)
CREAT SERPL-MCNC: 0.77 MG/DL — SIGNIFICANT CHANGE UP (ref 0.5–1.3)
EGFR: 76 ML/MIN/1.73M2 — SIGNIFICANT CHANGE UP
FERRITIN SERPL-MCNC: 162 NG/ML — HIGH (ref 15–150)
GLUCOSE SERPL-MCNC: 94 MG/DL — SIGNIFICANT CHANGE UP (ref 70–99)
HCT VFR BLD CALC: 31.3 % — LOW (ref 34.5–45)
HGB BLD-MCNC: 10 G/DL — LOW (ref 11.5–15.5)
IRON SATN MFR SERPL: 24 % — SIGNIFICANT CHANGE UP (ref 14–50)
IRON SATN MFR SERPL: 55 UG/DL — SIGNIFICANT CHANGE UP (ref 30–160)
MCHC RBC-ENTMCNC: 31.9 GM/DL — LOW (ref 32–36)
MCHC RBC-ENTMCNC: 32.5 PG — SIGNIFICANT CHANGE UP (ref 27–34)
MCV RBC AUTO: 101.6 FL — HIGH (ref 80–100)
NRBC # BLD: 0 /100 WBCS — SIGNIFICANT CHANGE UP (ref 0–0)
PLATELET # BLD AUTO: 338 K/UL — SIGNIFICANT CHANGE UP (ref 150–400)
POTASSIUM SERPL-MCNC: 4 MMOL/L — SIGNIFICANT CHANGE UP (ref 3.5–5.3)
POTASSIUM SERPL-SCNC: 4 MMOL/L — SIGNIFICANT CHANGE UP (ref 3.5–5.3)
PROT SERPL-MCNC: 5.7 G/DL — LOW (ref 6–8.3)
RBC # BLD: 3.08 M/UL — LOW (ref 3.8–5.2)
RBC # FLD: 16.7 % — HIGH (ref 10.3–14.5)
SODIUM SERPL-SCNC: 141 MMOL/L — SIGNIFICANT CHANGE UP (ref 135–145)
TIBC SERPL-MCNC: 226 UG/DL — SIGNIFICANT CHANGE UP (ref 220–430)
UIBC SERPL-MCNC: 171 UG/DL — SIGNIFICANT CHANGE UP (ref 110–370)
WBC # BLD: 7.82 K/UL — SIGNIFICANT CHANGE UP (ref 3.8–10.5)
WBC # FLD AUTO: 7.82 K/UL — SIGNIFICANT CHANGE UP (ref 3.8–10.5)

## 2023-03-16 PROCEDURE — 99232 SBSQ HOSP IP/OBS MODERATE 35: CPT

## 2023-03-16 RX ADMIN — TRAMADOL HYDROCHLORIDE 25 MILLIGRAM(S): 50 TABLET ORAL at 17:08

## 2023-03-16 RX ADMIN — OXYCODONE HYDROCHLORIDE 5 MILLIGRAM(S): 5 TABLET ORAL at 22:43

## 2023-03-16 RX ADMIN — ENOXAPARIN SODIUM 40 MILLIGRAM(S): 100 INJECTION SUBCUTANEOUS at 17:07

## 2023-03-16 RX ADMIN — SERTRALINE 100 MILLIGRAM(S): 25 TABLET, FILM COATED ORAL at 11:08

## 2023-03-16 RX ADMIN — PANTOPRAZOLE SODIUM 40 MILLIGRAM(S): 20 TABLET, DELAYED RELEASE ORAL at 06:24

## 2023-03-16 RX ADMIN — POLYETHYLENE GLYCOL 3350 17 GRAM(S): 17 POWDER, FOR SOLUTION ORAL at 11:08

## 2023-03-16 RX ADMIN — OXYCODONE HYDROCHLORIDE 5 MILLIGRAM(S): 5 TABLET ORAL at 14:25

## 2023-03-16 RX ADMIN — OXYCODONE HYDROCHLORIDE 5 MILLIGRAM(S): 5 TABLET ORAL at 21:06

## 2023-03-16 RX ADMIN — OXYCODONE HYDROCHLORIDE 5 MILLIGRAM(S): 5 TABLET ORAL at 12:58

## 2023-03-16 RX ADMIN — TRAMADOL HYDROCHLORIDE 25 MILLIGRAM(S): 50 TABLET ORAL at 06:25

## 2023-03-16 RX ADMIN — TRAMADOL HYDROCHLORIDE 25 MILLIGRAM(S): 50 TABLET ORAL at 07:09

## 2023-03-16 RX ADMIN — TRAMADOL HYDROCHLORIDE 25 MILLIGRAM(S): 50 TABLET ORAL at 18:10

## 2023-03-16 NOTE — PROGRESS NOTE ADULT - SUBJECTIVE AND OBJECTIVE BOX
82y old  Female who presents with a chief complaint of Displaced intertrochanteric fracture of right femur, initial encounter for closed fracture    seen at the bedside, c/o mild headache again this am, does not want pain meds. NAD, Denies N/V//CP    Vital Signs Last 24 Hrs  T(C): 36.8 (15 Mar 2023 07:56), Max: 36.9 (14 Mar 2023 20:42)  T(F): 98.2 (15 Mar 2023 07:56), Max: 98.5 (14 Mar 2023 20:42)  HR: 77 (15 Mar 2023 07:56) (66 - 79)  BP: 137/81 (15 Mar 2023 07:56) (132/67 - 137/81)  BP(mean): --  RR: 16 (15 Mar 2023 07:56) (16 - 17)  SpO2: 95% (15 Mar 2023 07:56) (94% - 95%)    Parameters below as of 15 Mar 2023 07:56  Patient On (Oxygen Delivery Method): room air      GENERAL- NAD  EAR/NOSE/MOUTH/THROAT - no pharyngeal exudates, no oral lesions  MMM  EYES- LENY, conjunctiva and Sclera clear  NECK- supple  RESPIRATORY-  clear to auscultation bilaterally, non laboured breathing  CARDIOVASCULAR - SIS2, RRR  GI - soft NT BS present  EXTREMITIES- no L pedal edema, RLE swelling around hip and knee  NEUROLOGY- no gross focal deficits  PSYCHIATRY- AAO X 3                10.0                 141  | 27   | 20           7.82  >-----------< 338     ------------------------< 94                    31.3                 4.0  | 107  | 0.77                                         Ca 8.4   Mg x     Ph x          MEDICATIONS  (STANDING):  enoxaparin Injectable 40 milliGRAM(s) SubCutaneous every 24 hours  pantoprazole  Tablet 40 milliGRAM(s) Oral before breakfast  polyethylene glycol 3350 17 Gram(s) Oral daily  sertraline 100 milliGRAM(s) Oral daily  traMADol 25 milliGRAM(s) Oral two times a day    MEDICATIONS  (PRN):  oxyCODONE    IR 5 milliGRAM(s) Oral every 6 hours PRN Severe Pain (7 - 10)  senna 2 Tablet(s) Oral at bedtime PRN Constipation

## 2023-03-16 NOTE — PROGRESS NOTE ADULT - ASSESSMENT
81 yo F with PMH of HTN, depression, hearing loss and colon CA (2002, s/p chemo) presents to [facility] with right hip pain after mechanical fall, found to have  right hip fracture. s/p ORIF. discharged to [facility] rehab on 3/9- pt/ot/dvt ppx    # Right Proximal Femur Fracture  - s/p ORIF IM nailing on 3/7    #transaminitis- monitor, downtrending  - US Abdomen Complete (US Abdomen Complete .) (03.13.23 @ 13:19)  Liver: Within normal limits. Bile ducts: Normal caliber. Common bile duct measures 3 mm. Gallbladder: Within normal limits.  - avoid hepatotoxic agents  - monitor    #HTN  - hold Amlodipine     #Depression  - Continue Sertraline     #Leukocytosis-resolved  - Likely reactive  - improved  - Monitor CBC    # RRT on 3/10 for syncope  # post-op anemia  - likely vasovagal due to straining for BM   - no orthostatic drop in BP  - s/p one unit of PRBC as BP soft and patient had syncope, Hb now 9.9  - hold amlodipine for now.   - check orthostasis post-IVF and in am.     #VTE PPx  - Lovenox     will follow  d/w rehab team

## 2023-03-16 NOTE — PROGRESS NOTE ADULT - ASSESSMENT
83 yo F with PMH of HTN, depression, hearing loss and colon CA (2002, s/p chemo) presents to [facility] with right hip pain after mechanical fall, found to have  right hip fracture. s/p ORIF. discharged to [facility] rehab on 3/9- pt/ot/dvt ppx    Right Proximal Femur Fracture  - s/p ORIF IM nailing on 3/7    transaminitis- monitor, downtrending  - US Abdomen Complete (US Abdomen Complete .) (03.13.23 @ 13:19)  Liver: Within normal limits. Bile ducts: Normal caliber. Common bile duct measures 3 mm. Gallbladder: Within normal limits.  - avoid hepatotoxic agents  - monitor    HTN  - hold Amlodipine     Depression  - Continue Sertraline     Leukocytosis-resolved  - Likely reactive  - improved  - Monitor CBC    RRT on 3/10 for syncope  post-op anemia  - likely vasovagal due to straining for BM   - no orthostatic drop in BP  - s/p one unit of PRBC as BP soft and patient had syncope, Hb now 9.9  - hold amlodipine for now.   - check orthostasis post-IVF and in am.     VTE PPx: Lovenox     Plan d/w Dr. Fernandez   81 yo F with PMH of HTN, depression, hearing loss and colon CA (2002, s/p chemo) presents to [facility] with right hip pain after mechanical fall, found to have  right hip fracture. s/p ORIF. discharged to [facility] rehab on 3/9- pt/ot/dvt ppx    Right Proximal Femur Fracture  - s/p ORIF IM nailing on 3/7    transaminitis- monitor, downtrending  - US Abdomen Complete (US Abdomen Complete .) (03.13.23 @ 13:19)  Liver: Within normal limits. Bile ducts: Normal caliber. Common bile duct measures 3 mm. Gallbladder: Within normal limits.  - avoid hepatotoxic agents  - monitor    HTN  - hold Amlodipine     Depression  - Continue Sertraline     Leukocytosis-resolved  - Likely reactive  - improved  - Monitor CBC    RRT on 3/10 for syncope  post-op anemia  - likely vasovagal due to straining for BM   - no orthostatic drop in BP  - s/p one unit of PRBC as BP soft and patient had syncope, Hb now 10.0  - hold amlodipine for now.   - check orthostasis post-IVF and in am.     VTE PPx: Lovenox     Plan d/w Dr. Fernandez

## 2023-03-16 NOTE — PROGRESS NOTE ADULT - SUBJECTIVE AND OBJECTIVE BOX
82y old  Female who presents with a chief complaint of Displaced intertrochanteric fracture of right femur, initial encounter for closed fracture    seen at the bedside, c/o mild headache again this am, does not want pain meds, no overnight issues, no n/v, no sob, no cp.    Vital Signs Last 24 Hrs  T(C): 36.8 (15 Mar 2023 07:56), Max: 36.9 (14 Mar 2023 20:42)  T(F): 98.2 (15 Mar 2023 07:56), Max: 98.5 (14 Mar 2023 20:42)  HR: 77 (15 Mar 2023 07:56) (66 - 79)  BP: 137/81 (15 Mar 2023 07:56) (132/67 - 137/81)  BP(mean): --  RR: 16 (15 Mar 2023 07:56) (16 - 17)  SpO2: 95% (15 Mar 2023 07:56) (94% - 95%)    Parameters below as of 15 Mar 2023 07:56  Patient On (Oxygen Delivery Method): room air      GENERAL- NAD  EAR/NOSE/MOUTH/THROAT - no pharyngeal exudates, no oral lesions  MMM  EYES- LENY, conjunctiva and Sclera clear  NECK- supple  RESPIRATORY-  clear to auscultation bilaterally, non laboured breathing  CARDIOVASCULAR - SIS2, RRR  GI - soft NT BS present  EXTREMITIES- no L pedal edema, RLE swelling around hip and knee  NEUROLOGY- no gross focal deficits  PSYCHIATRY- AAO X 3                10.0                 141  | 27   | 20           7.82  >-----------< 338     ------------------------< 94                    31.3                 4.0  | 107  | 0.77                                         Ca 8.4   Mg x     Ph x          MEDICATIONS  (STANDING):  enoxaparin Injectable 40 milliGRAM(s) SubCutaneous every 24 hours  pantoprazole  Tablet 40 milliGRAM(s) Oral before breakfast  polyethylene glycol 3350 17 Gram(s) Oral daily  sertraline 100 milliGRAM(s) Oral daily  traMADol 25 milliGRAM(s) Oral two times a day    MEDICATIONS  (PRN):  oxyCODONE    IR 5 milliGRAM(s) Oral every 6 hours PRN Severe Pain (7 - 10)  senna 2 Tablet(s) Oral at bedtime PRN Constipation

## 2023-03-16 NOTE — PROGRESS NOTE ADULT - ASSESSMENT
83 yo F with PMH of HTN, depression, hearing loss and colon CA (2002, s/p chemo) presents to [facility] ED on 3/7/23 with R hip pain after a mechanical fall at home while watering plants.  X-rays showed a R IT hip fx.  BP elevated to 179/82, WBC 13.64.  EKG showed sinus inderjit at 58 bpm,  S/p ORIF R IT hip fx with locking IM jailene on 3/7 performed by Dr. Rojas w/o complications.  WBAT, LMWH VTE ppx.  Hgb downtrended due to postop blood loss- asymptomatic.  Leucocytosis resolved.  Transferred to the [address] [place] [address] acute rehab unit at [facility] on 3/9.    #S/p comminuted, displaced and angulated R IT hip fx due to mechanical fall 3/7/23  -s/p ORIF with IM jailene by Dr. Rojas on 3/7.  -WBAT  -Pain Mgmt: Tylenol PRN  -Local wound care per ortho surgery    #Impaired ADLs/mobility, disposition/baseline (home: pt lives alone in private house, 3 shelly w/rail, 1 flight to .  Pt is independent w/ambulation and ADL's w/o AD at baseline, +drives/shops.  Has .  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   as of Dec 2021.  Daughter in [place], son in [place], son in [place].  Daughter to be visiting pt here)        #Acute blood loss anemia likely post op anemia  - Presenting Hgb 14.4 on 3/7/23 in ED  -  mls in OR 3/7/23  - monitor cbc  3/10: Hgb 8.0 this AM, no signs of bleeding, continue to monitor.  3/13: S/p transfusion over weekend, Hb stable at 9.9  3/14-15: no signs of bleeding, continue to monitor    #Cardiovascular/ h/o HTN  - sinus inderjit on presentation  - Transferred to rehab on amlodipine 5 mg daily  3/10: amlodipine d/c'ed by hospitalist after pre-syncopal episode this AM.  TEDs added.  No indication of cardiac event this AM: troponins neg.  ECG wet read showing NSR with flipped septal Ts (no significant change vs 3/7); await formal read.    3/14-15: stable, BP WNL, HR controlled    #VTE PPx  - Enoxaparin, routine mechanical measures  - monitor for clots and adverse efx of ppx measures  3/10 plt 146 this AM, continue to monitor, elisabeth while on LMWH  3/13: Plt 238 this AM, continue to monitor    #Leukocytosis-resolved  - Likely reactive, no evidence of infection  - Monitor CBC  3/10: aleucocytotic  3/13: aleucocytotic    #Elevated AST  - Monitor CMP  3/10: AST 51, improving, clinically stable  3/13: ALT/AST elevated over weekend, stopped Tylenol, will give Tramadol if needed for pain, hospitalist input appreciated  3/14: US Abdomen showed no significant findings of liver, she will f/u with PCP regarding renal cysts as OP    #Depression  - on Sertraline 100 mg daily  3/10: clinically stable  3/13-15: clinically stable    #GI/Bowel Mgmt  - Senna,  Miralax PRN    #/Bladder Mgmt  - Voiding volitionally, Primafit added for nights so pt doesn't have to worry about drinking too much fluid and having to get up to void at night

## 2023-03-16 NOTE — PROGRESS NOTE ADULT - SUBJECTIVE AND OBJECTIVE BOX
CHIEF COMPLAINT: right hip fracture    S: some right hip pain and swelling, had BM yesterday, slept well, po hydration, doing well in therapy  O: hip bandage mildly stained with blood  Right lower limb swollen, stable  Heart: RRR          PMH  PAST MEDICAL & SURGICAL HISTORY:  HTN (hypertension)  x 3 years, controlled      Colon cancer  2002 had chemo.      Volvulus of ascending colon  7/2017  s/p colon surgery      Hard of hearing      S/P colon resection      S/P tonsillectomy and adenoidectomy        VITALS  Vital Signs Last 24 Hrs  T(C): 36.7 (16 Mar 2023 08:32), Max: 36.9 (15 Mar 2023 21:03)  T(F): 98.1 (16 Mar 2023 08:32), Max: 98.4 (15 Mar 2023 21:03)  HR: 69 (15 Mar 2023 21:03) (69 - 69)  BP: 130/69 (15 Mar 2023 21:03) (130/69 - 130/69)  BP(mean): --  RR: 17 (16 Mar 2023 08:32) (17 - 18)  SpO2: 98% (16 Mar 2023 08:32) (96% - 98%)    Parameters below as of 16 Mar 2023 08:32  Patient On (Oxygen Delivery Method): room air          PHYSICAL EXAM  Constitutional - NAD, Comfortable  HEENT - NCAT, EOMI  Neck - Supple, No limited ROM  Chest - CTA bilaterally, No wheeze, No rhonchi, No crackles  Cardiovascular - RRR, S1S2, No murmurs  Abdomen - BS+, Soft, NTND  Extremities - No C/C/E, No calf tenderness   Skin-no rash  Wounds- right hip aquacell bandage x3            RECENT LABS                        10.0   7.82  )-----------( 338      ( 16 Mar 2023 06:34 )             31.3     03-16    141  |  107  |  20  ----------------------------<  94  4.0   |  27  |  0.77    Ca    8.4      16 Mar 2023 06:34    TPro  5.7<L>  /  Alb  2.6<L>  /  TBili  0.9  /  DBili  x   /  AST  83<H>  /  ALT  128<H>  /  AlkPhos  168<H>  03-16      LIVER FUNCTIONS - ( 16 Mar 2023 06:34 )  Alb: 2.6 g/dL / Pro: 5.7 g/dL / ALK PHOS: 168 U/L / ALT: 128 U/L / AST: 83 U/L / GGT: x                           RADIOLOGY/OTHER RESULTS      CURRENT MEDICATIONS  MEDICATIONS  (STANDING):  enoxaparin Injectable 40 milliGRAM(s) SubCutaneous every 24 hours  pantoprazole    Tablet 40 milliGRAM(s) Oral before breakfast  polyethylene glycol 3350 17 Gram(s) Oral daily  sertraline 100 milliGRAM(s) Oral daily  traMADol 25 milliGRAM(s) Oral two times a day    MEDICATIONS  (PRN):  oxyCODONE    IR 5 milliGRAM(s) Oral every 6 hours PRN Severe Pain (7 - 10)  senna 2 Tablet(s) Oral at bedtime PRN Constipation          Continue comprehensive acute rehab program consisting of 3hrs/day of OT/PT and SLP.

## 2023-03-17 LAB
APPEARANCE UR: CLEAR — SIGNIFICANT CHANGE UP
BACTERIA # UR AUTO: ABNORMAL /HPF
BILIRUB UR-MCNC: NEGATIVE — SIGNIFICANT CHANGE UP
COLOR SPEC: YELLOW — SIGNIFICANT CHANGE UP
COMMENT - URINE: SIGNIFICANT CHANGE UP
DIFF PNL FLD: NEGATIVE — SIGNIFICANT CHANGE UP
EPI CELLS # UR: SIGNIFICANT CHANGE UP
GLUCOSE UR QL: NEGATIVE — SIGNIFICANT CHANGE UP
KETONES UR-MCNC: NEGATIVE — SIGNIFICANT CHANGE UP
LEUKOCYTE ESTERASE UR-ACNC: NEGATIVE — SIGNIFICANT CHANGE UP
NITRITE UR-MCNC: NEGATIVE — SIGNIFICANT CHANGE UP
PH UR: 6 — SIGNIFICANT CHANGE UP (ref 5–8)
PROT UR-MCNC: 15
RBC CASTS # UR COMP ASSIST: SIGNIFICANT CHANGE UP /HPF (ref 0–4)
SP GR SPEC: 1.01 — SIGNIFICANT CHANGE UP (ref 1.01–1.02)
UROBILINOGEN FLD QL: NEGATIVE — SIGNIFICANT CHANGE UP
WBC UR QL: SIGNIFICANT CHANGE UP /HPF (ref 0–5)

## 2023-03-17 PROCEDURE — 73502 X-RAY EXAM HIP UNI 2-3 VIEWS: CPT | Mod: 26,RT

## 2023-03-17 PROCEDURE — 99232 SBSQ HOSP IP/OBS MODERATE 35: CPT

## 2023-03-17 PROCEDURE — 99233 SBSQ HOSP IP/OBS HIGH 50: CPT

## 2023-03-17 PROCEDURE — 93971 EXTREMITY STUDY: CPT | Mod: 26,RT

## 2023-03-17 RX ADMIN — ENOXAPARIN SODIUM 40 MILLIGRAM(S): 100 INJECTION SUBCUTANEOUS at 17:47

## 2023-03-17 RX ADMIN — TRAMADOL HYDROCHLORIDE 25 MILLIGRAM(S): 50 TABLET ORAL at 17:49

## 2023-03-17 RX ADMIN — POLYETHYLENE GLYCOL 3350 17 GRAM(S): 17 POWDER, FOR SOLUTION ORAL at 14:14

## 2023-03-17 RX ADMIN — SERTRALINE 100 MILLIGRAM(S): 25 TABLET, FILM COATED ORAL at 14:14

## 2023-03-17 RX ADMIN — PANTOPRAZOLE SODIUM 40 MILLIGRAM(S): 20 TABLET, DELAYED RELEASE ORAL at 05:52

## 2023-03-17 RX ADMIN — TRAMADOL HYDROCHLORIDE 25 MILLIGRAM(S): 50 TABLET ORAL at 05:52

## 2023-03-17 RX ADMIN — TRAMADOL HYDROCHLORIDE 25 MILLIGRAM(S): 50 TABLET ORAL at 18:25

## 2023-03-17 RX ADMIN — TRAMADOL HYDROCHLORIDE 25 MILLIGRAM(S): 50 TABLET ORAL at 06:59

## 2023-03-17 NOTE — PROGRESS NOTE ADULT - ASSESSMENT
83 yo F with PMH of HTN, depression, hearing loss and colon CA (2002, s/p chemo) presents to [facility] with right hip pain after mechanical fall, found to have  right hip fracture. s/p ORIF. discharged to [facility] rehab on 3/9- pt/ot/dvt ppx    Right Proximal Femur Fracture  - s/p ORIF IM nailing on 3/7    transaminitis- monitor, downtrending  - US Abdomen Complete (US Abdomen Complete .) (03.13.23 @ 13:19)  Liver: Within normal limits. Bile ducts: Normal caliber. Common bile duct measures 3 mm. Gallbladder: Within normal limits.  - avoid hepatotoxic agents  - monitor    HTN  - orthostatic this am  - Amlodipine on hold  - repeat orthostatics  - encourage hydration  - UA negative    Depression  - Continue Sertraline     Leukocytosis-resolved  - Likely reactive  - improved  - Monitor CBC    RRT on 3/10 for syncope  - post-op anemia  - likely vasovagal due to straining for BM   - no orthostatic drop in BP  - s/p one unit of PRBC as BP soft and patient had syncope, Hb now 10.0  - hold amlodipine for now.   - check orthostasis post-IVF and in am.     VTE PPx: Lovenox     Plan d/w Dr. Fernandez   81 yo F with PMH of HTN, depression, hearing loss and colon CA (2002, s/p chemo) presents to [facility] with right hip pain after mechanical fall, found to have  right hip fracture. s/p ORIF. discharged to [facility] rehab on 3/9- pt/ot/dvt ppx    Right Proximal Femur Fracture  - s/p ORIF IM nailing on 3/7    transaminitis- monitor, downtrending  - US Abdomen Complete (US Abdomen Complete .) (03.13.23 @ 13:19)  Liver: Within normal limits. Bile ducts: Normal caliber. Common bile duct measures 3 mm. Gallbladder: Within normal limits.  - avoid hepatotoxic agents  - monitor    HTN  - ? orthostatic this am- symptomatic , orthostatic bp done and noted  - Amlodipine on hold  - repeat orthostatics  - encourage hydration  - UA negative    Depression  - Continue Sertraline     Leukocytosis-resolved  - Likely reactive  - improved  - Monitor CBC    RRT on 3/10 for syncope  - post-op anemia  - likely vasovagal due to straining for BM   - no orthostatic drop in BP  - s/p one unit of PRBC as BP soft and patient had syncope, Hb now 10.0  - hold amlodipine for now.   - check orthostasis post-IVF and in am.     VTE PPx: Lovenox     will follow  d/w dr. powell rehab attending  time spent in e/m visit- 50 min

## 2023-03-17 NOTE — PROGRESS NOTE ADULT - SUBJECTIVE AND OBJECTIVE BOX
82y old  Female who presents with a chief complaint of Displaced intertrochanteric fracture of right femur, initial encounter for closed fracture    seen at the bedside, no new complaints, orthostatic with PT    Vital Signs Last 24 Hrs  T(C): 36.8 (17 Mar 2023 08:30), Max: 37.1 (16 Mar 2023 20:53)  T(F): 98.2 (17 Mar 2023 08:30), Max: 98.8 (16 Mar 2023 20:53)  HR: 79 (17 Mar 2023 08:30) (70 - 79)  BP: 147/75 (17 Mar 2023 08:30) (136/66 - 147/75)  BP(mean): --  RR: 14 (17 Mar 2023 08:30) (14 - 16)  SpO2: 96% (17 Mar 2023 08:30) (94% - 96%)    Parameters below as of 16 Mar 2023 20:53  Patient On (Oxygen Delivery Method): room air    GENERAL- NAD  EAR/NOSE/MOUTH/THROAT - no pharyngeal exudates, no oral lesions  MMM  EYES- LENY, conjunctiva and Sclera clear  NECK- supple  RESPIRATORY-  clear to auscultation bilaterally, non laboured breathing  CARDIOVASCULAR - SIS2, RRR  GI - soft NT BS present  EXTREMITIES- no L pedal edema, RLE swelling around hip and knee  NEUROLOGY- no gross focal deficits  PSYCHIATRY- AAO X 3                   10.0                 141  | 27   | 20           7.82  >-----------< 338     ------------------------< 94                    31.3                 4.0  | 107  | 0.77                                         Ca 8.4   Mg x     Ph x      Urinalysis Basic - ( 17 Mar 2023 11:32 )    Color: Yellow / Appearance: Clear / S.015 / pH: x  Gluc: x / Ketone: Negative  / Bili: Negative / Urobili: Negative   Blood: x / Protein: 15 / Nitrite: Negative   Leuk Esterase: Negative / RBC: x / WBC x   Sq Epi: x / Non Sq Epi: x / Bacteria: x      Labs reviewed:     CXR personally reviewed: < from: Xray Chest 1 View- PORTABLE-Urgent (23 @ 11:11) >  Impression:    No acute pulmonary disease.    < end of copied text >      ECG reviewed and interpreted: < from: 12 Lead ECG (03.10.23 @ 10:05) >  Ventricular Rate 71 BPM    Atrial Rate 71 BPM    P-R Interval 140 ms    QRS Duration 82 ms    Q-T Interval 406 ms    QTC Calculation(Bazett) 441 ms    P Axis 29 degrees    R Axis 14 degrees    T Axis 83 degrees    Diagnosis Line Normal sinus rhythm  Nonspecific T wave abnormality  Abnormal ECG  When compared with ECG of 07-MAR-2023 10:18,  no significant change noted    < end of copied text >      MEDICATIONS  (STANDING):  enoxaparin Injectable 40 milliGRAM(s) SubCutaneous every 24 hours  pantoprazole  Tablet 40 milliGRAM(s) Oral before breakfast  polyethylene glycol 3350 17 Gram(s) Oral daily  sertraline 100 milliGRAM(s) Oral daily  traMADol 25 milliGRAM(s) Oral two times a day    MEDICATIONS  (PRN):  oxyCODONE    IR 5 milliGRAM(s) Oral every 6 hours PRN Severe Pain (7 - 10)  senna 2 Tablet(s) Oral at bedtime PRN Constipation       82y old  Female who presents with a chief complaint of Displaced intertrochanteric fracture of right femur, initial encounter for closed fracture    seen at the bedside, no new complaints, ?orthostatic with OT, no symptoms, BP stable later on rechecking.  no dizziness, no sob, no palpitations    Vital Signs Last 24 Hrs  T(C): 36.8 (17 Mar 2023 08:30), Max: 37.1 (16 Mar 2023 20:53)  T(F): 98.2 (17 Mar 2023 08:30), Max: 98.8 (16 Mar 2023 20:53)  HR: 79 (17 Mar 2023 08:30) (70 - 79)  BP: 147/75 (17 Mar 2023 08:30) (136/66 - 147/75)  BP(mean): --  RR: 14 (17 Mar 2023 08:30) (14 - 16)  SpO2: 96% (17 Mar 2023 08:30) (94% - 96%)    Parameters below as of 16 Mar 2023 20:53  Patient On (Oxygen Delivery Method): room air    Orthostatic VS    23 @ 08:32  Lying BP: 136/76 HR: 76   Sitting BP: 122/70 HR: 85  Standing BP: 118/69 HR: 84  Site: upper left arm   Mode: electronic    GENERAL- NAD  EAR/NOSE/MOUTH/THROAT - no pharyngeal exudates, no oral lesions  MMM  EYES- LENY, conjunctiva and Sclera clear  NECK- supple  RESPIRATORY-  clear to auscultation bilaterally, non laboured breathing  CARDIOVASCULAR - SIS2, RRR  GI - soft NT BS present  EXTREMITIES- no L pedal edema, RLE swelling around hip and knee  NEUROLOGY- no gross focal deficits  PSYCHIATRY- AAO X 3                   10.0                 141  | 27   | 20           7.82  >-----------< 338     ------------------------< 94                    31.3                 4.0  | 107  | 0.77                                         Ca 8.4   Mg x     Ph x      Urinalysis Basic - ( 17 Mar 2023 11:32 )    Color: Yellow / Appearance: Clear / S.015 / pH: x  Gluc: x / Ketone: Negative  / Bili: Negative / Urobili: Negative   Blood: x / Protein: 15 / Nitrite: Negative   Leuk Esterase: Negative / RBC: x / WBC x   Sq Epi: x / Non Sq Epi: x / Bacteria: x      Labs reviewed:     CXR personally reviewed: < from: Xray Chest 1 View- PORTABLE-Urgent (23 @ 11:11) >  Impression:    No acute pulmonary disease.    < end of copied text >      ECG reviewed and interpreted: < from: 12 Lead ECG (03.10.23 @ 10:05) >  Ventricular Rate 71 BPM    Atrial Rate 71 BPM    P-R Interval 140 ms    QRS Duration 82 ms    Q-T Interval 406 ms    QTC Calculation(Bazett) 441 ms    P Axis 29 degrees    R Axis 14 degrees    T Axis 83 degrees    Diagnosis Line Normal sinus rhythm  Nonspecific T wave abnormality  Abnormal ECG  When compared with ECG of 07-MAR-2023 10:18,  no significant change noted    < end of copied text >      MEDICATIONS  (STANDING):  enoxaparin Injectable 40 milliGRAM(s) SubCutaneous every 24 hours  pantoprazole  Tablet 40 milliGRAM(s) Oral before breakfast  polyethylene glycol 3350 17 Gram(s) Oral daily  sertraline 100 milliGRAM(s) Oral daily  traMADol 25 milliGRAM(s) Oral two times a day    MEDICATIONS  (PRN):  oxyCODONE    IR 5 milliGRAM(s) Oral every 6 hours PRN Severe Pain (7 - 10)  senna 2 Tablet(s) Oral at bedtime PRN Constipation

## 2023-03-17 NOTE — PROGRESS NOTE ADULT - ASSESSMENT
83 yo F with PMH of HTN, depression, hearing loss and colon CA (2002, s/p chemo) presents to [facility] ED on 3/7/23 with R hip pain after a mechanical fall at home while watering plants.  X-rays showed a R IT hip fx.  BP elevated to 179/82, WBC 13.64.  EKG showed sinus inderjit at 58 bpm,  S/p ORIF R IT hip fx with locking IM jailene on 3/7 performed by Dr. Rojas w/o complications.  WBAT, LMWH VTE ppx.  Hgb downtrended due to postop blood loss- asymptomatic.  Leucocytosis resolved.  Transferred to the [address] [place] [address] acute rehab unit at [facility] on 3/9.    #S/p comminuted, displaced and angulated R IT hip fx due to mechanical fall 3/7/23  -s/p ORIF with IM jailene by Dr. Rojas on 3/7.  -WBAT  -Pain Mgmt: Tylenol PRN  - US RLE and XR RLE for edema, pain      #Impaired ADLs/mobility, disposition/baseline (home: pt lives alone in private house, 3 [address] w/rail, 1 flight to .  Pt is independent w/ambulation and ADL's w/o AD at baseline, +drives/shops.  Has .  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   as of Dec 2021.  Daughter in [place], son in [place], son in [place].  Daughter to be visiting pt here)    #Acute blood loss anemia likely post op anemia  - Presenting Hgb 14.4 on 3/7/23 in ED  -  mls in OR 3/7/23  - s/p PRBC x1 unit, Hg stable    #Cardiovascular/ h/o HTN  - sinus inderjit on presentation  - Transferred to rehab on amlodipine 5 mg daily  3/10: amlodipine d/c'ed by hospitalist after pre-syncopal episode this AM.  TEDs added.  No indication of cardiac event this AM: troponins neg.  ECG wet read showing NSR with flipped septal Ts (no significant change vs 3/7); await formal read.      #VTE PPx  - Enoxaparin, routine mechanical measures  - monitor for clots and adverse efx of ppx measures  - US RLE today    #Leukocytosis-resolved  - Likely reactive, no evidence of infection  - Monitor CBC  -UA neg for dysuria 3/17    #Elevated AST  - Monitor CMP  3/10: AST 51, improving, clinically stable  3/13: ALT/AST elevated over weekend, stopped Tylenol, will give Tramadol if needed for pain, hospitalist input appreciated  3/14: US Abdomen showed no significant findings of liver, she will f/u with PCP regarding renal cysts as OP    #Depression  - on Sertraline 100 mg daily    #GI/Bowel Mgmt  - Senna,  Miralax PRN    #/Bladder Mgmt  - Voiding volitionally, Primafit added for nights so pt doesn't have to worry about drinking too much fluid and having to get up to void at night  - UA neg

## 2023-03-17 NOTE — PROGRESS NOTE ADULT - SUBJECTIVE AND OBJECTIVE BOX
CHIEF COMPLAINT:        VITALS  Vital Signs Last 24 Hrs  T(C): 36.8 (17 Mar 2023 08:30), Max: 37.1 (16 Mar 2023 20:53)  T(F): 98.2 (17 Mar 2023 08:30), Max: 98.8 (16 Mar 2023 20:53)  HR: 79 (17 Mar 2023 08:30) (70 - 79)  BP: 147/75 (17 Mar 2023 08:30) (136/66 - 147/75)  BP(mean): --  RR: 14 (17 Mar 2023 08:30) (14 - 16)  SpO2: 96% (17 Mar 2023 08:30) (94% - 96%)    Parameters below as of 16 Mar 2023 20:53  Patient On (Oxygen Delivery Method): room air      RECENT LABS                        10.0   7.82  )-----------( 338      ( 16 Mar 2023 06:34 )             31.3     03-16    141  |  107  |  20  ----------------------------<  94  4.0   |  27  |  0.77    Ca    8.4      16 Mar 2023 06:34    TPro  5.7<L>  /  Alb  2.6<L>  /  TBili  0.9  /  DBili  x   /  AST  83<H>  /  ALT  128<H>  /  AlkPhos  168<H>  03-16      LIVER FUNCTIONS - ( 16 Mar 2023 06:34 )  Alb: 2.6 g/dL / Pro: 5.7 g/dL / ALK PHOS: 168 U/L / ALT: 128 U/L / AST: 83 U/L / GGT: x           Urinalysis Basic - ( 17 Mar 2023 11:32 )    Color: Yellow / Appearance: Clear / S.015 / pH: x  Gluc: x / Ketone: Negative  / Bili: Negative / Urobili: Negative   Blood: x / Protein: 15 / Nitrite: Negative   Leuk Esterase: Negative / RBC: 0-4 /HPF / WBC 0-2 /HPF   Sq Epi: x / Non Sq Epi: Neg.-Few / Bacteria: Trace /HPF      CURRENT MEDICATIONS  MEDICATIONS  (STANDING):  enoxaparin Injectable 40 milliGRAM(s) SubCutaneous every 24 hours  pantoprazole    Tablet 40 milliGRAM(s) Oral before breakfast  polyethylene glycol 3350 17 Gram(s) Oral daily  sertraline 100 milliGRAM(s) Oral daily  traMADol 25 milliGRAM(s) Oral two times a day    MEDICATIONS  (PRN):  senna 2 Tablet(s) Oral at bedtime PRN Constipation      Continue comprehensive acute rehab program consisting of 3hrs/day of OT/PT and SLP.

## 2023-03-17 NOTE — PROGRESS NOTE ADULT - SUBJECTIVE AND OBJECTIVE BOX
CHIEF COMPLAINT:      HISTORY OF PRESENT ILLNESS  HPI:  81 yo F with PMH of HTN, depression, hearing loss and colon CA (, s/p chemo) presents to ED at [facility] on 3/7/23 with right hip pain after mechanical fall at home while watering plants.  No prior h/o falls or osteoporosis.  Pt w/ severe pain on movement.  BP elevated to 179/82, WBC 13.64. EKG showed sinus inderjit at 58 bpm, right hip/pelvis x-ray showed a comminuted, displaced, angulated right IT hip fracture, CXR showed no acute infiltrates. Ortho was consulted and pt underwent ORIF of right hip fx using locking intramedullary jailene later on 3/7 by Dr. Rojas without complications.  Postop course notable with some anemia (hgb 14.4-->9.6), but clinically stable and otherwise unremarkable.  Cleared for WBAT R Eder and LMWH VTE prophylaxis.  Leucocytosis resolved.  Patient was evaluated by physical therapy and occupational therapy and acute rehab was recommended.  Patient should f/u with Dr. Rojas and pcp outpatient. Cleared for transfer to the rehab unit on 3/9.   (09 Mar 2023 12:10)      PMH  PAST MEDICAL & SURGICAL HISTORY:  HTN (hypertension)  x 3 years, controlled      Colon cancer   had chemo.      Volvulus of ascending colon  2017  s/p colon surgery      Hard of hearing      S/P colon resection      S/P tonsillectomy and adenoidectomy      x -        REVIEW OF SYMPTOMS  [X] Constitutional WNL     [X] Cardio WNL            [X] Resp WNL           [X] GI WNL                          [X]  WNL                   [X] Heme WNL              [X] Endo WNL                     [X] Skin WNL                 [X] MSK WNL            [X] Neuro WNL                   [X] Cognitive WNL        [X] Psych WNL      VITALS  Vital Signs Last 24 Hrs  T(C): 36.8 (17 Mar 2023 08:30), Max: 37.1 (16 Mar 2023 20:53)  T(F): 98.2 (17 Mar 2023 08:30), Max: 98.8 (16 Mar 2023 20:53)  HR: 79 (17 Mar 2023 08:30) (70 - 79)  BP: 147/75 (17 Mar 2023 08:30) (136/66 - 147/75)  BP(mean): --  RR: 14 (17 Mar 2023 08:30) (14 - 16)  SpO2: 96% (17 Mar 2023 08:30) (94% - 96%)    Parameters below as of 16 Mar 2023 20:53  Patient On (Oxygen Delivery Method): room air          PHYSICAL EXAM  Constitutional - NAD, Comfortable  HEENT - NCAT, EOMI  Neck - Supple, No limited ROM  Chest - CTA bilaterally, No wheeze, No rhonchi, No crackles  Cardiovascular - RRR, S1S2, No murmurs  Abdomen - BS+, Soft, NTND  Extremities - No C/C/E, No calf tenderness   Skin-no rash  Woumds-      Neurologic Exam -                   HIF  - Awake, Alert, AAO to self, place, date, year, situation      No aphasia, normal attention, normal concentration, no apraxia, agnosia     Cranial Nerves - CN 2-12 intact     Motor - No focal deficits                            Sensory - Intact to LT,PP,Temprature,JPS, vibration                      Cortical sensory modalities intact     Reflexes - DTR Intact     Toes are flexor     Coordination/dysmetria - FTN intact             Balance - WNL Static and dynamic     Psychiatric - Mood stable, Affect WNL         FUNCTIONAL PROGRESS  Gait -   ADLs -   Transfers -  Functional transfer -     RECENT LABS                        10.0   7.82  )-----------( 338      ( 16 Mar 2023 06:34 )             31.3     03-16    141  |  107  |  20  ----------------------------<  94  4.0   |  27  |  0.77    Ca    8.4      16 Mar 2023 06:34    TPro  5.7<L>  /  Alb  2.6<L>  /  TBili  0.9  /  DBili  x   /  AST  83<H>  /  ALT  128<H>  /  AlkPhos  168<H>  03-16      LIVER FUNCTIONS - ( 16 Mar 2023 06:34 )  Alb: 2.6 g/dL / Pro: 5.7 g/dL / ALK PHOS: 168 U/L / ALT: 128 U/L / AST: 83 U/L / GGT: x           Urinalysis Basic - ( 17 Mar 2023 11:32 )    Color: Yellow / Appearance: Clear / S.015 / pH: x  Gluc: x / Ketone: Negative  / Bili: Negative / Urobili: Negative   Blood: x / Protein: 15 / Nitrite: Negative   Leuk Esterase: Negative / RBC: 0-4 /HPF / WBC 0-2 /HPF   Sq Epi: x / Non Sq Epi: Neg.-Few / Bacteria: Trace /HPF      CHIEF COMPLAINT:      HISTORY OF PRESENT ILLNESS  HPI:  81 yo F with PMH of HTN, depression, hearing loss and colon CA (, s/p chemo) presents to ED at [facility] on 3/7/23 with right hip pain after mechanical fall at home while watering plants.  No prior h/o falls or osteoporosis.  Pt w/ severe pain on movement.  BP elevated to 179/82, WBC 13.64. EKG showed sinus inderjit at 58 bpm, right hip/pelvis x-ray showed a comminuted, displaced, angulated right IT hip fracture, CXR showed no acute infiltrates. Ortho was consulted and pt underwent ORIF of right hip fx using locking intramedullary jailene later on 3/7 by Dr. Rojas without complications.  Postop course notable with some anemia (hgb 14.4-->9.6), but clinically stable and otherwise unremarkable.  Cleared for WBAT R Eder and LMWH VTE prophylaxis.  Leucocytosis resolved.  Patient was evaluated by physical therapy and occupational therapy and acute rehab was recommended.  Patient should f/u with Dr. Rojas and pcp outpatient. Cleared for transfer to the rehab unit on 3/9.   (09 Mar 2023 12:10)      PMH  PAST MEDICAL & SURGICAL HISTORY:  HTN (hypertension)  x 3 years, controlled      Colon cancer   had chemo.      Volvulus of ascending colon  2017  s/p colon surgery      Hard of hearing      S/P colon resection      S/P tonsillectomy and adenoidectomy      x -        REVIEW OF SYMPTOMS  [X] Constitutional WNL     [X] Cardio WNL            [X] Resp WNL           [X] GI WNL                          [X]  WNL                   [X] Heme WNL              [X] Endo WNL                     [X] Skin WNL                 [X] MSK WNL            [X] Neuro WNL                   [X] Cognitive WNL        [X] Psych WNL      VITALS  Vital Signs Last 24 Hrs  T(C): 36.8 (17 Mar 2023 08:30), Max: 37.1 (16 Mar 2023 20:53)  T(F): 98.2 (17 Mar 2023 08:30), Max: 98.8 (16 Mar 2023 20:53)  HR: 79 (17 Mar 2023 08:30) (70 - 79)  BP: 147/75 (17 Mar 2023 08:30) (136/66 - 147/75)  BP(mean): --  RR: 14 (17 Mar 2023 08:30) (14 - 16)  SpO2: 96% (17 Mar 2023 08:30) (94% - 96%)    Parameters below as of 16 Mar 2023 20:53  Patient On (Oxygen Delivery Method): room air          PHYSICAL EXAM  Constitutional - NAD, Comfortable  HEENT - NCAT, EOMI  Neck - Supple, No limited ROM  Chest - CTA bilaterally, No wheeze, No rhonchi, No crackles  Cardiovascular - RRR, S1S2, No murmurs  Abdomen - BS+, Soft, NTND  Extremities - No C/C/E, No calf tenderness   Skin-no rash  Woumds-      Neurologic Exam -                   HIF  - Awake, Alert, AAO to self, place, date, year, situation      No aphasia, normal attention, normal concentration, no apraxia, agnosia     Cranial Nerves - CN 2-12 intact     Motor - No focal deficits                            Sensory - Intact to LT,PP,Temprature,JPS, vibration                      Cortical sensory modalities intact     Reflexes - DTR Intact     Toes are flexor     Coordination/dysmetria - FTN intact             Balance - WNL Static and dynamic     Psychiatric - Mood stable, Affect WNL         FUNCTIONAL PROGRESS  Gait -   ADLs -   Transfers -  Functional transfer -     RECENT LABS                        10.0   7.82  )-----------( 338      ( 16 Mar 2023 06:34 )             31.3     03-16    141  |  107  |  20  ----------------------------<  94  4.0   |  27  |  0.77    Ca    8.4      16 Mar 2023 06:34    TPro  5.7<L>  /  Alb  2.6<L>  /  TBili  0.9  /  DBili  x   /  AST  83<H>  /  ALT  128<H>  /  AlkPhos  168<H>  03-16      LIVER FUNCTIONS - ( 16 Mar 2023 06:34 )  Alb: 2.6 g/dL / Pro: 5.7 g/dL / ALK PHOS: 168 U/L / ALT: 128 U/L / AST: 83 U/L / GGT: x           Urinalysis Basic - ( 17 Mar 2023 11:32 )    Color: Yellow / Appearance: Clear / S.015 / pH: x  Gluc: x / Ketone: Negative  / Bili: Negative / Urobili: Negative   Blood: x / Protein: 15 / Nitrite: Negative   Leuk Esterase: Negative / RBC: 0-4 /HPF / WBC 0-2 /HPF   Sq Epi: x / Non Sq Epi: Neg.-Few / Bacteria: Trace /HPF                    RADIOLOGY/OTHER RESULTS      CURRENT MEDICATIONS  MEDICATIONS  (STANDING):  enoxaparin Injectable 40 milliGRAM(s) SubCutaneous every 24 hours  pantoprazole    Tablet 40 milliGRAM(s) Oral before breakfast  polyethylene glycol 3350 17 Gram(s) Oral daily  sertraline 100 milliGRAM(s) Oral daily  traMADol 25 milliGRAM(s) Oral two times a day    MEDICATIONS  (PRN):  senna 2 Tablet(s) Oral at bedtime PRN Constipation      ASSESSMENT & PLAN          GI/Bowel Management - Dulcolax PRN, Fleet PRN   Management - Toilet Q2  Skin - Turn Q2  Pain - Tylenol PRN  DVT PPX - TEDs, SCDs  Diet -     Continue comprehensive acute rehab program consisting of 3hrs/day of OT/PT and SLP.              RADIOLOGY/OTHER RESULTS      CURRENT MEDICATIONS  MEDICATIONS  (STANDING):  enoxaparin Injectable 40 milliGRAM(s) SubCutaneous every 24 hours  pantoprazole    Tablet 40 milliGRAM(s) Oral before breakfast  polyethylene glycol 3350 17 Gram(s) Oral daily  sertraline 100 milliGRAM(s) Oral daily  traMADol 25 milliGRAM(s) Oral two times a day    MEDICATIONS  (PRN):  senna 2 Tablet(s) Oral at bedtime PRN Constipation      ASSESSMENT & PLAN          GI/Bowel Management - Dulcolax PRN, Fleet PRN   Management - Toilet Q2  Skin - Turn Q2  Pain - Tylenol PRN  DVT PPX - TEDs, SCDs  Diet -     Continue comprehensive acute rehab program consisting of 3hrs/day of OT/PT and SLP. CHIEF COMPLAINT: s/p hip fracture on right      S: 910 right hip pain and swelling, had BM yesterday, slept well, urinary frequency-UA neg, afebrile.   O: aquacell removed-incisions healing well-staples in place. Ecchymosis posterior thigh.   Right lower limb edema-US and XR follow up ordered.   Heart: RRR      VITALS  Vital Signs Last 24 Hrs  T(C): 36.8 (17 Mar 2023 08:30), Max: 37.1 (16 Mar 2023 20:53)  T(F): 98.2 (17 Mar 2023 08:30), Max: 98.8 (16 Mar 2023 20:53)  HR: 79 (17 Mar 2023 08:30) (70 - 79)  BP: 147/75 (17 Mar 2023 08:30) (136/66 - 147/75)  BP(mean): --  RR: 14 (17 Mar 2023 08:30) (14 - 16)  SpO2: 96% (17 Mar 2023 08:30) (94% - 96%)    Parameters below as of 16 Mar 2023 20:53  Patient On (Oxygen Delivery Method): room air      PHYSICAL EXAM  Constitutional - NAD, Comfortable  HEENT - NCAT, EOMI  Neck - Supple, No limited ROM  Chest - CTA bilaterally, No wheeze, No rhonchi, No crackles  Cardiovascular - RRR, S1S2, No murmurs  Abdomen - BS+, Soft, NTND  Extremities - RLE edema, some ecchymosis posteriorly  Skin-no rash  Wounds- right hip aquacell bandage x3-removed-staples in place.      RECENT LABS                        10.0   7.82  )-----------( 338      ( 16 Mar 2023 06:34 )             31.3     03-16    141  |  107  |  20  ----------------------------<  94  4.0   |  27  |  0.77    Ca    8.4      16 Mar 2023 06:34    TPro  5.7<L>  /  Alb  2.6<L>  /  TBili  0.9  /  DBili  x   /  AST  83<H>  /  ALT  128<H>  /  AlkPhos  168<H>  03-16      LIVER FUNCTIONS - ( 16 Mar 2023 06:34 )  Alb: 2.6 g/dL / Pro: 5.7 g/dL / ALK PHOS: 168 U/L / ALT: 128 U/L / AST: 83 U/L / GGT: x           Urinalysis Basic - ( 17 Mar 2023 11:32 )    Color: Yellow / Appearance: Clear / S.015 / pH: x  Gluc: x / Ketone: Negative  / Bili: Negative / Urobili: Negative   Blood: x / Protein: 15 / Nitrite: Negative   Leuk Esterase: Negative / RBC: 0-4 /HPF / WBC 0-2 /HPF   Sq Epi: x / Non Sq Epi: Neg.-Few / Bacteria: Trace /HPF

## 2023-03-18 PROCEDURE — 99232 SBSQ HOSP IP/OBS MODERATE 35: CPT

## 2023-03-18 RX ADMIN — TRAMADOL HYDROCHLORIDE 25 MILLIGRAM(S): 50 TABLET ORAL at 05:48

## 2023-03-18 RX ADMIN — PANTOPRAZOLE SODIUM 40 MILLIGRAM(S): 20 TABLET, DELAYED RELEASE ORAL at 05:47

## 2023-03-18 RX ADMIN — TRAMADOL HYDROCHLORIDE 25 MILLIGRAM(S): 50 TABLET ORAL at 18:44

## 2023-03-18 RX ADMIN — POLYETHYLENE GLYCOL 3350 17 GRAM(S): 17 POWDER, FOR SOLUTION ORAL at 12:42

## 2023-03-18 RX ADMIN — SERTRALINE 100 MILLIGRAM(S): 25 TABLET, FILM COATED ORAL at 12:42

## 2023-03-18 RX ADMIN — TRAMADOL HYDROCHLORIDE 25 MILLIGRAM(S): 50 TABLET ORAL at 17:44

## 2023-03-18 RX ADMIN — ENOXAPARIN SODIUM 40 MILLIGRAM(S): 100 INJECTION SUBCUTANEOUS at 17:44

## 2023-03-18 NOTE — PROGRESS NOTE ADULT - SUBJECTIVE AND OBJECTIVE BOX
No overnight events.  pain controlled, slept well     REVIEW OF SYSTEMS  Constitutional - No fever,  No fatigue  Neurological - No headaches, No loss of strength  Musculoskeletal - No joint pain, No joint swelling, No muscle pain    VITALS  T(C): 36.7 (23 @ 09:07), Max: 37.6 (23 @ 20:07)  HR: 69 (23 @ 09:07) (69 - 79)  BP: 118/70 (23 @ 09:07) (118/70 - 128/72)  RR: 16 (23 @ 09:07) (14 - 16)  SpO2: 94% (23 @ 09:07) (94% - 94%)  Wt(kg): --       MEDICATIONS   enoxaparin Injectable 40 milliGRAM(s) every 24 hours  pantoprazole    Tablet 40 milliGRAM(s) before breakfast  polyethylene glycol 3350 17 Gram(s) daily  senna 2 Tablet(s) at bedtime PRN  sertraline 100 milliGRAM(s) daily  traMADol 25 milliGRAM(s) two times a day      RECENT LABS/IMAGING            Urinalysis Basic - ( 17 Mar 2023 11:32 )    Color: Yellow / Appearance: Clear / S.015 / pH: x  Gluc: x / Ketone: Negative  / Bili: Negative / Urobili: Negative   Blood: x / Protein: 15 / Nitrite: Negative   Leuk Esterase: Negative / RBC: 0-4 /HPF / WBC 0-2 /HPF   Sq Epi: x / Non Sq Epi: Neg.-Few / Bacteria: Trace /HPF                ---------  PHYSICAL EXAM  Constitutional - NAD, Comfortable  Pulm - Breathing comfortably, No wheezing  Abd - Soft, NTND  Extremities - No edema, No calf tenderness  Neurologic Exam -                    Cognitive - Awake, Alert, oriented x 3     Communication - Fluent     Motor - moves all ext      Sensory - Intact to LT  Psychiatric - Mood WNL, Affect WNL    ASSESSMENT/PLAN  82y Female h/o HTN colon cancer with functional deficits after fall, right IT fracture  s/p ORIF with IM jailene, WBAT RLE   Continue current medical management  Pain - Tylenol PRN  DVT PPX - lovenox RLE US negative for DVT   Continue 3hrs a day of comprehensive rehab program.

## 2023-03-19 PROCEDURE — 99232 SBSQ HOSP IP/OBS MODERATE 35: CPT

## 2023-03-19 RX ADMIN — POLYETHYLENE GLYCOL 3350 17 GRAM(S): 17 POWDER, FOR SOLUTION ORAL at 12:34

## 2023-03-19 RX ADMIN — SERTRALINE 100 MILLIGRAM(S): 25 TABLET, FILM COATED ORAL at 12:34

## 2023-03-19 RX ADMIN — TRAMADOL HYDROCHLORIDE 25 MILLIGRAM(S): 50 TABLET ORAL at 06:22

## 2023-03-19 RX ADMIN — TRAMADOL HYDROCHLORIDE 25 MILLIGRAM(S): 50 TABLET ORAL at 18:50

## 2023-03-19 RX ADMIN — TRAMADOL HYDROCHLORIDE 25 MILLIGRAM(S): 50 TABLET ORAL at 17:50

## 2023-03-19 RX ADMIN — ENOXAPARIN SODIUM 40 MILLIGRAM(S): 100 INJECTION SUBCUTANEOUS at 17:51

## 2023-03-19 RX ADMIN — PANTOPRAZOLE SODIUM 40 MILLIGRAM(S): 20 TABLET, DELAYED RELEASE ORAL at 06:22

## 2023-03-19 NOTE — PROGRESS NOTE ADULT - ASSESSMENT
82F with PMH HTN, depression, colon CA (2002, s/p chemo) presents to [facility] with right hip pain after mechanical fall, found to have right hip fracture. s/p ORIF. Now admitted to [facility] for initiation of multidisciplinary rehab program.     #Right Proximal Femur Fracture  -s/p ORIF IM nailing on 3/7  -Continue comprehensive rehab program   -Continue pain control with Tramadol BID  -WBAT RLE    #Transaminitis - downtrending  -Abd ultrasound performed  -Avoid hepatotoxic medications  -Follow up routine CMP    #Anemia  Likely anemia of chronic disease with post op anemia  -H/H stable, no overt signs of bleeding  -Follow up routine CBC    #HTN  -Amlodipine held due to orthostatic hypotension  -PO hydration encouraged  -Check orthostatics  -Monitor vitals    #Depression  -Continue Sertraline     #Leukocytosis-resolved  -Likely reactive  -Monitor CBC    #Prophylactic Measure  -DVT ppx: Lovenox  -GI ppx: Protonix  -Bowel regimen

## 2023-03-19 NOTE — PROGRESS NOTE ADULT - SUBJECTIVE AND OBJECTIVE BOX
No overnight events.      REVIEW OF SYSTEMS  Constitutional - No fever,  No fatigue  Neurological - No headaches, No loss of strength  Musculoskeletal - No joint pain, No joint swelling, No muscle pain    VITALS  T(C): 36.9 (23 @ 08:37), Max: 36.9 (23 @ 08:37)  HR: 65 (23 @ 08:37) (65 - 68)  BP: 138/80 (23 @ 08:37) (138/80 - 152/72)  RR: 16 (23 @ 08:37) (16 - 16)  SpO2: 96% (23 @ 08:37) (96% - 96%)  Wt(kg): --       MEDICATIONS   enoxaparin Injectable 40 milliGRAM(s) every 24 hours  pantoprazole    Tablet 40 milliGRAM(s) before breakfast  polyethylene glycol 3350 17 Gram(s) daily  senna 2 Tablet(s) at bedtime PRN  sertraline 100 milliGRAM(s) daily  traMADol 25 milliGRAM(s) two times a day      RECENT LABS/IMAGING            Urinalysis Basic - ( 17 Mar 2023 11:32 )    Color: Yellow / Appearance: Clear / S.015 / pH: x  Gluc: x / Ketone: Negative  / Bili: Negative / Urobili: Negative   Blood: x / Protein: 15 / Nitrite: Negative   Leuk Esterase: Negative / RBC: 0-4 /HPF / WBC 0-2 /HPF   Sq Epi: x / Non Sq Epi: Neg.-Few / Bacteria: Trace /HPF          ---------  PHYSICAL EXAM  Constitutional - NAD, Comfortable  Pulm - Breathing comfortably  Abd - Soft, NTND  Extremities - No edema, No calf tenderness  Neurologic Exam -                    Cognitive - Awake, Alert, oriented x 3     Communication - Fluent     Motor - moves all ext      Sensory - Intact to LT  Psychiatric - Mood WNL, Affect WNL    ASSESSMENT/PLAN  82y Female h/o HTN colon cancer with functional deficits after fall, right IT fracture  s/p ORIF with IM jailene, WBAT RLE   Continue current medical management  Pain - Tylenol PRN  DVT PPX - lovenox RLE US negative for DVT   Continue 3hrs a day of comprehensive rehab program.

## 2023-03-19 NOTE — PROGRESS NOTE ADULT - SUBJECTIVE AND OBJECTIVE BOX
Patient is a 82y old  Female who presents with a chief complaint of s/p right hip fx repair (19 Mar 2023 09:29)      Patient seen and examined at bedside. No overnight events.  Reports right hip pain well controlled with current regimen    REVIEW OF SYSTEMS:  CONSTITUTIONAL: No fever or chills  CARDIOVASCULAR: No chest pain, palpitations    ALLERGIES:  No Known Allergies    MEDICATIONS  (STANDING):  enoxaparin Injectable 40 milliGRAM(s) SubCutaneous every 24 hours  pantoprazole    Tablet 40 milliGRAM(s) Oral before breakfast  polyethylene glycol 3350 17 Gram(s) Oral daily  sertraline 100 milliGRAM(s) Oral daily  traMADol 25 milliGRAM(s) Oral two times a day    MEDICATIONS  (PRN):  senna 2 Tablet(s) Oral at bedtime PRN Constipation    Vital Signs Last 24 Hrs  T(F): 98.5 (19 Mar 2023 08:37), Max: 98.5 (19 Mar 2023 08:37)  HR: 65 (19 Mar 2023 08:37) (65 - 68)  BP: 138/80 (19 Mar 2023 08:37) (138/80 - 152/72)  RR: 16 (19 Mar 2023 08:37) (16 - 16)  SpO2: 96% (19 Mar 2023 08:37) (96% - 96%)  I&O's Summary        PHYSICAL EXAM:  GENERAL: NAD  HEENT:  AT/NC, anicteric, moist mucous membranes, EOMI, PERRL, no lid-lag, conjunctiva and sclera clear  CHEST/LUNG:  CTA b/l, no rales, wheezes, or rhonchi,  normal respiratory effort, no intercostal retractions  HEART:  RRR, S1, S2, no murmurs; trace edema RLE   ABDOMEN:  BS+, soft, nontender, nondistended  MSK/EXTREMITIES: palpable peripheral pulses, no clubbing or cyanosis  NERVOUS SYSTEM: answers questions and follows commands appropriately A&Ox3 grossly moves all extremities   PSYCH: Appropriate affect, Alert & Awake; Good judgement    LABS: Personally reviewed                  Urinalysis Basic - ( 17 Mar 2023 11:32 )    Color: Yellow / Appearance: Clear / S.015 / pH: x  Gluc: x / Ketone: Negative  / Bili: Negative / Urobili: Negative   Blood: x / Protein: 15 / Nitrite: Negative   Leuk Esterase: Negative / RBC: 0-4 /HPF / WBC 0-2 /HPF   Sq Epi: x / Non Sq Epi: Neg.-Few / Bacteria: Trace /HPF        COVID-19 PCR: Baldemar (23 @ 10:50)      RADIOLOGY & ADDITIONAL TESTS: Personally reviewed

## 2023-03-20 LAB
ALBUMIN SERPL ELPH-MCNC: 2.5 G/DL — LOW (ref 3.3–5)
ALP SERPL-CCNC: 333 U/L — HIGH (ref 40–120)
ALT FLD-CCNC: 147 U/L — HIGH (ref 10–45)
ANION GAP SERPL CALC-SCNC: 5 MMOL/L — SIGNIFICANT CHANGE UP (ref 5–17)
AST SERPL-CCNC: 106 U/L — HIGH (ref 10–40)
BILIRUB SERPL-MCNC: 0.7 MG/DL — SIGNIFICANT CHANGE UP (ref 0.2–1.2)
BUN SERPL-MCNC: 18 MG/DL — SIGNIFICANT CHANGE UP (ref 7–23)
CALCIUM SERPL-MCNC: 8.3 MG/DL — LOW (ref 8.4–10.5)
CHLORIDE SERPL-SCNC: 106 MMOL/L — SIGNIFICANT CHANGE UP (ref 96–108)
CO2 SERPL-SCNC: 28 MMOL/L — SIGNIFICANT CHANGE UP (ref 22–31)
CREAT SERPL-MCNC: 0.63 MG/DL — SIGNIFICANT CHANGE UP (ref 0.5–1.3)
EGFR: 87 ML/MIN/1.73M2 — SIGNIFICANT CHANGE UP
GLUCOSE SERPL-MCNC: 94 MG/DL — SIGNIFICANT CHANGE UP (ref 70–99)
HCT VFR BLD CALC: 33.1 % — LOW (ref 34.5–45)
HGB BLD-MCNC: 10.7 G/DL — LOW (ref 11.5–15.5)
MCHC RBC-ENTMCNC: 32.3 GM/DL — SIGNIFICANT CHANGE UP (ref 32–36)
MCHC RBC-ENTMCNC: 32.8 PG — SIGNIFICANT CHANGE UP (ref 27–34)
MCV RBC AUTO: 101.5 FL — HIGH (ref 80–100)
NRBC # BLD: 0 /100 WBCS — SIGNIFICANT CHANGE UP (ref 0–0)
PLATELET # BLD AUTO: 466 K/UL — HIGH (ref 150–400)
POTASSIUM SERPL-MCNC: 4.2 MMOL/L — SIGNIFICANT CHANGE UP (ref 3.5–5.3)
POTASSIUM SERPL-SCNC: 4.2 MMOL/L — SIGNIFICANT CHANGE UP (ref 3.5–5.3)
PROT SERPL-MCNC: 5.7 G/DL — LOW (ref 6–8.3)
RBC # BLD: 3.26 M/UL — LOW (ref 3.8–5.2)
RBC # FLD: 16.4 % — HIGH (ref 10.3–14.5)
SODIUM SERPL-SCNC: 139 MMOL/L — SIGNIFICANT CHANGE UP (ref 135–145)
WBC # BLD: 6.9 K/UL — SIGNIFICANT CHANGE UP (ref 3.8–10.5)
WBC # FLD AUTO: 6.9 K/UL — SIGNIFICANT CHANGE UP (ref 3.8–10.5)

## 2023-03-20 PROCEDURE — 99232 SBSQ HOSP IP/OBS MODERATE 35: CPT

## 2023-03-20 RX ADMIN — TRAMADOL HYDROCHLORIDE 25 MILLIGRAM(S): 50 TABLET ORAL at 05:46

## 2023-03-20 RX ADMIN — PANTOPRAZOLE SODIUM 40 MILLIGRAM(S): 20 TABLET, DELAYED RELEASE ORAL at 05:43

## 2023-03-20 RX ADMIN — ENOXAPARIN SODIUM 40 MILLIGRAM(S): 100 INJECTION SUBCUTANEOUS at 17:17

## 2023-03-20 RX ADMIN — TRAMADOL HYDROCHLORIDE 25 MILLIGRAM(S): 50 TABLET ORAL at 17:16

## 2023-03-20 RX ADMIN — TRAMADOL HYDROCHLORIDE 25 MILLIGRAM(S): 50 TABLET ORAL at 07:00

## 2023-03-20 RX ADMIN — POLYETHYLENE GLYCOL 3350 17 GRAM(S): 17 POWDER, FOR SOLUTION ORAL at 11:26

## 2023-03-20 RX ADMIN — SERTRALINE 100 MILLIGRAM(S): 25 TABLET, FILM COATED ORAL at 11:26

## 2023-03-20 NOTE — CHART NOTE - NSCHARTNOTEFT_GEN_A_CORE
Hospitalist f/u appreciated.  Pt recommended to undergo blood transfusion after repeat PM labs this AM given H/H trajectory, symptoms/clinical issues.                        7.9    x     )-----------( x        ( 10 Mar 2023 15:25 )             24.4     Pt seen again this afternoon at 4PM with Marie Velasquez NP.  Pt's day discussed.  Risks/benefits of blood transfusion discussed, risks include reactions and infections, including unknown infections.  All pt questions answered.  Recommended for transfusion to improve her clinical recovery.  Pt agreeable.  Written consent form signed and entered into paper chart.  For T&S, blood transfusion later today with nsg close monitoring of prbc transfusion per facility protocol.  F/u labs over w/e.  Currently, do not hold a high suspicion of operative site bleeding given hx, exam.  If H/H continue to drop, will require further evaluation, including, e.g., consideration of imaging at the operative area.
Nutrition Follow Up Note  Hospital Course   (Per Electronic Medical Record)    Source:  Patient [X]  Medical Record [X]      Diet:   Regular Diet (IDDSI Level 7) w/ Thin Liquids (IDDSI Level 0)  Tolerates Diet Consistency Well  No Chewing/Swallowing Difficulties  No Recent Nausea, Vomiting, Diarrhea or Constipation (as Per Patient)  Consumes % of Meals (as Per Documentation) - States Good PO Intake/Appetite (Per Patient)    Enteral/Parenteral Nutrition: Not Applicable    Current Weight:   Obtain New Weight  Obtain Weights Weekly    Pertinent Medications: MEDICATIONS  (STANDING):  enoxaparin Injectable 40 milliGRAM(s) SubCutaneous every 24 hours  pantoprazole    Tablet 40 milliGRAM(s) Oral before breakfast  polyethylene glycol 3350 17 Gram(s) Oral daily  sertraline 100 milliGRAM(s) Oral daily  traMADol 25 milliGRAM(s) Oral two times a day    MEDICATIONS  (PRN):  oxyCODONE    IR 5 milliGRAM(s) Oral every 6 hours PRN Severe Pain (7 - 10)  senna 2 Tablet(s) Oral at bedtime PRN Constipation    Pertinent Labs:  03-16 Na141 mmol/L Glu 94 mg/dL K+ 4.0 mmol/L Cr  0.77 mg/dL BUN 20 mg/dL 03-16 Alb 2.6 g/dL<L>    Skin: No Pressure Ulcers  Multiple Surgical Incisions  (as Per Nursing Flow Sheet)     Edema: +2 Edema Noted to Right Hip  (as Per Documentation)     Last Bowel Movement: on 3/15    Estimated Needs:   [X] No Change Since Previous Assessment    Previous Nutrition Diagnosis:   No Active Nutrition Dx @ This Present Time    Nutrition Diagnosis is [X] Not Applicable     New Nutrition Diagnosis: [X] Not Applicable    Interventions:   1. Recommend Continue Nutrition Plan of Care     Monitoring & Evaluation:   [X] Weights   [X] PO Intake   [X] Skin Integrity   [X] Follow Up (Per Protocol)  [X] Tolerance to Diet Prescription   [X] Other: Labs     Registered Dietitian/Nutritionist Remains Available.  Maykel uBck RDN    Phone# [phone]
[facility] Interdisciplinary Plan of Care    REHABILITATION DIAGNOSIS:  Displaced intertrochanteric fracture of right femur, initial encounter for closed fracture    COMORBIDITIES/COMPLICATING CONDITIONS IMPACTING REHABILITATION:  HEALTH ISSUES - PROBLEM Dx:    PAST MEDICAL & SURGICAL HISTORY:  HTN (hypertension) x 3 years, controlled  Colon cancer 2002 had chemo.  Volvulus of ascending colon 7/2017 s/p colon surgery  S/P colon resection  Hard of hearing  S/P tonsillectomy and adenoidectomy      Based upon consideration of the patient's impairments, functional status, complicating conditions and any other contributing factors and after information garnered from the assessments of all therapy disciplines involved in treating the patient and other pertinent clinicians:    INTERDISCIPLINARY REHABILITATION INTERVENTIONS:    [ X  ] Transfer Training  [ X  ] Bed Mobility  [ X  ] Therapeutic Exercise  [ X ] Balance/Coordination Exercises  [ X ] Locomotion retraining  [ X  ] Stairs  [  X ] Functional Transfer Training  [ X  ] Bowel/Bladder program  [ X  ] Pain Management  [ X  ] Skin/Wound Care  [ X  ] Visual/Perceptual Training  [ X  ] Therapeutic Recreation Activities  [  X ] Neuromuscular Re-education  [ X  ] Activities of Daily Living  [ X  ] Speech Exercise  [X   ] Swallowing Exercises  [   ] Vital Stim  [   ] Dietary Supplements  [   ] Calorie Count  [ X  ] Cognitive Exercises  [  X ] Congnitive/Linguistic Treatment  [  x ] Behavior Program  [  x ] Neuropsych Therapy  [ X  ] Patient/Family Counseling  [ X ] Family Training  [ X  ] Community Re-entry  [   ] Orthotic Evaluation  [   ] Prosthetic Eval/Training    MEDICAL PROGNOSIS: good    REHAB POTENTIAL:  Good    EXPECTED DAILY THERAPY:  3 hours/day    ESTIMATED LOS:  [  ] 5-7 Days  [  ] 7-10 Days  [X] 10- 14 Days  [  ] 14- 18 Days  [  ] 18- 21 Days    ESTIMATED DISPOSITION:  [  ] Home   [  ] Home with Outpatient Therapies  [X] Home with Home Therapies  [  ] Assisted Living  [  ] Nursing Home  [  ] Long Term Acute Care    INTERDISCIPLINARY FUNCTIONAL OUTCOMES/GOALS:         Gait/Mobility: 6       Transfers: 6       ADLs: 6       Functional Transfers: 6       Medication Management: 7       Communication: 7       Cognitive: 7       Dysphagia: 7       Bladder: 6       Bowel: 6     Functional Independent Measures:   7 = Independent  6 = Modified Independent  5 = Supervision  4 = Minimal Assist/ Contact Guard  3 = Moderate Assistance  2 = Maximum Assistance  1 = Total Assistance  0 = Unable to assess
IDT meeting on 3/13    SW: PH 3ste, 1 sti, aide    OT:  eating set up  grooming set up  UBD/LBD CG  toileting min A  tub/shower min A  bathing min A    PT:  amb 40ft min A RW  transfers min/mod A  stairs    SLP na  diet  cog  memory    tentative dc home c aide hc 3/23
IDT meeting on 3/20    SW: PH 3ste, 1 sti, aide    OT:  eating set up  grooming set up  UBD/LBD CG  toileting CG  tub/shower min A  bathing min A/CG    PT:  amb 120ft CS RW  transfers min/CG/CS   stairs 8 CG    SLP na  diet  cog  memory    tentative dc home c aide hc 3/23.
RRT this AM for syncopal/new-syncopal episode. Patient was working with therapy when she began to experience lightheadedness. She became unresponsive and was brought back to bed, where her mental status improved. Seen at the bedside with Dr. Hill and Dr. Hutchins. Patient was awake and alert; answering questions appropriately. Reports feeling lightheaded and nauseous when she was OOB, but symptoms have improved since being brought back. VS unremarkable; SBP 110s, was in 140s prior to being brought to therapy.    Appears back to baseline; primary team to further evaluate. Will obtain ECG and troponin as add-on to AM blood work. Consider FRANCES stockings and abdominal binder when OOB.

## 2023-03-20 NOTE — PROGRESS NOTE ADULT - SUBJECTIVE AND OBJECTIVE BOX
CHIEF COMPLAINT: s/p hip fracture on right      S: NAD overnight, Improved right hip pain and swelling, had BM, slept well, urinary frequency improved.   O: Aquacell removed-incisions healing well-staples in place. Ecchymosis posterior thigh.   Right lower limb edema-US and XR follow up neg new findings.   Heart: RRR      VITALS  Vital Signs Last 24 Hrs  T(C): 36.7 (20 Mar 2023 09:02), Max: 36.7 (20 Mar 2023 09:02)  T(F): 98.1 (20 Mar 2023 09:02), Max: 98.1 (20 Mar 2023 09:02)  HR: 77 (20 Mar 2023 09:02) (66 - 77)  BP: 122/73 (20 Mar 2023 09:02) (122/73 - 130/82)  BP(mean): --  RR: 14 (20 Mar 2023 09:02) (14 - 18)  SpO2: 99% (20 Mar 2023 09:02) (98% - 99%)    Parameters below as of 20 Mar 2023 00:33  Patient On (Oxygen Delivery Method): room air      RECENT LABS                        10.7   6.90  )-----------( 466      ( 20 Mar 2023 06:10 )             33.1     03-20    139  |  106  |  18  ----------------------------<  94  4.2   |  28  |  0.63    Ca    8.3<L>      20 Mar 2023 06:10    TPro  5.7<L>  /  Alb  2.5<L>  /  TBili  0.7  /  DBili  x   /  AST  106<H>  /  ALT  147<H>  /  AlkPhos  333<H>  03-20      LIVER FUNCTIONS - ( 20 Mar 2023 06:10 )  Alb: 2.5 g/dL / Pro: 5.7 g/dL / ALK PHOS: 333 U/L / ALT: 147 U/L / AST: 106 U/L / GGT: x               PHYSICAL EXAM  Constitutional - NAD, Comfortable  HEENT - NCAT, EOMI  Neck - Supple, No limited ROM  Chest - CTA bilaterally, No wheeze, No rhonchi, No crackles  Cardiovascular - RRR, S1S2, No murmurs  Abdomen - BS+, Soft, NTND  Extremities - RLE edema, some ecchymosis posteriorly  Skin-no rash  Wounds- right hip staples in place.      CURRENT MEDICATIONS  MEDICATIONS  (STANDING):  enoxaparin Injectable 40 milliGRAM(s) SubCutaneous every 24 hours  pantoprazole    Tablet 40 milliGRAM(s) Oral before breakfast  polyethylene glycol 3350 17 Gram(s) Oral daily  sertraline 100 milliGRAM(s) Oral daily  traMADol 25 milliGRAM(s) Oral two times a day    MEDICATIONS  (PRN):  senna 2 Tablet(s) Oral at bedtime PRN Constipation    Continue comprehensive acute rehab program consisting of 3hrs/day of OT/PT.

## 2023-03-20 NOTE — CHART NOTE - NSCHARTNOTESELECT_GEN_ALL_CORE
IDT/Event Note
IDT/Event Note
RRT/Event Note
Event Note
Nutrition Services
PRBC transfusion planned/ordered/Event Note

## 2023-03-20 NOTE — PROGRESS NOTE ADULT - ASSESSMENT
82F with PMH HTN, depression, colon CA (2002, s/p chemo) presents to [facility] with right hip pain after mechanical fall, found to have right hip fracture. s/p ORIF. Now admitted to [facility] for initiation of multidisciplinary rehab program.     #Right Proximal Femur Fracture  -s/p ORIF IM nailing on 3/7  -Continue comprehensive rehab program   -Continue pain control with Tramadol BID  -WBAT RLE    #Transaminitis - downtrending  -Abd ultrasound performed  -Avoid hepatotoxic medications  -Follow up routine CMP    #Anemia  Likely anemia of chronic disease with post op anemia  -H/H stable, no overt signs of bleeding  -Follow up routine CBC    #HTN  -Amlodipine held due to orthostatic hypotension  -PO hydration encouraged  -Check orthostatics  -Monitor vitals    #Depression  -Continue Sertraline     #Leukocytosis-resolved  -Likely reactive  -Monitor CBC    #Prophylactic Measure  -DVT ppx: Lovenox  -GI ppx: Protonix  -Bowel regimen

## 2023-03-20 NOTE — PROGRESS NOTE ADULT - ASSESSMENT
81 yo F with PMH of HTN, depression, hearing loss and colon CA (2002, s/p chemo) presents to [facility] ED on 3/7/23 with R hip pain after a mechanical fall at home while watering plants.  X-rays showed a R IT hip fx.  BP elevated to 179/82, WBC 13.64.  EKG showed sinus inderjit at 58 bpm,  S/p ORIF R IT hip fx with locking IM jailene on 3/7 performed by Dr. Rojas w/o complications.  WBAT, LMWH VTE ppx.  Hgb downtrended due to postop blood loss- asymptomatic.  Leucocytosis resolved.  Transferred to the [address] [place] [address] acute rehab unit at [facility] on 3/9.    #S/p comminuted, displaced and angulated R IT hip fx due to mechanical fall 3/7/23  -s/p ORIF with IM jailene by Dr. Rojas on 3/7.  -WBAT  -Pain Mgmt: Tylenol PRN  -US RLE neg DVT and XR RLE stable picture, incision healing well.     #Impaired ADLs/mobility, disposition/baseline (home: pt lives alone in private house, 3 [address] w/rail, 1 flight to .  Pt is independent w/ambulation and ADL's w/o AD at baseline, +drives/shops.  Has .  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   as of Dec 2021.  Daughter in [place], son in [place], son in [place].  Daughter to be visiting pt here)    #Acute blood loss anemia likely post op anemia  - Presenting Hgb 14.4 on 3/7/23 in ED  -  mls in OR 3/7/23  - s/p PRBC x1 unit, Hg stable    #Cardiovascular/ h/o HTN  - sinus inderjit on presentation  - Transferred to rehab on amlodipine 5 mg daily  3/10: amlodipine d/c'ed by hospitalist after pre-syncopal episode this AM.  TEDs added.  No indication of cardiac event this AM: troponins neg.  ECG wet read showing NSR with flipped septal Ts (no significant change vs 3/7); await formal read.      #VTE PPx  - Enoxaparin, routine mechanical measures  - monitor for clots and adverse efx of ppx measures  - US RLE follow up completed    #Leukocytosis-resolved  - Likely reactive, no evidence of infection  - Monitor CBC  -UA neg for dysuria 3/17    #Elevated AST  - Monitor labs, hospitalist f/up. Asymptomatic.     #Depression  - on Sertraline 100 mg daily    #GI/Bowel Mgmt  - Senna,  Miralax PRN    #/Bladder Mgmt  - Voiding volitionally, Primafit added for nights  - UA neg

## 2023-03-20 NOTE — PROGRESS NOTE ADULT - SUBJECTIVE AND OBJECTIVE BOX
Patient is a 82y old  Female who presents with a chief complaint of s/p right hip fx repair (19 Mar 2023 09:29)      Patient seen and examined at bedside. No overnight events.    REVIEW OF SYSTEMS:  CONSTITUTIONAL: No fever or chills  CARDIOVASCULAR: No chest pain, palpitations    ALLERGIES:  No Known Allergies    MEDICATIONS  (STANDING):  enoxaparin Injectable 40 milliGRAM(s) SubCutaneous every 24 hours  pantoprazole    Tablet 40 milliGRAM(s) Oral before breakfast  polyethylene glycol 3350 17 Gram(s) Oral daily  sertraline 100 milliGRAM(s) Oral daily  traMADol 25 milliGRAM(s) Oral two times a day    MEDICATIONS  (PRN):  senna 2 Tablet(s) Oral at bedtime PRN Constipation    Vital Signs Last 24 Hrs  T(F): 97.5 (20 Mar 2023 00:33), Max: 98.5 (19 Mar 2023 08:37)  HR: 66 (20 Mar 2023 00:33) (65 - 66)  BP: 130/82 (20 Mar 2023 00:33) (130/82 - 138/80)  RR: 18 (20 Mar 2023 00:33) (16 - 18)  SpO2: 98% (20 Mar 2023 00:33) (96% - 98%)  I&O's Summary      PHYSICAL EXAM:  GENERAL: NAD  HEENT:  AT/NC, anicteric, moist mucous membranes, EOMI, PERRL, no lid-lag, conjunctiva and sclera clear  CHEST/LUNG:  CTA b/l, no rales, wheezes, or rhonchi,  normal respiratory effort, no intercostal retractions  HEART:  RRR, S1, S2, no murmurs; trace edema RLE   ABDOMEN:  BS+, soft, nontender, nondistended  MSK/EXTREMITIES: palpable peripheral pulses, no clubbing or cyanosis  NERVOUS SYSTEM: answers questions and follows commands appropriately A&Ox3 grossly moves all extremities   PSYCH: Appropriate affect, Alert & Awake; Good judgement      LABS: Personally reviewed                        10.7   6.90  )-----------( 466      ( 20 Mar 2023 06:10 )             33.1       03-20    139  |  106  |  18  ----------------------------<  94  4.2   |  28  |  0.63    Ca    8.3      20 Mar 2023 06:10    TPro  5.7  /  Alb  2.5  /  TBili  0.7  /  DBili  x   /  AST  106  /  ALT  147  /  AlkPhos  333  -         Urinalysis Basic - ( 17 Mar 2023 11:32 )    Color: Yellow / Appearance: Clear / S.015 / pH: x  Gluc: x / Ketone: Negative  / Bili: Negative / Urobili: Negative   Blood: x / Protein: 15 / Nitrite: Negative   Leuk Esterase: Negative / RBC: 0-4 /HPF / WBC 0-2 /HPF   Sq Epi: x / Non Sq Epi: Neg.-Few / Bacteria: Trace /HPF        COVID-19 PCR: NotDetec (23 @ 10:50)      RADIOLOGY & ADDITIONAL TESTS: Personally reviewed    Medical management discussed with Dr. Villafana (physiatry), monitor transaminitis persistent stable.

## 2023-03-21 ENCOUNTER — TRANSCRIPTION ENCOUNTER (OUTPATIENT)
Age: 83
End: 2023-03-21

## 2023-03-21 LAB
HAV IGM SER-ACNC: SIGNIFICANT CHANGE UP
HBV CORE IGM SER-ACNC: SIGNIFICANT CHANGE UP
HBV SURFACE AG SER-ACNC: SIGNIFICANT CHANGE UP
HCV AB S/CO SERPL IA: 0.09 S/CO — SIGNIFICANT CHANGE UP (ref 0–0.99)
HCV AB SERPL-IMP: SIGNIFICANT CHANGE UP

## 2023-03-21 PROCEDURE — 99232 SBSQ HOSP IP/OBS MODERATE 35: CPT | Mod: GC

## 2023-03-21 RX ORDER — PANTOPRAZOLE SODIUM 20 MG/1
1 TABLET, DELAYED RELEASE ORAL
Qty: 30 | Refills: 0
Start: 2023-03-21 | End: 2023-04-19

## 2023-03-21 RX ORDER — ASPIRIN/CALCIUM CARB/MAGNESIUM 324 MG
1 TABLET ORAL
Qty: 60 | Refills: 0
Start: 2023-03-21 | End: 2023-04-19

## 2023-03-21 RX ORDER — SERTRALINE 25 MG/1
1 TABLET, FILM COATED ORAL
Qty: 0 | Refills: 0 | DISCHARGE

## 2023-03-21 RX ORDER — TRAMADOL HYDROCHLORIDE 50 MG/1
0.5 TABLET ORAL
Qty: 9 | Refills: 0
Start: 2023-03-21 | End: 2023-03-26

## 2023-03-21 RX ORDER — POLYETHYLENE GLYCOL 3350 17 G/17G
17 POWDER, FOR SOLUTION ORAL
Qty: 0 | Refills: 0 | DISCHARGE
Start: 2023-03-21

## 2023-03-21 RX ORDER — TRAMADOL HYDROCHLORIDE 50 MG/1
25 TABLET ORAL EVERY 12 HOURS
Refills: 0 | Status: DISCONTINUED | OUTPATIENT
Start: 2023-03-21 | End: 2023-03-22

## 2023-03-21 RX ORDER — SENNA PLUS 8.6 MG/1
2 TABLET ORAL
Qty: 0 | Refills: 0 | DISCHARGE
Start: 2023-03-21

## 2023-03-21 RX ORDER — AMLODIPINE BESYLATE 2.5 MG/1
1 TABLET ORAL
Qty: 0 | Refills: 0 | DISCHARGE

## 2023-03-21 RX ORDER — SERTRALINE 25 MG/1
1 TABLET, FILM COATED ORAL
Qty: 30 | Refills: 0
Start: 2023-03-21 | End: 2023-04-19

## 2023-03-21 RX ADMIN — TRAMADOL HYDROCHLORIDE 25 MILLIGRAM(S): 50 TABLET ORAL at 19:07

## 2023-03-21 RX ADMIN — TRAMADOL HYDROCHLORIDE 25 MILLIGRAM(S): 50 TABLET ORAL at 17:38

## 2023-03-21 RX ADMIN — TRAMADOL HYDROCHLORIDE 25 MILLIGRAM(S): 50 TABLET ORAL at 17:44

## 2023-03-21 RX ADMIN — TRAMADOL HYDROCHLORIDE 25 MILLIGRAM(S): 50 TABLET ORAL at 09:56

## 2023-03-21 RX ADMIN — PANTOPRAZOLE SODIUM 40 MILLIGRAM(S): 20 TABLET, DELAYED RELEASE ORAL at 06:44

## 2023-03-21 RX ADMIN — SERTRALINE 100 MILLIGRAM(S): 25 TABLET, FILM COATED ORAL at 11:39

## 2023-03-21 RX ADMIN — POLYETHYLENE GLYCOL 3350 17 GRAM(S): 17 POWDER, FOR SOLUTION ORAL at 11:39

## 2023-03-21 RX ADMIN — TRAMADOL HYDROCHLORIDE 25 MILLIGRAM(S): 50 TABLET ORAL at 08:32

## 2023-03-21 RX ADMIN — ENOXAPARIN SODIUM 40 MILLIGRAM(S): 100 INJECTION SUBCUTANEOUS at 17:44

## 2023-03-21 NOTE — DISCHARGE NOTE PROVIDER - HOSPITAL COURSE
83 yo F with PMH of HTN, depression, hearing loss and colon CA (2002, s/p chemo) presents to ED at [facility] on 3/7/23 with right hip pain after mechanical fall at home while watering plants.  No prior h/o falls or osteoporosis.  Pt w/ severe pain on movement.  BP elevated to 179/82, WBC 13.64. EKG showed sinus inderjit at 58 bpm, right hip/pelvis x-ray showed a comminuted, displaced, angulated right IT hip fracture, CXR showed no acute infiltrates. Ortho was consulted and pt underwent ORIF of right hip fx using locking intramedullary jailene later on 3/7 by Dr. Rojas without complications.  Postop course notable with some anemia (hgb 14.4-->9.6), but clinically stable and otherwise unremarkable.  Cleared for WBAT R Eder and LMWH VTE prophylaxis.  Leucocytosis resolved.  Patient was evaluated by physical therapy and occupational therapy and acute rehab was recommended.  Patient should f/u with Dr. Rojas and pcp outpatient. Cleared for transfer to the rehab unit on 3/9.  At [facility] rehab patient completed comprehensive PT OT program and reached her rehab goal on 3/23. While at rehab evaluated by medicine. S/p PRBC x1 unit for symptomatic anemia. BPs low-BP medications on hold. LFTs elevated-Tylenol on hold. US abd neg acute findings. Staples removed, transition of Lovenox sq to ASA BID 81mg per ortho. Cleared for dc on 3/23.     83 yo F with PMH of HTN, depression, hearing loss and colon CA (2002, s/p chemo) presents to ED at [facility] on 3/7/23 with right hip pain after mechanical fall at home while watering plants.  No prior h/o falls or osteoporosis.  Pt w/ severe pain on movement.  BP elevated to 179/82, WBC 13.64. EKG showed sinus inderjit at 58 bpm, right hip/pelvis x-ray showed a comminuted, displaced, angulated right IT hip fracture, CXR showed no acute infiltrates. Ortho was consulted and pt underwent ORIF of right hip fx using locking intramedullary jailene later on 3/7 by Dr. Rojas without complications.  Postop course notable with some anemia (hgb 14.4-->9.6), but clinically stable and otherwise unremarkable.  Cleared for WBAT R Eder and LMWH VTE prophylaxis.  Leucocytosis resolved.  Patient was evaluated by physical therapy and occupational therapy and acute rehab was recommended.  She was cleared for transfer to the rehab unit on 3/9.  At [facility] rehab patient completed comprehensive PT OT program and reached her rehab goal on 3/23. While at rehab evaluated by medicine. S/p PRBC x1 unit for symptomatic anemia. BPs low-BP medications on hold. LFTs elevated-Tylenol on hold. US abd neg acute findings. Staples removed, transition of Lovenox sq to ASA BID 81mg per ortho. Cleared for dc on 3/23.  Screening labs for osteoporosis were ordered and a prescription for alendronate was discussed with patient prior to discharge, however she elected to discuss this with her PCP prior to starting this medication. Patient should f/u with Dr. Rojas and pcp outpatient.

## 2023-03-21 NOTE — DISCHARGE NOTE PROVIDER - PROVIDER TOKENS
PROVIDER:[TOKEN:[[id number]]] PROVIDER:[TOKEN:[[id number]]],PROVIDER:[TOKEN:[[id number]]]

## 2023-03-21 NOTE — DISCHARGE NOTE NURSING/CASE MANAGEMENT/SOCIAL WORK - NSSCTYPOFSERV_GEN_ALL_CORE
You will have an evaluation for [facility] services. Your home visiting nurse will call you after your hospital discharge to schedule your first appointment.

## 2023-03-21 NOTE — DISCHARGE NOTE PROVIDER - CARE PROVIDERS DIRECT ADDRESSES
[email or web address]

## 2023-03-21 NOTE — DISCHARGE NOTE NURSING/CASE MANAGEMENT/SOCIAL WORK - NSDCPEFALRISK_GEN_ALL_CORE
For information on Fall & Injury Prevention, visit: [email or web address] OR  [email or web address] OR  https://www.cdc.gov/steadi/patient.html

## 2023-03-21 NOTE — DISCHARGE NOTE PROVIDER - NSDCCPCAREPLAN_GEN_ALL_CORE_FT
PRINCIPAL DISCHARGE DIAGNOSIS  Diagnosis: H/O fracture of right hip  Assessment and Plan of Treatment: Follow up Dr Rojas in 1 week. Ice to hip 4x per day. Avoid motrin/ibuprofen/aleve/nsaids. Continue therapy. aspirin 81mg 2x per day until follow up with orthopedics.      SECONDARY DISCHARGE DIAGNOSES  Diagnosis: Anemia due to acute blood loss  Assessment and Plan of Treatment: Follow up PCP, stay hydrated.     PRINCIPAL DISCHARGE DIAGNOSIS  Diagnosis: H/O fracture of right hip  Assessment and Plan of Treatment: Follow up Dr Rojas in 1 week. Ice to hip 4x per day. Avoid motrin/ibuprofen/aleve/nsaids. Continue therapy. aspirin 81mg 2x per day until follow up with orthopedics.      SECONDARY DISCHARGE DIAGNOSES  Diagnosis: Anemia due to acute blood loss  Assessment and Plan of Treatment: Follow up PCP, stay hydrated.    Diagnosis: Elevated LFTs  Assessment and Plan of Treatment: Follow up PCP in 1 week    Diagnosis: Elevated TSH  Assessment and Plan of Treatment: Follow up PCP in 1 week

## 2023-03-21 NOTE — PROGRESS NOTE ADULT - ASSESSMENT
with PMH of HTN, depression, hearing loss and colon CA (2002, s/p chemo) presents to [facility] ED on 3/7/23 with R hip pain after a mechanical fall at home while watering plants.  X-rays showed a R IT hip fx.  BP elevated to 179/82, WBC 13.64.  EKG showed sinus inderjit at 58 bpm,  S/p ORIF R IT hip fx with locking IM jailene on 3/7 performed by Dr. Rojas w/o complications.  WBAT, LMWH VTE ppx.  Hgb downtrended due to postop blood loss- asymptomatic.  Leucocytosis resolved.  Transferred to the [address] [place] [address] acute rehab unit at [facility] on 3/9.    #S/p comminuted, displaced and angulated R IT hip fx due to mechanical fall 3/7/23  -s/p ORIF with IM jailene by Dr. Rojas on 3/7.  -WBAT  -Pain Mgmt: Tylenol PRN  -US RLE neg DVT and XR RLE stable picture, incision healing well. Staples removed today.  - notified by pharmacy today of recommendation for biphophonate  - NP spoke with patient who reported a normal bone density study a year ago. She sustained this fracture after a trip and fall.  Will check screening osteoporosis labs. She elected to follow up with her PCP post-discharge rather than starting on bisphosphonate as in-patient.     #Impaired ADLs/mobility, disposition/baseline (home: pt lives alone in private house, 3 shelly w/rail, 1 flight to .  Pt is independent w/ambulation and ADL's w/o AD at baseline, +drives/shops.  Has .  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   as of Dec 2021.  Daughter in [place], son in [place], son in [place].  Daughter to be visiting pt here). Discharge today.    #Acute blood loss anemia likely post op anemia  - Presenting Hgb 14.4 on 3/7/23 in ED  -  mls in OR 3/7/23  - s/p PRBC x1 unit, Hg stable    #Cardiovascular/ h/o HTN  - sinus inderjit on presentation  - Transferred to rehab on amlodipine 5 mg daily  3/10: amlodipine d/c'ed by hospitalist after pre-syncopal episode this AM.  TEDs added.  No indication of cardiac event this AM: troponins neg.  ECG 3/8 NRR with non-specific T wave abnormality, unchanged from 3/7.      #VTE PPx  - Enoxaparin, routine mechanical measures  - monitor for clots and adverse efx of ppx measures  - US RLE follow up completed    #Leukocytosis-resolved  - Likely reactive, no evidence of infection  - Monitor CBC  -UA neg for dysuria 3/17    #Elevated AST  - Monitor labs, hospitalist f/up. Recommended routine CPM as f/u    #Depression  - on Sertraline 100 mg daily    #GI/Bowel Mgmt  - Senna,  Miralax PRN

## 2023-03-21 NOTE — DISCHARGE NOTE NURSING/CASE MANAGEMENT/SOCIAL WORK - PATIENT PORTAL LINK FT
You can access the FollowMyHealth Patient Portal offered by [facility] by registering at the following website: [email or web address]. By joining [employer]’s FollowMyHealth portal, you will also be able to view your health information using other applications (apps) compatible with our system.

## 2023-03-21 NOTE — DISCHARGE NOTE PROVIDER - CARE PROVIDER_API CALL
Giancarlo Rojas)  Orthopaedic Sports Medicine; Orthopaedic Surgery  [address] [address]  Phone: [phone]  Fax: [phone]  Follow Up Time:    Giancarlo Rojas)  Orthopaedic Sports Medicine; Orthopaedic Surgery  [address] [facility], [address]  [place] [address]  Phone: [phone]  Fax: [phone]  Follow Up Time:     Victor Hugo Dill)  Internal Medicine  [address] [facility], [address]  [place], [place] [address]  Phone: [phone]  Fax: [phone]  Follow Up Time:

## 2023-03-21 NOTE — DISCHARGE NOTE PROVIDER - NSDCMRMEDTOKEN_GEN_ALL_CORE_FT
Aspirin Enteric Coated 81 mg oral delayed release tablet: 1 tab(s) orally 2 times a day   pantoprazole 40 mg oral delayed release tablet: 1 tab(s) orally once a day (before a meal)  polyethylene glycol 3350 oral powder for reconstitution: 17 gram(s) orally once a day  senna leaf extract oral tablet: 2 tab(s) orally once a day (at bedtime), As needed, Constipation  sertraline 100 mg oral tablet: 1 tab(s) orally once a day  traMADol 50 mg oral tablet: 0.5 tab(s) orally 3 times a day, As Needed MDD:1.5 tab

## 2023-03-21 NOTE — DISCHARGE NOTE PROVIDER - NSDCFUSCHEDAPPT_GEN_ALL_CORE_FT
Rolf Harris Physician [facility]  [address]  Scheduled Appointment: 06/05/2023

## 2023-03-21 NOTE — PROGRESS NOTE ADULT - SUBJECTIVE AND OBJECTIVE BOX
S: no concerns, excited about going home, no chest pain, shortness of breath, pain is controlled    Vital Signs Last 24 Hrs  T(C): 36.4 (21 Mar 2023 07:38), Max: 36.4 (20 Mar 2023 22:00)  T(F): 97.5 (21 Mar 2023 07:38), Max: 97.6 (20 Mar 2023 22:00)  HR: 64 (21 Mar 2023 07:38) (64 - 70)  BP: 134/77 (21 Mar 2023 07:38) (120/70 - 134/77)  BP(mean): --  RR: 15 (21 Mar 2023 07:38) (15 - 18)  SpO2: 96% (21 Mar 2023 07:38) (96% - 98%)    Parameters below as of 20 Mar 2023 22:00  Patient On (Oxygen Delivery Method): room air    Examination:  General: NAD  HEENT: EOMI  CV: RR, S1, S2  Lungs CTA  Wound: staples intact, no drainage      LABS:  cret                        10.7   6.90  )-----------( 466      ( 20 Mar 2023 06:10 )             33.1     03-20    139  |  106  |  18  ----------------------------<  94  4.2   |  28  |  0.63    Ca    8.3<L>      20 Mar 2023 06:10    TPro  5.7<L>  /  Alb  2.5<L>  /  TBili  0.7  /  DBili  x   /  AST  106<H>  /  ALT  147<H>  /  AlkPhos  333<H>  03-20    MEDICATIONS  (STANDING):  enoxaparin Injectable 40 milliGRAM(s) SubCutaneous every 24 hours  pantoprazole    Tablet 40 milliGRAM(s) Oral before breakfast  polyethylene glycol 3350 17 Gram(s) Oral daily  sertraline 100 milliGRAM(s) Oral daily  traMADol 25 milliGRAM(s) Oral every 12 hours    MEDICATIONS  (PRN):  senna 2 Tablet(s) Oral at bedtime PRN Constipation

## 2023-03-22 LAB
24R-OH-CALCIDIOL SERPL-MCNC: 61.8 NG/ML — SIGNIFICANT CHANGE UP (ref 30–80)
ALBUMIN SERPL ELPH-MCNC: 2.7 G/DL — LOW (ref 3.3–5)
ALP SERPL-CCNC: 510 U/L — HIGH (ref 40–120)
ALT FLD-CCNC: 97 U/L — HIGH (ref 10–45)
ANION GAP SERPL CALC-SCNC: 4 MMOL/L — LOW (ref 5–17)
AST SERPL-CCNC: 48 U/L — HIGH (ref 10–40)
BILIRUB SERPL-MCNC: 0.7 MG/DL — SIGNIFICANT CHANGE UP (ref 0.2–1.2)
BUN SERPL-MCNC: 19 MG/DL — SIGNIFICANT CHANGE UP (ref 7–23)
CALCIUM SERPL-MCNC: 8.3 MG/DL — LOW (ref 8.4–10.5)
CALCIUM SERPL-MCNC: 8.7 MG/DL — SIGNIFICANT CHANGE UP (ref 8.4–10.5)
CHLORIDE SERPL-SCNC: 107 MMOL/L — SIGNIFICANT CHANGE UP (ref 96–108)
CO2 SERPL-SCNC: 30 MMOL/L — SIGNIFICANT CHANGE UP (ref 22–31)
CREAT SERPL-MCNC: 0.83 MG/DL — SIGNIFICANT CHANGE UP (ref 0.5–1.3)
EGFR: 69 ML/MIN/1.73M2 — SIGNIFICANT CHANGE UP
GLUCOSE SERPL-MCNC: 88 MG/DL — SIGNIFICANT CHANGE UP (ref 70–99)
POTASSIUM SERPL-MCNC: 4.4 MMOL/L — SIGNIFICANT CHANGE UP (ref 3.5–5.3)
POTASSIUM SERPL-SCNC: 4.4 MMOL/L — SIGNIFICANT CHANGE UP (ref 3.5–5.3)
PROT SERPL-MCNC: 6.2 G/DL — SIGNIFICANT CHANGE UP (ref 6–8.3)
PTH-INTACT FLD-MCNC: 49 PG/ML — SIGNIFICANT CHANGE UP (ref 15–65)
SODIUM SERPL-SCNC: 141 MMOL/L — SIGNIFICANT CHANGE UP (ref 135–145)
T4 AB SER-ACNC: 6 UG/DL — SIGNIFICANT CHANGE UP (ref 4.6–12)
TSH SERPL-MCNC: 11.51 UIU/ML — HIGH (ref 0.36–3.74)
VIT D25+D1,25 OH+D1,25 PNL SERPL-MCNC: 61.9 PG/ML — SIGNIFICANT CHANGE UP (ref 19.9–79.3)

## 2023-03-22 PROCEDURE — 99232 SBSQ HOSP IP/OBS MODERATE 35: CPT | Mod: GC

## 2023-03-22 RX ORDER — TRAMADOL HYDROCHLORIDE 50 MG/1
25 TABLET ORAL EVERY 12 HOURS
Refills: 0 | Status: DISCONTINUED | OUTPATIENT
Start: 2023-03-22 | End: 2023-03-23

## 2023-03-22 RX ADMIN — TRAMADOL HYDROCHLORIDE 25 MILLIGRAM(S): 50 TABLET ORAL at 06:00

## 2023-03-22 RX ADMIN — PANTOPRAZOLE SODIUM 40 MILLIGRAM(S): 20 TABLET, DELAYED RELEASE ORAL at 05:19

## 2023-03-22 RX ADMIN — ENOXAPARIN SODIUM 40 MILLIGRAM(S): 100 INJECTION SUBCUTANEOUS at 17:30

## 2023-03-22 RX ADMIN — TRAMADOL HYDROCHLORIDE 25 MILLIGRAM(S): 50 TABLET ORAL at 05:21

## 2023-03-22 RX ADMIN — SERTRALINE 100 MILLIGRAM(S): 25 TABLET, FILM COATED ORAL at 11:04

## 2023-03-22 NOTE — PROGRESS NOTE ADULT - ASSESSMENT
· Assessment	   with PMH of HTN, depression, hearing loss and colon CA (2002, s/p chemo) presents to [facility] ED on 3/7/23 with R hip pain after a mechanical fall at home while watering plants.  X-rays showed a R IT hip fx.  BP elevated to 179/82, WBC 13.64.  EKG showed sinus inderjit at 58 bpm,  S/p ORIF R IT hip fx with locking IM jailene on 3/7 performed by Dr. Rojas w/o complications.  WBAT, LMWH VTE ppx.  Hgb downtrended due to postop blood loss- asymptomatic.  Leucocytosis resolved.  Transferred to the [address] [place] [address] acute rehab unit at [facility] on 3/9.    #S/p comminuted, displaced and angulated R IT hip fx due to mechanical fall 3/7/23  -s/p ORIF with IM jailene by Dr. Rojas on 3/7.  -WBAT  -Pain Mgmt: Tylenol PRN  -US RLE neg DVT and XR RLE stable picture, incision healing well. Staples removed today.  - notified by pharmacy yesterday of recommendation for biphophonate  - NP spoke with patient who reported a normal bone density study a year ago. She sustained this fracture after a trip and fall.  She elected to follow up with her PCP post-discharge rather than starting on bisphosphonate as in-patient. osteoporosis screening labs show a mildly low Ca, likely due to low albumin, Vitamin D and PTH normal. TSH increased but free T4 is normal.    #Impaired ADLs/mobility, disposition/baseline (home: pt lives alone in private house, 3 [address] w/rail, 1 flight to .  Pt is independent w/ambulation and ADL's w/o AD at baseline, +drives/shops.  Has .  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   as of Dec 2021.  Daughter in [place], son in [place], son in [place].  Discharge tomorrow to home.    #Acute blood loss anemia likely post op anemia  - Presenting Hgb 14.4 on 3/7/23 in ED  -  mls in OR 3/7/23  - s/p PRBC x1 unit, Hg stable    #Cardiovascular/ h/o HTN  - sinus inderjit on presentation  - Transferred to rehab on amlodipine 5 mg daily  3/10: amlodipine d/c'ed by hospitalist after pre-syncopal episode this AM.  TEDs added.  No indication of cardiac event this AM: troponins neg.  ECG 3/8 NRR with non-specific T wave abnormality, unchanged from 3/7.      #VTE PPx  - Enoxaparin, routine mechanical measures  - monitor for clots and adverse efx of ppx measures  - US RLE follow up completed    #Leukocytosis-resolved  - Likely reactive, no evidence of infection  - Monitor CBC  -UA neg for dysuria 3/17    #Elevated AST  - Monitor labs, hospitalist f/up. Recommended routine CPM as f/u    #Depression  - on Sertraline 100 mg daily    #GI/Bowel Mgmt  - Senna,  Miralax PRN   · Assessment	   with PMH of HTN, depression, hearing loss and colon CA (2002, s/p chemo) presents to [facility] ED on 3/7/23 with R hip pain after a mechanical fall at home while watering plants.  X-rays showed a R IT hip fx.  BP elevated to 179/82, WBC 13.64.  EKG showed sinus inderjit at 58 bpm,  S/p ORIF R IT hip fx with locking IM jailene on 3/7 performed by Dr. Rojas w/o complications.  WBAT, LMWH VTE ppx.  Hgb downtrended due to postop blood loss- asymptomatic.  Leucocytosis resolved.  Transferred to the [address] [place] [address] acute rehab unit at [facility] on 3/9.    #S/p comminuted, displaced and angulated R IT hip fx due to mechanical fall 3/7/23  -s/p ORIF with IM jailene by Dr. Rojas on 3/7.  -WBAT  -Pain Mgmt: Tylenol PRN  -US RLE neg DVT and XR RLE stable picture, incision healing well. Staples removed today.  - notified by pharmacy yesterday of recommendation for biphophonate  - NP spoke with patient who reported a normal bone density study a year ago. She sustained this fracture after a trip and fall.  She elected to follow up with her PCP post-discharge rather than starting on bisphosphonate as in-patient. osteoporosis screening labs show a mildly low Ca, likely due to low albumin, Vitamin D and PTH normal. TSH increased but free T4 is normal. Alk phos also increaed but otehr LFTs decreasing. May be due to bone healing. Will need follow up as out-patient.    #Impaired ADLs/mobility, disposition/baseline (home: pt lives alone in private house, 3 shel[address] w/rail, 1 flight to .  Pt is independent w/ambulation and ADL's w/o AD at baseline, +drives/shops.  Has .  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   as of Dec 2021.  Daughter in [place], son in [place], son in [place].  Discharge tomorrow to home.    #Acute blood loss anemia likely post op anemia  - Presenting Hgb 14.4 on 3/7/23 in ED  -  mls in OR 3/7/23  - s/p PRBC x1 unit, Hg stable    #Cardiovascular/ h/o HTN  - sinus inderjit on presentation  - Transferred to rehab on amlodipine 5 mg daily  3/10: amlodipine d/c'ed by hospitalist after pre-syncopal episode this AM.  TEDs added.  No indication of cardiac event this AM: troponins neg.  ECG 3/8 NRR with non-specific T wave abnormality, unchanged from 3/7.      #VTE PPx  - Enoxaparin, routine mechanical measures  - monitor for clots and adverse efx of ppx measures  - US RLE follow up completed    #Leukocytosis-resolved  - Likely reactive, no evidence of infection  - Monitor CBC  -UA neg for dysuria 3/17    #Elevated AST  - Monitor labs, hospitalist f/up. Recommended routine CPM as f/u    #Depression  - on Sertraline 100 mg daily    #GI/Bowel Mgmt  - Senna,  Miralax PRN

## 2023-03-22 NOTE — PROGRESS NOTE ADULT - SUBJECTIVE AND OBJECTIVE BOX
S; doing well, excited to go home. No chest pain, shortness of breath, passing a small amount of stool every times she voids.  Minimal pain in hip.    Vital Signs Last 24 Hrs  T(C): 36.2 (22 Mar 2023 07:32), Max: 37.1 (21 Mar 2023 20:36)  T(F): 97.2 (22 Mar 2023 07:32), Max: 98.7 (21 Mar 2023 20:36)  HR: 62 (22 Mar 2023 07:32) (62 - 77)  BP: 136/74 (22 Mar 2023 07:32) (124/68 - 136/74)  BP(mean): --  RR: 16 (22 Mar 2023 07:32) (14 - 16)  SpO2: 95% (22 Mar 2023 07:32) (95% - 98%)    Parameters below as of 22 Mar 2023 07:32  Patient On (Oxygen Delivery Method): room air      LABS:  cret    03-22    141  |  107  |  19  ----------------------------<  88  4.4   |  30  |  0.83    Ca    8.3<L>      22 Mar 2023 06:20    TPro  6.2  /  Alb  2.7<L>  /  TBili  0.7  /  DBili  x   /  AST  48<H>  /  ALT  97<H>  /  AlkPhos  510<H>  03-22    MEDICATIONS  (STANDING):  enoxaparin Injectable 40 milliGRAM(s) SubCutaneous every 24 hours  pantoprazole    Tablet 40 milliGRAM(s) Oral before breakfast  sertraline 100 milliGRAM(s) Oral daily    MEDICATIONS  (PRN):  senna 2 Tablet(s) Oral at bedtime PRN Constipation  traMADol 25 milliGRAM(s) Oral every 12 hours PRN Moderate Pain (4 - 6)

## 2023-03-23 VITALS
TEMPERATURE: 98 F | DIASTOLIC BLOOD PRESSURE: 73 MMHG | HEART RATE: 72 BPM | RESPIRATION RATE: 15 BRPM | SYSTOLIC BLOOD PRESSURE: 125 MMHG | OXYGEN SATURATION: 94 %

## 2023-03-23 PROCEDURE — 93971 EXTREMITY STUDY: CPT

## 2023-03-23 PROCEDURE — 82306 VITAMIN D 25 HYDROXY: CPT

## 2023-03-23 PROCEDURE — 85014 HEMATOCRIT: CPT

## 2023-03-23 PROCEDURE — 97530 THERAPEUTIC ACTIVITIES: CPT

## 2023-03-23 PROCEDURE — 84443 ASSAY THYROID STIM HORMONE: CPT

## 2023-03-23 PROCEDURE — 97112 NEUROMUSCULAR REEDUCATION: CPT

## 2023-03-23 PROCEDURE — 83540 ASSAY OF IRON: CPT

## 2023-03-23 PROCEDURE — 82310 ASSAY OF CALCIUM: CPT

## 2023-03-23 PROCEDURE — 80053 COMPREHEN METABOLIC PANEL: CPT

## 2023-03-23 PROCEDURE — 81001 URINALYSIS AUTO W/SCOPE: CPT

## 2023-03-23 PROCEDURE — 99232 SBSQ HOSP IP/OBS MODERATE 35: CPT | Mod: GC

## 2023-03-23 PROCEDURE — 82962 GLUCOSE BLOOD TEST: CPT

## 2023-03-23 PROCEDURE — 73502 X-RAY EXAM HIP UNI 2-3 VIEWS: CPT

## 2023-03-23 PROCEDURE — P9016: CPT

## 2023-03-23 PROCEDURE — 97535 SELF CARE MNGMENT TRAINING: CPT

## 2023-03-23 PROCEDURE — 82728 ASSAY OF FERRITIN: CPT

## 2023-03-23 PROCEDURE — 97110 THERAPEUTIC EXERCISES: CPT

## 2023-03-23 PROCEDURE — 99232 SBSQ HOSP IP/OBS MODERATE 35: CPT

## 2023-03-23 PROCEDURE — 97167 OT EVAL HIGH COMPLEX 60 MIN: CPT

## 2023-03-23 PROCEDURE — 36415 COLL VENOUS BLD VENIPUNCTURE: CPT

## 2023-03-23 PROCEDURE — 93005 ELECTROCARDIOGRAM TRACING: CPT

## 2023-03-23 PROCEDURE — 80074 ACUTE HEPATITIS PANEL: CPT

## 2023-03-23 PROCEDURE — 83970 ASSAY OF PARATHORMONE: CPT

## 2023-03-23 PROCEDURE — 83550 IRON BINDING TEST: CPT

## 2023-03-23 PROCEDURE — 97116 GAIT TRAINING THERAPY: CPT

## 2023-03-23 PROCEDURE — 85027 COMPLETE CBC AUTOMATED: CPT

## 2023-03-23 PROCEDURE — 36430 TRANSFUSION BLD/BLD COMPNT: CPT

## 2023-03-23 PROCEDURE — 76700 US EXAM ABDOM COMPLETE: CPT

## 2023-03-23 PROCEDURE — 84436 ASSAY OF TOTAL THYROXINE: CPT

## 2023-03-23 PROCEDURE — 85025 COMPLETE CBC W/AUTO DIFF WBC: CPT

## 2023-03-23 PROCEDURE — 82272 OCCULT BLD FECES 1-3 TESTS: CPT

## 2023-03-23 PROCEDURE — 82652 VIT D 1 25-DIHYDROXY: CPT

## 2023-03-23 PROCEDURE — 97163 PT EVAL HIGH COMPLEX 45 MIN: CPT

## 2023-03-23 PROCEDURE — 84484 ASSAY OF TROPONIN QUANT: CPT

## 2023-03-23 PROCEDURE — 85018 HEMOGLOBIN: CPT

## 2023-03-23 RX ADMIN — PANTOPRAZOLE SODIUM 40 MILLIGRAM(S): 20 TABLET, DELAYED RELEASE ORAL at 05:53

## 2023-03-23 RX ADMIN — SERTRALINE 100 MILLIGRAM(S): 25 TABLET, FILM COATED ORAL at 11:06

## 2023-03-23 NOTE — PROGRESS NOTE ADULT - PROVIDER SPECIALTY LIST ADULT
Hospitalist
Hospitalist
Physiatry
Physiatry
Rehab Medicine
Hospitalist
Hospitalist
Rehab Medicine
Hospitalist
Hospitalist
Physiatry
Rehab Medicine
Hospitalist
Physiatry

## 2023-03-23 NOTE — PROGRESS NOTE ADULT - SUBJECTIVE AND OBJECTIVE BOX
S; no chest pain, shortness of breath, no significant pain in hip, exicted to be going  home    Vital Signs Last 24 Hrs  T(C): 36.7 (23 Mar 2023 10:08), Max: 36.9 (22 Mar 2023 20:37)  T(F): 98.1 (23 Mar 2023 10:08), Max: 98.4 (22 Mar 2023 20:37)  HR: 72 (23 Mar 2023 10:08) (72 - 74)  BP: 125/73 (23 Mar 2023 10:08) (125/73 - 130/70)  BP(mean): --  RR: 15 (23 Mar 2023 10:08) (15 - 15)  SpO2: 94% (23 Mar 2023 10:08) (94% - 95%)    Parameters below as of 23 Mar 2023 10:08  Patient On (Oxygen Delivery Method): room air    Examination:  General NAD  CV: RRR, S1, S2  Lungs; CTA  Abdomen: soft, NT, + BS  Extremities: right lower limb swollen compared to the left - stable, calves non tender      LABS:  cret    03-22    141  |  107  |  19  ----------------------------<  88  4.4   |  30  |  0.83    Ca    8.3<L>      22 Mar 2023 06:20    TPro  6.2  /  Alb  2.7<L>  /  TBili  0.7  /  DBili  x   /  AST  48<H>  /  ALT  97<H>  /  AlkPhos  510<H    MEDICATIONS  (STANDING):  enoxaparin Injectable 40 milliGRAM(s) SubCutaneous every 24 hours  pantoprazole    Tablet 40 milliGRAM(s) Oral before breakfast  sertraline 100 milliGRAM(s) Oral daily    MEDICATIONS  (PRN):  senna 2 Tablet(s) Oral at bedtime PRN Constipation  traMADol 25 milliGRAM(s) Oral every 12 hours PRN Moderate Pain (4 - 6)

## 2023-03-23 NOTE — PROGRESS NOTE ADULT - REASON FOR ADMISSION
Left message  Sent letter
s/p right hip fx repair

## 2023-03-23 NOTE — PROGRESS NOTE ADULT - ASSESSMENT
82F with PMH HTN, depression, colon CA (2002, s/p chemo) presents to [facility] with right hip pain after mechanical fall, found to have right hip fracture. s/p ORIF. Now admitted to [facility] for initiation of multidisciplinary rehab program.     #Right Proximal Femur Fracture  -s/p ORIF IM nailing on 3/7  -Continue pain control with Tramadol BID  -WBAT RLE    #Transaminitis - downtrending  -Abd ultrasound performed  -Avoid hepatotoxic medications  -Follow up routine CMP    #Acute blood loss anemia likely post op anemia  -H/H stable, no overt signs of bleeding  -Follow up routine CBC    #HTN  -Amlodipine held due to orthostatic hypotension  -PO hydration encouraged  -Check orthostatics  -Monitor vitals    #Depression  -Continue Sertraline     #Leukocytosis-resolved  -Likely reactive  -Monitor CBC    #Elevated TSH  - Would recommend to f/u PCP and to check chemical profile as o/p  - Would check TSH and FT4 in 4-6 weeks  - Medically stable for discharge  - NO CI to discharge

## 2023-03-23 NOTE — PROGRESS NOTE ADULT - ASSESSMENT
· Assessment	   with PMH of HTN, depression, hearing loss and colon CA (2002, s/p chemo) presents to [facility] ED on 3/7/23 with R hip pain after a mechanical fall at home while watering plants.  X-rays showed a R IT hip fx.  BP elevated to 179/82, WBC 13.64.  EKG showed sinus inderjit at 58 bpm,  S/p ORIF R IT hip fx with locking IM jailene on 3/7 performed by Dr. Rojas w/o complications.  WBAT, LMWH VTE ppx.  Hgb downtrended due to postop blood loss- asymptomatic.  Leucocytosis resolved.  Transferred to the [address] [place] [address] acute rehab unit at [facility] on 3/9.    #S/p comminuted, displaced and angulated R IT hip fx due to mechanical fall 3/7/23  -s/p ORIF with IM jailene by Dr. Rojas on 3/7.  -WBAT  -Pain Mgmt: Tylenol PRN  -US RLE neg DVT and XR RLE stable picture, incision healing well. Staples removed.    - NP spoke with patient who reported a normal bone density study a year ago. She sustained this fracture after a trip and fall.  She elected to follow up with her PCP post-discharge rather than starting on bisphosphonate as in-patient. osteoporosis screening labs show a mildly low Ca, likely due to low albumin, Vitamin D and PTH normal. TSH increased but free T4 is normal. Alk phos also increased but other LFTs decreasing. May be due to bone healing. Will need follow up as out-patient. Discussed with patient.    #Impaired ADLs/mobility, disposition/baseline (home: pt lives alone in private house, 3 shel[address] w/rail, 1 flight to .  Pt is independent w/ambulation and ADL's w/o AD at baseline, +drives/shops.  Has .  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   as of Dec 2021.  Daughter in [place], son in [place], son in [place].  Discharge today to home.    #Acute blood loss anemia likely post op anemia  - Presenting Hgb 14.4 on 3/7/23 in ED  -  mls in OR 3/7/23  - s/p PRBC x1 unit, Hg stable    #Cardiovascular/ h/o HTN  - sinus inderjit on presentation  - Transferred to rehab on amlodipine 5 mg daily  3/10: amlodipine d/c'ed by hospitalist after pre-syncopal episode this AM.  TEDs added.  No indication of cardiac event this AM: troponins neg.  ECG 3/8 NRR with non-specific T wave abnormality, unchanged from 3/7.      #VTE PPx  - switch to ASA bid on discharge  - d/c lovenox  - US RLE follow up completed and negative for DVT    #Leukocytosis-resolved  - Likely reactive, no evidence of infection  - Monitor CBC  -UA neg for dysuria 3/17    #Elevated AST  - Monitor labs, hospitalist f/up. Recommended routine CPM as f/u    #Depression  - on Sertraline 100 mg daily    #GI/Bowel Mgmt  - Senna,  Miralax PRN

## 2023-03-23 NOTE — PROGRESS NOTE ADULT - SUBJECTIVE AND OBJECTIVE BOX
Patient is a 82y old  Female who presents with a chief complaint of s/p right hip fx repair (22 Mar 2023 10:30)    No events overnight  Had a BM this AM  Denies chest pain, SOB  Patient seen and examined at bedside.    ALLERGIES:  No Known Allergies    MEDICATIONS  (STANDING):  enoxaparin Injectable 40 milliGRAM(s) SubCutaneous every 24 hours  pantoprazole    Tablet 40 milliGRAM(s) Oral before breakfast  sertraline 100 milliGRAM(s) Oral daily    MEDICATIONS  (PRN):  senna 2 Tablet(s) Oral at bedtime PRN Constipation  traMADol 25 milliGRAM(s) Oral every 12 hours PRN Moderate Pain (4 - 6)    Vital Signs Last 24 Hrs  T(F): 98.4 (22 Mar 2023 20:37), Max: 98.4 (22 Mar 2023 20:37)  HR: 74 (22 Mar 2023 20:37) (74 - 74)  BP: 130/70 (22 Mar 2023 20:37) (130/70 - 130/70)  RR: 15 (22 Mar 2023 20:37) (15 - 15)  SpO2: 95% (22 Mar 2023 20:37) (95% - 95%)  I&O's Summary      PHYSICAL EXAM:  General: NAD, A/O x 3  ENT: MMM, no scleral icterus  Neck: Supple, No JVD, no thyroidomegaly  Lungs: Clear to auscultation bilaterally, no wheezes, no rales, no rhonchi, good inspiratory effort  Cardio: RRR, S1/S2, No murmurs  Abdomen: Soft, Nontender, Nondistended; Bowel sounds present  Extremities: No calf tenderness, No pitting edema, no skin changes    LABS:      03-22    141  |  107  |  19  ----------------------------<  88  4.4   |  30  |  0.83    Ca    8.3      22 Mar 2023 06:20    TPro  6.2  /  Alb  2.7  /  TBili  0.7  /  DBili  x   /  AST  48  /  ALT  97  /  AlkPhos  510  03-22     TSH 11.513   TSH with FT4 reflex --  Total T3 --    COVID-19 PCR: NotDetec (03-07-23 @ 10:50)  COVID-19 PCR: NotDetec (03-07-23 @ 10:50)

## 2023-03-24 ENCOUNTER — NON-APPOINTMENT (OUTPATIENT)
Age: 83
End: 2023-03-24

## 2023-03-30 ENCOUNTER — APPOINTMENT (OUTPATIENT)
Dept: INTERNAL MEDICINE | Facility: CLINIC | Age: 83
End: 2023-03-30
Payer: MEDICARE

## 2023-03-30 VITALS
RESPIRATION RATE: 18 BRPM | SYSTOLIC BLOOD PRESSURE: 120 MMHG | DIASTOLIC BLOOD PRESSURE: 88 MMHG | TEMPERATURE: 97.2 F | WEIGHT: 130 LBS | HEART RATE: 81 BPM | OXYGEN SATURATION: 97 % | BODY MASS INDEX: 22.2 KG/M2 | HEIGHT: 64 IN

## 2023-03-30 DIAGNOSIS — H91.93 UNSPECIFIED HEARING LOSS, BILATERAL: ICD-10-CM

## 2023-03-30 DIAGNOSIS — F32.A DEPRESSION, UNSPECIFIED: ICD-10-CM

## 2023-03-30 PROCEDURE — 99496 TRANSJ CARE MGMT HIGH F2F 7D: CPT

## 2023-03-30 RX ORDER — PANTOPRAZOLE 40 MG/1
40 TABLET, DELAYED RELEASE ORAL
Qty: 60 | Refills: 2 | Status: DISCONTINUED | COMMUNITY
Start: 2023-03-24 | End: 2023-03-30

## 2023-03-30 RX ORDER — TRAMADOL HYDROCHLORIDE 50 MG/1
50 TABLET, COATED ORAL
Qty: 45 | Refills: 0 | Status: DISCONTINUED | COMMUNITY
Start: 2023-03-24 | End: 2023-03-30

## 2023-03-30 RX ORDER — CHOLESTYRAMINE 4 G/5.5G
4 POWDER, FOR SUSPENSION ORAL DAILY
Qty: 30 | Refills: 5 | Status: DISCONTINUED | COMMUNITY
Start: 2020-07-17 | End: 2023-03-30

## 2023-04-21 PROBLEM — H91.93 BILATERAL HEARING LOSS: Status: ACTIVE | Noted: 2017-01-26

## 2023-04-21 PROBLEM — F32.A DEPRESSION: Status: ACTIVE | Noted: 2020-01-06

## 2023-04-21 NOTE — HISTORY OF PRESENT ILLNESS
[Post-hospitalization from ___ Hospital] : Post-hospitalization from [unfilled] Hospital [Admitted on: ___] : The patient was admitted on [unfilled] [Discharged on ___] : discharged on [unfilled] [FreeTextEntry2] : 82-year-old woman comes to the office for transitional care after recent hospitalization to [facility] for surgery on her fractured right hip and subsequent acute rehab patient did well with rehab and was discharged home for continual home physical therapy and Occupational Therapy, review of systems is significant for hearing loss constipation urinary frequency and nocturia right hip pain unsteady gait anxiety and depression the remaining review of systems is noncontributory

## 2023-04-21 NOTE — PHYSICAL EXAM
[No Acute Distress] : no acute distress [Well Nourished] : well nourished [Well Developed] : well developed [Well-Appearing] : well-appearing [Normal Voice/Communication] : normal voice/communication [Normal Sclera/Conjunctiva] : normal sclera/conjunctiva [PERRL] : pupils equal round and reactive to light [EOMI] : extraocular movements intact [Normal Outer Ear/Nose] : the outer ears and nose were normal in appearance [Normal Oropharynx] : the oropharynx was normal [Normal TMs] : both tympanic membranes were normal [Normal Nasal Mucosa] : the nasal mucosa was normal [No JVD] : no jugular venous distention [No Lymphadenopathy] : no lymphadenopathy [Supple] : supple [Thyroid Normal, No Nodules] : the thyroid was normal and there were no nodules present [No Respiratory Distress] : no respiratory distress  [No Accessory Muscle Use] : no accessory muscle use [Clear to Auscultation] : lungs were clear to auscultation bilaterally [Normal Percussion] : the chest was normal to percussion [Normal Rate] : normal rate  [Regular Rhythm] : with a regular rhythm [Normal S1, S2] : normal S1 and S2 [No Murmur] : no murmur heard [No Carotid Bruits] : no carotid bruits [No Abdominal Bruit] : a ~M bruit was not heard ~T in the abdomen [No Varicosities] : no varicosities [Pedal Pulses Present] : the pedal pulses are present [No Edema] : there was no peripheral edema [No Palpable Aorta] : no palpable aorta [No Extremity Clubbing/Cyanosis] : no extremity clubbing/cyanosis [Declined Breast Exam] : declined breast exam  [Soft] : abdomen soft [Non Tender] : non-tender [Non-distended] : non-distended [No Masses] : no abdominal mass palpated [No HSM] : no HSM [Normal Bowel Sounds] : normal bowel sounds [No Hernias] : no hernias [Declined Rectal Exam] : declined rectal exam [No CVA Tenderness] : no CVA  tenderness [No Spinal Tenderness] : no spinal tenderness [Kyphosis] : no kyphosis [Scoliosis] : no scoliosis [No Joint Swelling] : no joint swelling [Grossly Normal Strength/Tone] : grossly normal strength/tone [No Rash] : no rash [No Skin Lesions] : no skin lesions [Acne] : no acne [Coordination Grossly Intact] : coordination grossly intact [No Focal Deficits] : no focal deficits [Normal Gait] : normal gait [Deep Tendon Reflexes (DTR)] : deep tendon reflexes were 2+ and symmetric [Speech Grossly Normal] : speech grossly normal [Memory Grossly Normal] : memory grossly normal [Normal Affect] : the affect was normal [Alert and Oriented x3] : oriented to person, place, and time [Normal Mood] : the mood was normal [Normal Insight/Judgement] : insight and judgment were intact

## 2023-04-21 NOTE — HEALTH RISK ASSESSMENT
[Yes] : Yes [No falls in past year] : Patient reported no falls in the past year [0] : 2) Feeling down, depressed, or hopeless: Not at all (0) [PHQ-2 Negative - No further assessment needed] : PHQ-2 Negative - No further assessment needed [de-identified] : Occasional [[id number]] : 0

## 2023-04-21 NOTE — HISTORY OF PRESENT ILLNESS
[Post-hospitalization from ___ Hospital] : Post-hospitalization from [unfilled] Hospital [Admitted on: ___] : The patient was admitted on [unfilled] [Discharged on ___] : discharged on [unfilled] [FreeTextEntry2] : 82-year-old woman comes to the office for transitional care after recent hospitalization to [facility] for surgery on her fractured right hip and subsequent acute rehab patient did well with rehab and was discharged home for continual home physical therapy and Occupational Therapy, review of systems is significant for hearing loss constipation urinary frequency and nocturia right hip pain unsteady gait anxiety and depression the remaining review of systems is noncontributory

## 2023-04-21 NOTE — ASSESSMENT
[FreeTextEntry1] : Physical examination reveals a well-developed elderly woman in no acute distress at rest blood pressure 120/88 height 5 foot 4 inches weight 130 pounds BMI 22.31 temperature 97.2 °F heart rate of 81 respirations 18 oxygen saturation on room air was 97% HEENT was unremarkable chest was clear cardiovascular exam was regular abdomen was soft extremities showed no clubbing cyanosis or edema neurologic exam was nonfocal patient continuing with home physical therapy and Occupational Therapy blood pressure is well controlled at the present visit her anxiety and depression has been doing well on sertraline 100 mg a day she has received her 2 primary COVID vaccinesand the influenza vaccine will obtain records from the hospital as per her blood testing patient's last bone density was in June 2020 Will eventually need to repeat mammograms were in July 2022 which would be due in July 2023 patient's last colonoscopy was performed by Dr. Tripp in August 2020

## 2023-04-21 NOTE — HEALTH RISK ASSESSMENT
[Yes] : Yes [No falls in past year] : Patient reported no falls in the past year [0] : 2) Feeling down, depressed, or hopeless: Not at all (0) [PHQ-2 Negative - No further assessment needed] : PHQ-2 Negative - No further assessment needed [de-identified] : Occasional [[id number]] : 0

## 2023-04-21 NOTE — REVIEW OF SYSTEMS
[Fever] : no fever [Chills] : no chills [Fatigue] : no fatigue [Hot Flashes] : no hot flashes [Night Sweats] : no night sweats [Recent Change In Weight] : ~T no recent weight change [Discharge] : no discharge [Pain] : no pain [Redness] : no redness [Dryness] : no dryness  [Vision Problems] : no vision problems [Itching] : no itching [Earache] : no earache [Hearing Loss] : hearing loss [Nosebleed] : no nosebleeds [Hoarseness] : no hoarseness [Nasal Discharge] : no nasal discharge [Sore Throat] : no sore throat [Postnasal Drip] : no postnasal drip [Chest Pain] : no chest pain [Palpitations] : no palpitations [Leg Claudication] : no leg claudication [Lower Ext Edema] : no lower extremity edema [Orthopnea] : no orthopnea [Paroxysmal Nocturnal Dyspnea] : no paroxysmal nocturnal dyspnea [Shortness Of Breath] : no shortness of breath [Wheezing] : no wheezing [Cough] : no cough [Dyspnea on Exertion] : no dyspnea on exertion [Abdominal Pain] : no abdominal pain [Nausea] : no nausea [Constipation] : constipation [Diarrhea] : diarrhea [Vomiting] : no vomiting [Heartburn] : no heartburn [Melena] : no melena [Dysuria] : no dysuria [Incontinence] : no incontinence [Nocturia] : nocturia [Poor Libido] : libido not poor [Hematuria] : no hematuria [Frequency] : frequency [Vaginal Discharge] : no vaginal discharge [Joint Pain] : joint pain [Joint Stiffness] : joint stiffness [Joint Swelling] : no joint swelling [Muscle Weakness] : no muscle weakness [Muscle Pain] : no muscle pain [Back Pain] : no back pain [Itching] : no itching [Mole Changes] : no mole changes [Nail Changes] : no nail changes [Hair Changes] : no hair changes [Skin Rash] : no skin rash [Headache] : no headache [Dizziness] : no dizziness [Fainting] : no fainting [Confusion] : no confusion [Memory Loss] : no memory loss [Unsteady Walking] : ataxia [Suicidal] : not suicidal [Insomnia] : no insomnia [Anxiety] : anxiety [Depression] : depression [Easy Bleeding] : no easy bleeding [Easy Bruising] : no easy bruising [Swollen Glands] : no swollen glands [Negative] : Heme/Lymph [FreeTextEntry7] : bloating [FreeTextEntry9] : r hip

## 2023-06-05 ENCOUNTER — APPOINTMENT (OUTPATIENT)
Dept: OBGYN | Facility: CLINIC | Age: 83
End: 2023-06-05
Payer: MEDICARE

## 2023-06-05 VITALS
BODY MASS INDEX: 23.05 KG/M2 | DIASTOLIC BLOOD PRESSURE: 80 MMHG | OXYGEN SATURATION: 99 % | TEMPERATURE: 97.4 F | WEIGHT: 135 LBS | HEART RATE: 68 BPM | HEIGHT: 64 IN | SYSTOLIC BLOOD PRESSURE: 140 MMHG

## 2023-06-05 DIAGNOSIS — Z01.419 ENCOUNTER FOR GYNECOLOGICAL EXAMINATION (GENERAL) (ROUTINE) W/OUT ABNORMAL FINDINGS: ICD-10-CM

## 2023-06-05 PROCEDURE — G0101: CPT

## 2023-06-05 NOTE — PHYSICAL EXAM
[Chaperone Present] : A chaperone was present in the examining room during all aspects of the physical examination [FreeTextEntry1] : JESSIE Ortiz [Appropriately responsive] : appropriately responsive [Alert] : alert [No Acute Distress] : no acute distress [No Lymphadenopathy] : no lymphadenopathy [No Murmurs] : no murmurs [Non-tender] : non-tender [Non-distended] : non-distended [No HSM] : No HSM [No Lesions] : no lesions [No Mass] : no mass [Oriented x3] : oriented x3 [Examination Of The Breasts] : a normal appearance [No Discharge] : no discharge [No Masses] : no breast masses were palpable [Labia Majora] : normal [Labia Minora] : normal [Atrophy] : atrophy [No Bleeding] : There was no active vaginal bleeding [Soft] : soft [Normal] : normal [Normal Position] : in a normal position [Tenderness] : nontender [Enlarged ___ wks] : not enlarged [Mass ___ cm] : no uterine mass was palpated [Uterine Adnexae] : normal [FreeTextEntry9] : Last colonoscopy 2020

## 2023-06-05 NOTE — HISTORY OF PRESENT ILLNESS
[LMPDate] : age 38 [[facility] : 3 [[facility] Tucsoniving] : 3 [FreeTextEntry1] :  X 3

## 2023-06-09 ENCOUNTER — APPOINTMENT (OUTPATIENT)
Dept: FAMILY MEDICINE | Facility: CLINIC | Age: 83
End: 2023-06-09
Payer: MEDICARE

## 2023-06-09 VITALS
WEIGHT: 135 LBS | BODY MASS INDEX: 23.05 KG/M2 | RESPIRATION RATE: 16 BRPM | DIASTOLIC BLOOD PRESSURE: 78 MMHG | HEIGHT: 64 IN | OXYGEN SATURATION: 96 % | TEMPERATURE: 97.2 F | HEART RATE: 71 BPM | SYSTOLIC BLOOD PRESSURE: 128 MMHG

## 2023-06-09 DIAGNOSIS — H81.10 BENIGN PAROXYSMAL VERTIGO, UNSPECIFIED EAR: ICD-10-CM

## 2023-06-09 DIAGNOSIS — S72.001A FRACTURE OF UNSPECIFIED PART OF NECK OF RIGHT FEMUR, INITIAL ENCOUNTER FOR CLOSED FRACTURE: ICD-10-CM

## 2023-06-09 PROCEDURE — 99214 OFFICE O/P EST MOD 30 MIN: CPT

## 2023-06-09 RX ORDER — ASPIRIN ENTERIC COATED TABLETS 81 MG 81 MG/1
81 TABLET, DELAYED RELEASE ORAL
Qty: 60 | Refills: 3 | Status: COMPLETED | COMMUNITY
Start: 2023-03-24 | End: 2023-06-09

## 2023-06-09 NOTE — HISTORY OF PRESENT ILLNESS
[de-identified] : For three weeks feels light headed " feels a little drunk ."  Has been persistent for three week. \par MOstly prominent with walking. \par Continues as long as she is walking and stops with rest. \par Also noted when she moves her head forward when she is planting. \par Denies any headaches, visual changes, weakness, malaise, palpitations or chest pains. \par Does not feel that she may pass out. \par \par DId loose her balance and broke her hip in march. \par

## 2023-06-09 NOTE — ASSESSMENT
[FreeTextEntry1] : Appears that patient is suffering from positional vertigo. Will trial meclizine. Since it has been persistent for 3 weeks already will refer for vestibular therapy and ent referral if symptoms are persistent. \par Symptoms acutely worsen or if she develops a headache or visual changes - rto and will consider imaging at that time. \par \par Has stopped amlodipine. Was informed by pcp per patient. BP acceptable at visit.\par \par \par Questionable pathological fracture. Patient did say the fall was from a height with moderate force. BOne density ordered

## 2023-06-09 NOTE — PHYSICAL EXAM
[No Acute Distress] : no acute distress [Well Nourished] : well nourished [Well Developed] : well developed [Well-Appearing] : well-appearing [Normal Sclera/Conjunctiva] : normal sclera/conjunctiva [PERRL] : pupils equal round and reactive to light [EOMI] : extraocular movements intact [Normal Outer Ear/Nose] : the outer ears and nose were normal in appearance [Normal Oropharynx] : the oropharynx was normal [No JVD] : no jugular venous distention [No Lymphadenopathy] : no lymphadenopathy [Supple] : supple [Thyroid Normal, No Nodules] : the thyroid was normal and there were no nodules present [No Respiratory Distress] : no respiratory distress  [No Accessory Muscle Use] : no accessory muscle use [Clear to Auscultation] : lungs were clear to auscultation bilaterally [Normal Rate] : normal rate  [Regular Rhythm] : with a regular rhythm [Normal S1, S2] : normal S1 and S2 [No Murmur] : no murmur heard [No Carotid Bruits] : no carotid bruits [No Abdominal Bruit] : a ~M bruit was not heard ~T in the abdomen [No Varicosities] : no varicosities [Pedal Pulses Present] : the pedal pulses are present [No Edema] : there was no peripheral edema [No Palpable Aorta] : no palpable aorta [No Extremity Clubbing/Cyanosis] : no extremity clubbing/cyanosis [Soft] : abdomen soft [Non Tender] : non-tender [Non-distended] : non-distended [No Masses] : no abdominal mass palpated [No HSM] : no HSM [Normal Bowel Sounds] : normal bowel sounds [Normal Posterior Cervical Nodes] : no posterior cervical lymphadenopathy [Normal Anterior Cervical Nodes] : no anterior cervical lymphadenopathy [No CVA Tenderness] : no CVA  tenderness [No Spinal Tenderness] : no spinal tenderness [No Joint Swelling] : no joint swelling [Grossly Normal Strength/Tone] : grossly normal strength/tone [No Rash] : no rash [Coordination Grossly Intact] : coordination grossly intact [No Focal Deficits] : no focal deficits [Normal Gait] : normal gait [Deep Tendon Reflexes (DTR)] : deep tendon reflexes were 2+ and symmetric [Normal] : the deep tendon reflexes were normal [Normal Affect] : the affect was normal [Normal Insight/Judgement] : insight and judgment were intact [de-identified] : Positive [place] hallpike maneuver

## 2023-06-25 LAB
CYTOLOGY CVX/VAG DOC THIN PREP: ABNORMAL
HPV HIGH+LOW RISK DNA PNL CVX: NOT DETECTED

## 2023-07-06 NOTE — OCCUPATIONAL THERAPY INITIAL EVALUATION ADULT - GENERAL OBSERVATIONS, REHAB EVAL
Pt received supine in bed, +right IV, +tele/pulse ox and agreed to participate in OT session
stretcher

## 2023-07-10 ENCOUNTER — OUTPATIENT (OUTPATIENT)
Dept: OUTPATIENT SERVICES | Facility: HOSPITAL | Age: 83
LOS: 1 days | End: 2023-07-10
Payer: COMMERCIAL

## 2023-07-10 ENCOUNTER — RESULT REVIEW (OUTPATIENT)
Age: 83
End: 2023-07-10

## 2023-07-10 ENCOUNTER — APPOINTMENT (OUTPATIENT)
Dept: MAMMOGRAPHY | Facility: HOSPITAL | Age: 83
End: 2023-07-10
Payer: MEDICARE

## 2023-07-10 DIAGNOSIS — Z01.419 ENCOUNTER FOR GYNECOLOGICAL EXAMINATION (GENERAL) (ROUTINE) WITHOUT ABNORMAL FINDINGS: ICD-10-CM

## 2023-07-10 DIAGNOSIS — Z90.49 ACQUIRED ABSENCE OF OTHER SPECIFIED PARTS OF DIGESTIVE TRACT: Chronic | ICD-10-CM

## 2023-07-10 DIAGNOSIS — Z90.89 ACQUIRED ABSENCE OF OTHER ORGANS: Chronic | ICD-10-CM

## 2023-07-10 PROCEDURE — 77063 BREAST TOMOSYNTHESIS BI: CPT

## 2023-07-10 PROCEDURE — 77067 SCR MAMMO BI INCL CAD: CPT | Mod: 26

## 2023-07-10 PROCEDURE — 77063 BREAST TOMOSYNTHESIS BI: CPT | Mod: 26

## 2023-07-10 PROCEDURE — 77067 SCR MAMMO BI INCL CAD: CPT

## 2023-08-07 ENCOUNTER — LABORATORY RESULT (OUTPATIENT)
Age: 83
End: 2023-08-07

## 2023-08-07 ENCOUNTER — APPOINTMENT (OUTPATIENT)
Dept: INTERNAL MEDICINE | Facility: CLINIC | Age: 83
End: 2023-08-07
Payer: MEDICARE

## 2023-08-07 VITALS
TEMPERATURE: 97 F | DIASTOLIC BLOOD PRESSURE: 90 MMHG | OXYGEN SATURATION: 96 % | SYSTOLIC BLOOD PRESSURE: 136 MMHG | HEART RATE: 82 BPM

## 2023-08-07 DIAGNOSIS — Z00.00 ENCOUNTER FOR GENERAL ADULT MEDICAL EXAMINATION W/OUT ABNORMAL FINDINGS: ICD-10-CM

## 2023-08-07 DIAGNOSIS — Z23 ENCOUNTER FOR IMMUNIZATION: ICD-10-CM

## 2023-08-07 PROCEDURE — G0009: CPT

## 2023-08-07 PROCEDURE — 99215 OFFICE O/P EST HI 40 MIN: CPT | Mod: 25

## 2023-08-07 PROCEDURE — 90677 PCV20 VACCINE IM: CPT

## 2023-08-07 NOTE — HEALTH RISK ASSESSMENT
[Very Good] : ~his/her~ current health as very good [Yes] : Yes [4 or more  times a week (4 pts)] : 4 or more  times a week (4 points) [1 or 2 (0 pts)] : 1 or 2 (0 points) [No] : In the past 12 months have you used drugs other than those required for medical reasons? No [Any fall with injury in past year] : Patient reported fall with injury in the past year [0] : 2) Feeling down, depressed, or hopeless: Not at all (0) [PHQ-2 Negative - No further assessment needed] : PHQ-2 Negative - No further assessment needed [Audit-CScore] : 4 [de-identified] : Physically active [de-identified] : Quite healthy [Magaly] : 9 seconds [[id number]] : 0 [Patient reported mammogram was normal] : Patient reported mammogram was normal [Patient reported PAP Smear was normal] : Patient reported PAP Smear was normal [Patient reported colonoscopy was normal] : Patient reported colonoscopy was normal [HIV test declined] : HIV test declined [Hepatitis C test declined] : Hepatitis C test declined [Change in mental status noted] : No change in mental status noted [Language] : denies difficulty with language [Behavior] : denies difficulty with behavior [Learning/Retaining New Information] : denies difficulty learning/retaining new information [Handling Complex Tasks] : denies difficulty handling complex tasks [Reasoning] : denies difficulty with reasoning [Spatial Ability and Orientation] : denies difficulty with spatial ability and orientation [None] : None [# of Members in Household ___] :  household currently consist of [unfilled] member(s) [College] : College [] :  [# Of Children ___] : has [unfilled] children [Sexually Active] : not sexually active [High Risk Behavior] : no high risk behavior [Feels Safe at Home] : Feels safe at home [Fully functional (bathing, dressing, toileting, transferring, walking, feeding)] : Fully functional (bathing, dressing, toileting, transferring, walking, feeding) [Fully functional (using the telephone, shopping, preparing meals, housekeeping, doing laundry, using] : Fully functional and needs no help or supervision to perform IADLs (using the telephone, shopping, preparing meals, housekeeping, doing laundry, using transportation, managing medications and managing finances) [Reports changes in hearing] : Reports no changes in hearing [Reports changes in vision] : Reports no changes in vision [Reports normal functional visual acuity (ie: able to read med bottle)] : Reports normal functional visual acuity [Reports changes in dental health] : Reports no changes in dental health [Smoke Detector] : smoke detector [Carbon Monoxide Detector] : carbon monoxide detector [Guns at Home] : no guns at home [Safety elements used in home] : safety elements used in home [Seat Belt] :  uses seat belt [Sunscreen] : does not use sunscreen [Travel to Developing Areas] : does not  travel to developing areas [TB Exposure] : is not being exposed to tuberculosis [Caregiver Concerns] : does not have caregiver concerns [PapSmearDate] : 06/23 [PapSmearComments] :  Dr. Harris [BoneDensityDate] : 06/20 [BoneDensityComments] : Osteopenia -1.3 hip [ColonoscopyDate] : 08/20 [ColonoscopyComments] : Colon cancer 2000 2/2010 status post ileocecal resection. [FreeTextEntry3] : Children in [facility] and [place] [Never] : Never

## 2023-08-07 NOTE — HISTORY OF PRESENT ILLNESS
[FreeTextEntry1] : CPE [de-identified] : Most pleasant 83-year-old white female with a history of colon cancer 2002 as well as volvulus, fall complicated by right hip fracture in 2023, mild macrocytosis, biochemical hypothyroidism, previously treated hypertension, who was seen in June 2023 for 3-week history of gait unsteadiness, thought to have BPPV prescribed meclizine and referred to ENT (outside [facility]) and vestibular rehab.  She comes in today for annual physical, however extensive chart review and other issues precluded exam today.  She wonders if she should see a neurologist.  She denies true vertigo, more of a disequilibrium which is steady briefly worse with position change but clearly not fluctuating, strongly against BPPV.  Her mild right tinnitus is chronic not episodic or recently worsened, against Ménière's.  She has not had any intracranial imaging either at the time of her fall breaking her hip nor more recently.  She denies headache visual changes diplopia trouble swallowing.  She has no acute concerns.  She lost her  a year ago, and feels the Zoloft is quite helpful.  She quickly declines trial of Zoloft dosage reduction.

## 2023-08-07 NOTE — PHYSICAL EXAM
[Normal] : affect was normal and insight and judgment were intact [de-identified] : Cranial nerves II through XII grossly intact.  Symmetric VIKAS's.  Symmetrically diminished DTRs in all 4 extremities.  No resting tremor or cogwheeling.  Unable to tandem walk but negative Romberg and careful short narrow based gait.  No inducible nystagmus in vertical horizontal planes.

## 2023-08-07 NOTE — ASSESSMENT
[FreeTextEntry1] : *Disequilibrium, subacute.  Chronic right tinnitus.  Doubt BPPV given chronicity failure to improve with vestibular rehab.  Onset a few months after she fell and broke her hip, possible subdural hematoma.  Presentation would be atypical for PMR, complex migraine, UTI, intracranial vasculitis.  YON B12 folate sed rate EKG carotid ultrasound noncontrast head CT referral to neurology.  a?" Colon cancer.a?"Lymphocytic colitis, biopsy-proven August 2020.  Chart history of volvulus. Status post ileocecal resection, postop chemotherapy, reportedly node negative.  Colonoscopy Dr. Bailey 2020 without polyps, age 80.  Patient asked to see her oncologist in [place], as it has been a couple of years and she may have become lost to follow-up.  a?" Fall complicated by right hip fracture March 2023. a?" Osteopenia. Hospitalized then rehab.  Bone mineral density consistent with osteopenia 2020 (+0.5 spine, -1.3 hip); vitamin D 62 in 2022. Declines referral to endocrinology.  Declines update DEXA. Check vitamin D level.  a?" Prior history of hypertension.  Stopped amlodipine in 2022.  Blood pressures adequate control currently.  a?" Chronic mild macrocytosis.   since 2019.  Regular EtOH consumption may be primary etiology, MDS also possible but normal diff. TSH mildly elevated but adequate free T4, B12 levels consistent with supplementation Annual CBC.  Check reticulocyte count, B12 TSH free light chain  a?" Right-sided tinnitus.  Chronic.  No intracranial imaging in [facility]. Ref to Neuro for this and 3 month h/o dysequilibrium  a?" Biochemical hypothyroidism.  TSH around 5 since 2017. Update.  a?" Routine adult health maintenance. Mammogram and paps at age 83 through her gynecologist!  No family or personal history of breast cancer.I invited her to discuss mammogram discontinuation with Dr Harris. Colonoscopy 2020 no polyps Tdap w/in 10yrs Prevnar 20 today Shingrix  '21COVID next booster this fall.  It was a pleasure visiting with Ms. Belkys millard today.  Answered all questions as best I could. Disposition follow-up for full physical after labs, neurology and imaging studies. Time 45 minutes

## 2023-08-31 ENCOUNTER — RESULT REVIEW (OUTPATIENT)
Age: 83
End: 2023-08-31

## 2023-08-31 ENCOUNTER — APPOINTMENT (OUTPATIENT)
Dept: CT IMAGING | Facility: HOSPITAL | Age: 83
End: 2023-08-31
Payer: MEDICARE

## 2023-08-31 ENCOUNTER — OUTPATIENT (OUTPATIENT)
Dept: OUTPATIENT SERVICES | Facility: HOSPITAL | Age: 83
LOS: 1 days | End: 2023-08-31
Payer: COMMERCIAL

## 2023-08-31 ENCOUNTER — APPOINTMENT (OUTPATIENT)
Dept: ULTRASOUND IMAGING | Facility: HOSPITAL | Age: 83
End: 2023-08-31
Payer: MEDICARE

## 2023-08-31 DIAGNOSIS — Z90.89 ACQUIRED ABSENCE OF OTHER ORGANS: Chronic | ICD-10-CM

## 2023-08-31 DIAGNOSIS — K52.832 LYMPHOCYTIC COLITIS: ICD-10-CM

## 2023-08-31 DIAGNOSIS — Z90.49 ACQUIRED ABSENCE OF OTHER SPECIFIED PARTS OF DIGESTIVE TRACT: Chronic | ICD-10-CM

## 2023-08-31 DIAGNOSIS — Z00.8 ENCOUNTER FOR OTHER GENERAL EXAMINATION: ICD-10-CM

## 2023-08-31 PROCEDURE — 70450 CT HEAD/BRAIN W/O DYE: CPT | Mod: 26

## 2023-08-31 PROCEDURE — 93880 EXTRACRANIAL BILAT STUDY: CPT | Mod: 26

## 2023-08-31 PROCEDURE — 70450 CT HEAD/BRAIN W/O DYE: CPT

## 2023-08-31 PROCEDURE — 93880 EXTRACRANIAL BILAT STUDY: CPT

## 2023-09-11 ENCOUNTER — APPOINTMENT (OUTPATIENT)
Dept: INTERNAL MEDICINE | Facility: CLINIC | Age: 83
End: 2023-09-11
Payer: MEDICARE

## 2023-09-11 VITALS
RESPIRATION RATE: 16 BRPM | BODY MASS INDEX: 22.71 KG/M2 | WEIGHT: 133 LBS | HEIGHT: 64 IN | HEART RATE: 65 BPM | TEMPERATURE: 97.3 F | SYSTOLIC BLOOD PRESSURE: 130 MMHG | DIASTOLIC BLOOD PRESSURE: 90 MMHG | OXYGEN SATURATION: 97 %

## 2023-09-11 DIAGNOSIS — D75.89 OTHER SPECIFIED DISEASES OF BLOOD AND BLOOD-FORMING ORGANS: ICD-10-CM

## 2023-09-11 DIAGNOSIS — R79.89 OTHER SPECIFIED ABNORMAL FINDINGS OF BLOOD CHEMISTRY: ICD-10-CM

## 2023-09-11 DIAGNOSIS — R76.8 OTHER SPECIFIED ABNORMAL IMMUNOLOGICAL FINDINGS IN SERUM: ICD-10-CM

## 2023-09-11 LAB
ALBUMIN SERPL ELPH-MCNC: 4.7 G/DL
ALP BLD-CCNC: 83 U/L
ALT SERPL-CCNC: 22 U/L
ANA PAT FLD IF-IMP: NORMAL
ANACR T: ABNORMAL
ANION GAP SERPL CALC-SCNC: 14 MMOL/L
APPEARANCE: CLEAR
AST SERPL-CCNC: 32 U/L
BILIRUB SERPL-MCNC: 0.5 MG/DL
BILIRUBIN URINE: NEGATIVE
BLOOD URINE: NEGATIVE
BUN SERPL-MCNC: 15 MG/DL
CALCIUM SERPL-MCNC: 9.7 MG/DL
CHLORIDE SERPL-SCNC: 105 MMOL/L
CO2 SERPL-SCNC: 23 MMOL/L
COLOR: YELLOW
CREAT SERPL-MCNC: 0.81 MG/DL
CREAT SPEC-SCNC: 67 MG/DL
CREAT/PROT UR: 0.1 RATIO
CRP SERPL-MCNC: <3 MG/L
DEPRECATED KAPPA LC FREE/LAMBDA SER: 1.78 RATIO
EGFR: 71 ML/MIN/1.73M2
ERYTHROCYTE [SEDIMENTATION RATE] IN BLOOD BY WESTERGREN METHOD: 14 MM/HR
ESTIMATED AVERAGE GLUCOSE: 103 MG/DL
FOLATE SERPL-MCNC: >20 NG/ML
GLUCOSE QUALITATIVE U: NEGATIVE MG/DL
HBA1C MFR BLD HPLC: 5.2 %
KAPPA LC CSF-MCNC: 0.93 MG/DL
KAPPA LC SERPL-MCNC: 1.66 MG/DL
KETONES URINE: NEGATIVE MG/DL
LEUKOCYTE ESTERASE URINE: NEGATIVE
NITRITE URINE: NEGATIVE
PH URINE: 6
POTASSIUM SERPL-SCNC: 4.4 MMOL/L
PROT SERPL-MCNC: 6.9 G/DL
PROT UR-MCNC: 8 MG/DL
PROTEIN URINE: NEGATIVE MG/DL
RBC # BLD: 4.16 M/UL
RETICS # AUTO: 1.7 %
RETICS AGGREG/RBC NFR: 70.3 K/UL
SODIUM SERPL-SCNC: 141 MMOL/L
SPECIFIC GRAVITY URINE: 1.01
TSH SERPL-ACNC: 6.92 UIU/ML
UROBILINOGEN URINE: 0.2 MG/DL
VIT B12 SERPL-MCNC: 1357 PG/ML

## 2023-09-11 PROCEDURE — 99214 OFFICE O/P EST MOD 30 MIN: CPT

## 2023-10-17 NOTE — OCCUPATIONAL THERAPY INITIAL EVALUATION ADULT - PHYSICAL ASSIST/NONPHYSICAL ASSIST: SIT/STAND, REHAB EVAL
After this procedure:    - Please rest for 24 hours, no strenuous activity. Resume normal activities after 24 hours.  - No soaking in bath tubs, hot tubs, or swimming pools for 24 hours  - You may shower in the morning.  - Keep your band aid in place for 24 hours.  - Do not schedule any vaccines 1 week before or 1 week after your procedure.        - Immediately following the procedure, it is possible that your legs or arms may feel shaky or weak. These sensations are temporary. Please alert the staff if this occurs, as we do not want you to fall. Even though you may feel strong enough, the numbing medicine is much stronger and you risk falling and injuring yourself further.    - Tenderness at the site of the injection is possible but usually minimal. If the pain is too bothersome, take anti-inflammatory medications or acetaminophen (both sold over-the- counter). A cold compress can be used for 15 minutes intervals 1-2 hours as needed. Do not use heat, gels, creams, or patches at the injection site for the first 24 hours.    - If you were asked to hold certain medications for the procedure, you may restart today, unless advised otherwise.    If you experience:  Fever, chills, swelling,or drainage from the injection site  Severe arm/leg weakness, severe headache that is worse when standing up  Shortness of breath or chest pain  New/sudden changes in urination/bowel control (incontinence)  Worsening pain several days after the procedure   You should call the clinic immediately or go to the nearest Emergency Department for evaluation    ++If unable to reach your physician or the clinic, call 911 or go to the nearest hospital Emergency Department++    If you have diabetes,  - Monitor blood glucose 3 times per day for 3 days  - If your blood sugar is above 200, notify your PCP/MD   
1 person assist

## 2023-10-19 ENCOUNTER — APPOINTMENT (OUTPATIENT)
Dept: NEUROLOGY | Facility: CLINIC | Age: 83
End: 2023-10-19
Payer: MEDICARE

## 2023-10-19 VITALS
WEIGHT: 133 LBS | DIASTOLIC BLOOD PRESSURE: 91 MMHG | HEIGHT: 64 IN | TEMPERATURE: 97 F | OXYGEN SATURATION: 94 % | HEART RATE: 79 BPM | SYSTOLIC BLOOD PRESSURE: 182 MMHG | BODY MASS INDEX: 22.71 KG/M2

## 2023-10-19 DIAGNOSIS — R26.9 UNSPECIFIED ABNORMALITIES OF GAIT AND MOBILITY: ICD-10-CM

## 2023-10-19 PROCEDURE — 99205 OFFICE O/P NEW HI 60 MIN: CPT

## 2023-10-19 PROCEDURE — 99215 OFFICE O/P EST HI 40 MIN: CPT

## 2023-10-19 RX ORDER — MECLIZINE HYDROCHLORIDE 12.5 MG/1
12.5 TABLET ORAL 3 TIMES DAILY
Qty: 30 | Refills: 0 | Status: DISCONTINUED | COMMUNITY
Start: 2023-06-09 | End: 2023-10-19

## 2023-10-19 RX ORDER — POLYETHYLENE GLYCOL 3350 17 G/17G
17 POWDER, FOR SOLUTION ORAL DAILY
Qty: 1 | Refills: 3 | Status: COMPLETED | COMMUNITY
Start: 2023-03-24 | End: 2023-10-19

## 2024-01-01 NOTE — ED PROVIDER NOTE - CPE EDP GASTRO NORM
This is to inform you that baby has been seen since discharge    Day of Life: 3    : 24 @ 0346     GA: 39.0    Mom's blood type: B+    Birth weight: 7-13.2 lb (3550g)    Discharge weight:7-12.2 lb (3520g)    Today's weight: 7-8.5 lb (3415g)    Weight loss: -3.8%    Bilizap: (draw serum if within 3 mg/dl of phototherapy on graph): 19.2    Today  Total neobili: 16.8    Infant feeding (type and how often in the last 24 hours): formula feeding baby 1.5-2 oz every 3.5-4 hours.    Stools (in the last 24 hours): 3    Voids (in the last 24 hours): 6+    Color: jaundice  Gums: moist  Skin: warm/dry  Cord: dry  Circumcision: healing, gauze and vaseline  Fontanels: soft/flat  Activity: alert/active    Education to mother: Baby was due to eat, baby ate 2 oz of formula, tolerated wel. Make sure baby is eating 2 oz of formula every 2-3 hours.  Jaundice precautions discussed, mother knows to bring baby back for evaluation sooner if needed, if whites of baby's eyes become yellow, difficulty with waking baby for feedings and no stools. Instructed to place baby were baby can get indirect sunlight, to help eliminate jaundice. Reminded mother to increase feedings, the more baby eats the more baby poops. Mother verbalizes understanding.      1635 Uma Gaines [facility] notified of neobili, weight, weight loss%, risk factors and feedings. Orders received to return in 2 days for repeat weight check.  1636 Mother called informed of neobili and order, appointment made for Thursday at 1300.    Instructions to mother: [phone] Mary will call after getting results and talking with NNP     
normal...

## 2024-02-01 PROBLEM — R79.89 ELEVATED TSH: Status: ACTIVE | Noted: 2023-10-19

## 2024-02-01 PROBLEM — R76.8 ELEVATED ANTINUCLEAR ANTIBODY (ANA) LEVEL: Status: ACTIVE | Noted: 2024-02-01

## 2024-02-01 PROBLEM — D75.89 MACROCYTOSIS WITHOUT ANEMIA: Status: ACTIVE | Noted: 2023-08-07

## 2024-02-01 NOTE — PHYSICAL EXAM
[No Acute Distress] : no acute distress [Well Nourished] : well nourished [Well Developed] : well developed [Well-Appearing] : well-appearing [Normal Sclera/Conjunctiva] : normal sclera/conjunctiva [PERRL] : pupils equal round and reactive to light [EOMI] : extraocular movements intact [Normal Outer Ear/Nose] : the outer ears and nose were normal in appearance [Normal Oropharynx] : the oropharynx was normal [No JVD] : no jugular venous distention [No Lymphadenopathy] : no lymphadenopathy [Supple] : supple [Thyroid Normal, No Nodules] : the thyroid was normal and there were no nodules present [No Respiratory Distress] : no respiratory distress  [No Accessory Muscle Use] : no accessory muscle use [Clear to Auscultation] : lungs were clear to auscultation bilaterally [Normal Rate] : normal rate  [Regular Rhythm] : with a regular rhythm [Normal S1, S2] : normal S1 and S2 [No Murmur] : no murmur heard [No Carotid Bruits] : no carotid bruits [No Abdominal Bruit] : a ~M bruit was not heard ~T in the abdomen [No Varicosities] : no varicosities [Pedal Pulses Present] : the pedal pulses are present [No Edema] : there was no peripheral edema [No Palpable Aorta] : no palpable aorta [No Extremity Clubbing/Cyanosis] : no extremity clubbing/cyanosis [Soft] : abdomen soft [Non Tender] : non-tender [Non-distended] : non-distended [No Masses] : no abdominal mass palpated [No HSM] : no HSM [Normal Bowel Sounds] : normal bowel sounds [Declined Rectal Exam] : declined rectal exam [Normal Supraclavicular Nodes] : no supraclavicular lymphadenopathy [Normal Axillary Nodes] : no axillary lymphadenopathy [Normal Posterior Cervical Nodes] : no posterior cervical lymphadenopathy [Normal Anterior Cervical Nodes] : no anterior cervical lymphadenopathy [No CVA Tenderness] : no CVA  tenderness [No Spinal Tenderness] : no spinal tenderness [Kyphosis] : kyphosis [No Joint Swelling] : no joint swelling [Grossly Normal Strength/Tone] : grossly normal strength/tone [No Rash] : no rash [Coordination Grossly Intact] : coordination grossly intact [No Focal Deficits] : no focal deficits [Normal Gait] : normal gait [Deep Tendon Reflexes (DTR)] : deep tendon reflexes were 2+ and symmetric [Normal Affect] : the affect was normal [Normal Insight/Judgement] : insight and judgment were intact [Normal] : affect was normal and insight and judgment were intact [de-identified] : hearing aids bilaterally [de-identified] : Mild osteoarthritic changes [de-identified] : [place] Hallpike negative. Mild Romberg, cannot walk heel to toe.  No nystagmus on smooth pursuit.

## 2024-02-01 NOTE — PHYSICAL EXAM
[No Acute Distress] : no acute distress [Well Nourished] : well nourished [Well Developed] : well developed [Well-Appearing] : well-appearing [Normal Sclera/Conjunctiva] : normal sclera/conjunctiva [PERRL] : pupils equal round and reactive to light [EOMI] : extraocular movements intact [Normal Outer Ear/Nose] : the outer ears and nose were normal in appearance [Normal Oropharynx] : the oropharynx was normal [No JVD] : no jugular venous distention [No Lymphadenopathy] : no lymphadenopathy [Supple] : supple [Thyroid Normal, No Nodules] : the thyroid was normal and there were no nodules present [No Respiratory Distress] : no respiratory distress  [No Accessory Muscle Use] : no accessory muscle use [Clear to Auscultation] : lungs were clear to auscultation bilaterally [Normal Rate] : normal rate  [Regular Rhythm] : with a regular rhythm [Normal S1, S2] : normal S1 and S2 [No Murmur] : no murmur heard [No Carotid Bruits] : no carotid bruits [No Abdominal Bruit] : a ~M bruit was not heard ~T in the abdomen [No Varicosities] : no varicosities [Pedal Pulses Present] : the pedal pulses are present [No Edema] : there was no peripheral edema [No Palpable Aorta] : no palpable aorta [No Extremity Clubbing/Cyanosis] : no extremity clubbing/cyanosis [Soft] : abdomen soft [Non Tender] : non-tender [Non-distended] : non-distended [No Masses] : no abdominal mass palpated [No HSM] : no HSM [Normal Bowel Sounds] : normal bowel sounds [Declined Rectal Exam] : declined rectal exam [Normal Supraclavicular Nodes] : no supraclavicular lymphadenopathy [Normal Axillary Nodes] : no axillary lymphadenopathy [Normal Posterior Cervical Nodes] : no posterior cervical lymphadenopathy [Normal Anterior Cervical Nodes] : no anterior cervical lymphadenopathy [No CVA Tenderness] : no CVA  tenderness [No Spinal Tenderness] : no spinal tenderness [Kyphosis] : kyphosis [No Joint Swelling] : no joint swelling [Grossly Normal Strength/Tone] : grossly normal strength/tone [No Rash] : no rash [Coordination Grossly Intact] : coordination grossly intact [No Focal Deficits] : no focal deficits [Normal Gait] : normal gait [Deep Tendon Reflexes (DTR)] : deep tendon reflexes were 2+ and symmetric [Normal Affect] : the affect was normal [Normal Insight/Judgement] : insight and judgment were intact [Normal] : affect was normal and insight and judgment were intact [de-identified] : hearing aids bilaterally [de-identified] : Mild osteoarthritic changes [de-identified] : Start Hallpike negative. Mild Romberg, cannot walk heel to toe.  No nystagmus on smooth pursuit.

## 2024-02-02 NOTE — HISTORY OF PRESENT ILLNESS
[FreeTextEntry1] : Follow-up visit [de-identified] : Most pleasant 83-year-old white female with a history of colon cancer 2002 as well as volvulus, fall complicated by right hip fracture in 2023, mild macrocytosis, biochemical hypothyroidism, previously treated hypertension, who was seen in June 2023 for 3-week history of gait unsteadiness, thought to have BPPV prescribed meclizine and referred to ENT (outside [facility]) and vestibular rehab.  She comes in today for annual physical, however extensive chart review and other issues precluded exam today.  She wonders if she should see a neurologist.  She denies true vertigo, more of a disequilibrium which is steady briefly worse with position change but clearly not fluctuating, strongly against BPPV.  Her mild right tinnitus is chronic not episodic or recently worsened, against Meniere's.  She has not had any intracranial imaging either at the time of her fall breaking her hip nor more recently.  She denies headache visual changes diplopia trouble swallowing.  She has no acute concerns.  She lost her  a year ago, and feels the Zoloft is quite helpful.  She quickly declines trial of Zoloft dosage reduction.

## 2024-02-02 NOTE — ASSESSMENT
[FreeTextEntry1] : *Disequilibrium, subacute.  Chronic right tinnitus.  Doubt BPPV given chronicity failure to improve with vestibular rehab.  Onset a few months after she fell and broke her hip, possible subdural hematoma.  Presentation would be atypical for PMR, complex migraine, UTI, intracranial vasculitis.  YON B12 folate sed rate EKG carotid ultrasound noncontrast head CT referral to neurology.  a?" Colon cancer.a?"Lymphocytic colitis, biopsy-proven August 2020.  Chart history of volvulus. Status post ileocecal resection, postop chemotherapy, reportedly node negative.  Colonoscopy Dr. Bailey 2020 without polyps, age 80.  Patient asked to see her oncologist in [place], as it has been a couple of years and she may have become lost to follow-up.  a?" Fall complicated by right hip fracture March 2023. a?" Osteopenia. Hospitalized then rehab.  Bone mineral density consistent with osteopenia 2020 (+0.5 spine, -1.3 hip); vitamin D 62 in 2022. Declines referral to endocrinology.  Declines update DEXA. Check vitamin D level.  a?" Prior history of hypertension.  Stopped amlodipine in 2022.  Blood pressures adequate control currently.  a?" Chronic mild macrocytosis.   since 2019.  Regular EtOH consumption may be primary etiology, MDS also possible but normal diff. TSH mildly elevated but adequate free T4, B12 levels consistent with supplementation Annual CBC.  Check reticulocyte count, B12 TSH free light chain  a?" Right-sided tinnitus.  Chronic.  No intracranial imaging in [facility]. Ref to Neuro for this and 3 month h/o dysequilibrium  a?" Biochemical hypothyroidism.  TSH around 5 since 2017. Update.  a?" Routine adult health maintenance. Mammogram and paps at age 83 through her gynecologist!  No family or personal history of breast cancer.I invited her to discuss mammogram discontinuation with Dr Harris. Colonoscopy 2020 no polyps Tdap w/in 10yrs Prevnar 20 today Shingrix  '21COVID next booster this fall.  It was a pleasure visiting with Ms. Belkys millard today.  Answered all questions as best I could. Disposition follow-up for full physical after labs, neurology and imaging studies. Time 45 minutes

## 2024-02-02 NOTE — ASSESSMENT
[FreeTextEntry1] : *Disequilibrium, subacute.  Chronic right tinnitus.  Doubt BPPV given chronicity failure to improve with vestibular rehab.  Onset a few months after she fell and broke her hip, possible subdural hematoma.  Presentation would be atypical for PMR, complex migraine, UTI, intracranial vasculitis.  YON B12 folate sed rate EKG carotid ultrasound noncontrast head CT referral to neurology.  a?" Colon cancer.a?"Lymphocytic colitis, biopsy-proven August 2020.  Chart history of volvulus. Status post ileocecal resection, postop chemotherapy, reportedly node negative.  Colonoscopy Dr. Bailey 2020 without polyps, age 80.  Patient asked to see her oncologist in [place], as it has been a couple of years and she may have become lost to follow-up.  a?" Fall complicated by right hip fracture March 2023. a?" Osteopenia. Hospitalized then rehab.  Bone mineral density consistent with osteopenia 2020 (+0.5 spine, -1.3 hip); vitamin D 62 in 2022. Declines referral to endocrinology.  Declines update DEXA. Check vitamin D level.  a?" Prior history of hypertension.  Stopped amlodipine in 2022.  Blood pressures adequate control currently.  a?" Chronic mild macrocytosis.   since 2019.  Regular EtOH consumption may be primary etiology, MDS also possible but normal diff. TSH mildly elevated but adequate free T4, B12 levels consistent with supplementation Annual CBC.  Check reticulocyte count, B12 TSH free light chain  a?" Right-sided tinnitus.  Chronic.  No intracranial imaging in [facility]. Ref to Neuro for this and 3 month h/o dysequilibrium  a?" Biochemical hypothyroidism.  TSH around 5 since 2017. Update.  a?" Routine adult health maintenance. Mammogram and paps at age 83 through her gynecologist!  No family or personal history of breast cancer.I invited her to discuss mammogram discontinuation with Dr Harris. Colonoscopy 2020 no polyps Tdap w/in 10yrs Prevnar 20 today Shingrix  '21COVID next booster this fall.  It was a pleasure visiting with Ms. Belkys millard today.  Answered all questions as best I could. Disposition follow-up for full physical after labs, neurology and imaging studies. Time 45 minutes

## 2024-02-02 NOTE — HISTORY OF PRESENT ILLNESS
[FreeTextEntry1] : Follow-up visit [de-identified] : Most pleasant 83-year-old white female with a history of colon cancer 2002 as well as volvulus, fall complicated by right hip fracture in 2023, mild macrocytosis, biochemical hypothyroidism, previously treated hypertension, who was seen in June 2023 for 3-week history of gait unsteadiness, thought to have BPPV prescribed meclizine and referred to ENT (outside [facility]) and vestibular rehab.  She comes in today for annual physical, however extensive chart review and other issues precluded exam today.  She wonders if she should see a neurologist.  She denies true vertigo, more of a disequilibrium which is steady briefly worse with position change but clearly not fluctuating, strongly against BPPV.  Her mild right tinnitus is chronic not episodic or recently worsened, against Meniere's.  She has not had any intracranial imaging either at the time of her fall breaking her hip nor more recently.  She denies headache visual changes diplopia trouble swallowing.  She has no acute concerns.  She lost her  a year ago, and feels the Zoloft is quite helpful.  She quickly declines trial of Zoloft dosage reduction.

## 2024-04-01 ENCOUNTER — APPOINTMENT (OUTPATIENT)
Dept: GASTROENTEROLOGY | Facility: CLINIC | Age: 84
End: 2024-04-01
Payer: MEDICARE

## 2024-04-01 VITALS
OXYGEN SATURATION: 95 % | HEART RATE: 71 BPM | WEIGHT: 136 LBS | HEIGHT: 64 IN | SYSTOLIC BLOOD PRESSURE: 177 MMHG | RESPIRATION RATE: 15 BRPM | DIASTOLIC BLOOD PRESSURE: 95 MMHG | TEMPERATURE: 98 F | BODY MASS INDEX: 23.22 KG/M2

## 2024-04-01 DIAGNOSIS — K52.832 LYMPHOCYTIC COLITIS: ICD-10-CM

## 2024-04-01 DIAGNOSIS — Z85.038 PERSONAL HISTORY OF OTHER MALIGNANT NEOPLASM OF LARGE INTESTINE: ICD-10-CM

## 2024-04-01 DIAGNOSIS — K56.2 VOLVULUS: ICD-10-CM

## 2024-04-01 DIAGNOSIS — R10.84 GENERALIZED ABDOMINAL PAIN: ICD-10-CM

## 2024-04-01 DIAGNOSIS — R19.7 DIARRHEA, UNSPECIFIED: ICD-10-CM

## 2024-04-01 PROCEDURE — 99204 OFFICE O/P NEW MOD 45 MIN: CPT

## 2024-04-01 RX ORDER — ENEMA 19; 7 G/133ML; G/133ML
7-19 ENEMA RECTAL
Qty: 2 | Refills: 0 | Status: COMPLETED | COMMUNITY
Start: 2024-04-01 | End: 2024-04-02

## 2024-04-01 NOTE — ASSESSMENT
[FreeTextEntry1] : Discussed with patient.  I suspect this is a recurrence of lymphocytic colitis. This can be associated with sertraline, which she has been taking since at least 2020 when she was initially diagnosed by Dr. Tripp. However, I cannot rule out another cause at this time without some further testing. I recommended she get stool testing to evaluate for infection or inflammation. She agrees.  Will tentatively plan for flexible sigmoidoscopy to biopsy the colon if the above testing is unrevealing.  Explained the risks, benefits, indications, alternatives. The risks include but are not limited to bleeding, infection, perforation, reaction to anesthesia, or missed lesions. The patient verbalized understanding and wishes to proceed.  If lymphocytic colitis is noted on biopsies she may benefit from a course of budesonide. Long term management may mean DC'ing sertraline but we will reevaluate this after testing. In the meantime I recommended continuing Imodium, which she agrees to.  Follow up after testing. She agrees.

## 2024-04-01 NOTE — HISTORY OF PRESENT ILLNESS
[FreeTextEntry1] : Presents for reported watery bowel movements. Had last colonoscopy w/Dr. Tripp in 2020. Report reviewed. Lymphocytic colitis diagnosed. Treated with a course of budesonide. Of note she is taking sertraline (had been taking this medication when diagnosed w/lymphocytic colitis in 2020 as well). Now for the last 6-8 weeks she reports having loose watery stools different per her perception from previous experience. Denies rectal bleeding, weight loss, abdominal pain, n/v. Takes Imodium with improvement in BM frequency but still present.  Denies recent antibiotics, sick contacts, recent travel. 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 Montemayor). Op note reviewed.

## 2024-04-01 NOTE — PHYSICAL EXAM
[Alert] : alert [Normal Voice/Communication] : normal voice/communication [Healthy Appearing] : healthy appearing [No Acute Distress] : no acute distress [Sclera] : the sclera and conjunctiva were normal [Hearing Threshold Finger Rub Not [place]] : hearing was normal [Normal Lips/Gums] : the lips and gums were normal [Oropharynx] : the oropharynx was normal [Normal Appearance] : the appearance of the neck was normal [No Respiratory Distress] : no respiratory distress [No Neck Mass] : no neck mass was observed [No Acc Muscle Use] : no accessory muscle use [Respiration, Rhythm And Depth] : normal respiratory rhythm and effort [Auscultation Breath Sounds / Voice Sounds] : lungs were clear to auscultation bilaterally [Heart Rate And Rhythm] : heart rate was normal and rhythm regular [Normal S1, S2] : normal S1 and S2 [Murmurs] : no murmurs [Abdomen Tenderness] : non-tender [Bowel Sounds] : normal bowel sounds [No Masses] : no abdominal mass palpated [Abdomen Soft] : soft [No CVA Tenderness] : no CVA  tenderness [] : no hepatosplenomegaly [Normal Color / Pigmentation] : normal skin color and pigmentation [No Focal Deficits] : no focal deficits [Oriented To Time, Place, And Person] : oriented to person, place, and time [Involuntary Movements] : no involuntary movements were seen

## 2024-04-01 NOTE — REVIEW OF SYSTEMS
[Negative] : Heme/Lymph [As Noted in HPI] : as noted in HPI [Diarrhea] : diarrhea [Abdominal Pain] : no abdominal pain [Vomiting] : no vomiting [Melena (black stool)] : no melena [Bleeding] : no bleeding

## 2024-04-03 LAB — GI PCR PANEL: NOT DETECTED

## 2024-04-09 ENCOUNTER — NON-APPOINTMENT (OUTPATIENT)
Age: 84
End: 2024-04-09

## 2024-04-09 LAB
CALPROTECTIN FECAL: 57 UG/G
DEPRECATED O AND P PREP STL: NORMAL

## 2024-04-16 LAB — LACTOFERRIN STL-MCNC: <1 CD:794062635

## 2024-04-18 ENCOUNTER — RESULT REVIEW (OUTPATIENT)
Age: 84
End: 2024-04-18

## 2024-04-18 ENCOUNTER — APPOINTMENT (OUTPATIENT)
Dept: GASTROENTEROLOGY | Facility: HOSPITAL | Age: 84
End: 2024-04-18

## 2024-04-18 ENCOUNTER — OUTPATIENT (OUTPATIENT)
Dept: OUTPATIENT SERVICES | Facility: HOSPITAL | Age: 84
LOS: 1 days | End: 2024-04-18
Payer: COMMERCIAL

## 2024-04-18 DIAGNOSIS — Z90.89 ACQUIRED ABSENCE OF OTHER ORGANS: Chronic | ICD-10-CM

## 2024-04-18 DIAGNOSIS — R19.7 DIARRHEA, UNSPECIFIED: ICD-10-CM

## 2024-04-18 DIAGNOSIS — Z90.49 ACQUIRED ABSENCE OF OTHER SPECIFIED PARTS OF DIGESTIVE TRACT: Chronic | ICD-10-CM

## 2024-04-18 PROCEDURE — 45331 SIGMOIDOSCOPY AND BIOPSY: CPT

## 2024-04-18 PROCEDURE — 88305 TISSUE EXAM BY PATHOLOGIST: CPT

## 2024-04-18 PROCEDURE — 88305 TISSUE EXAM BY PATHOLOGIST: CPT | Mod: 26

## 2024-04-18 RX ORDER — SODIUM CHLORIDE 9 MG/ML
500 INJECTION INTRAMUSCULAR; INTRAVENOUS; SUBCUTANEOUS
Refills: 0 | Status: COMPLETED | OUTPATIENT
Start: 2024-04-18 | End: 2024-04-18

## 2024-04-18 RX ORDER — BUDESONIDE 3 MG/1
3 CAPSULE, COATED PELLETS ORAL
Qty: 90 | Refills: 3 | Status: ACTIVE | COMMUNITY
Start: 2024-04-18 | End: 1900-01-01

## 2024-04-18 RX ADMIN — SODIUM CHLORIDE 75 MILLILITER(S): 9 INJECTION INTRAMUSCULAR; INTRAVENOUS; SUBCUTANEOUS at 11:09

## 2024-04-22 ENCOUNTER — NON-APPOINTMENT (OUTPATIENT)
Age: 84
End: 2024-04-22

## 2024-04-22 ENCOUNTER — APPOINTMENT (OUTPATIENT)
Dept: INTERNAL MEDICINE | Facility: CLINIC | Age: 84
End: 2024-04-22
Payer: MEDICARE

## 2024-04-22 VITALS
WEIGHT: 132 LBS | HEIGHT: 64 IN | BODY MASS INDEX: 22.53 KG/M2 | HEART RATE: 77 BPM | DIASTOLIC BLOOD PRESSURE: 80 MMHG | OXYGEN SATURATION: 96 % | SYSTOLIC BLOOD PRESSURE: 120 MMHG | TEMPERATURE: 97 F | RESPIRATION RATE: 14 BRPM

## 2024-04-22 DIAGNOSIS — R42 DIZZINESS AND GIDDINESS: ICD-10-CM

## 2024-04-22 DIAGNOSIS — I10 ESSENTIAL (PRIMARY) HYPERTENSION: ICD-10-CM

## 2024-04-22 DIAGNOSIS — D47.2 MONOCLONAL GAMMOPATHY: ICD-10-CM

## 2024-04-22 LAB — SURGICAL PATHOLOGY STUDY: SIGNIFICANT CHANGE UP

## 2024-04-22 PROCEDURE — 99213 OFFICE O/P EST LOW 20 MIN: CPT

## 2024-04-22 RX ORDER — AMLODIPINE BESYLATE 5 MG/1
5 TABLET ORAL
Qty: 90 | Refills: 3 | Status: ACTIVE | COMMUNITY
Start: 2017-07-09 | End: 1900-01-01

## 2024-04-22 NOTE — ASSESSMENT
[FreeTextEntry1] : *Colon cancer, 2002 s/p resection and adjuvant chemotherapy. *Lymphocytic colitis, biopsy-proven August 2020. *Chart history of volvulus. Status post ileocecal resection, postop chemotherapy, reportedly node negative. Colonoscopy Dr. Bailey 2020 without polyps, age 80, apparent flexible sigmoidoscopy April 2024 by Dr. Couch results unknown to me.  Has noticed already improvement in her bowel habits since resuming budesonide 9 mg by Dr. Couch.  *Fall complicated by right hip fracture March 2023. *Osteopenia. Hospitalized then rehab. Bone mineral density consistent with osteopenia 2020 (+0.5 spine, -1.3 hip); vitamin D 62 in 2022. Declines referral to endocrinology. Declines update DEXA. Historically vitamin D levels on the high side last 62 in 2022. Check vitamin D level.  * Hypertension. *Mild elevation in kappa: Lambda free light chain Stopped amlodipine in 2022, resumed last week.  Refill sent today.  Blood pressures fair control 130s over 80-90, without proteinuria normal BMP last summer.  Invited to check weekly at home.  EKG without acute changes, inverted T wave V1 is chronic with borderline ST elevation in V2. Plan  CBC CMP BNP TSH fT4 urine alb, serum FLC.  *Chronic mild macrocytosis. *Elevated vitamin B12 *Elevated FLC ratio  since 2019. Regular EtOH consumption may be primary etiology, MDS also possible but normal diff. TSH mildly elevated but low normal free T4, B12 levels consistent with supplementation  *Minimally elevated serum kappa free light chain. No proteinuria. Updating now.  *Right-sided tinnitus. Chronic.   *Persistent disequilibrium, onset May 2023.  Head CT noncontrast and carotid US unremarkable, August 2023.  Last year, ref to Neuro for this and 3 month h/o dysequilibrium, who organized PT. patient did not pursue as she only started going to PT when she began to experience a disequilibrium a couple months out from her fall.  It troubles her when she gets up and moves, does not trouble her when she stands still.  It does not remit with continued movement, though symptoms are more severe later in the day.  At her request I messaged the neurologist she saw explaining why she did not follow through with the physical therapy that the neurologist had requested as she had already been doing physical therapy specifically for this disequilibrium in July and August 2023.  *Biochemical hypothyroidism. TSH around 5 since 2017 up to 7 currently, with free T4 at the lower limit of normal, 0.9-1.1 since 2020 on annual checks. Recommend recheck TSH in 6 months, may start on 25 mcg Synthroid daily replacement therapy if progresses. Possibly her disequilibrium is related to hypothyroidism. Updating TSH fT4 now.  *Routine adult health maintenance. Mammogram and paps at age 83 through her gynecologist. No family or personal history of breast cancer.I invited her to discuss mammogram discontinuation with Dr Harris. Colonoscopy 2020 no polyps, no family history colorectal cancer. Tdap w/in 10yrs, Prevnar 20 2023, Shingrix '21, UTD COVID influenza boosters   It was a pleasure visiting with Ms. Oropeza today. Answered all questions as best I could. Disposition follow-up 6 months. TSH/fT4/CBC/FLC/immunofixation/b12. Time 25min

## 2024-04-22 NOTE — HISTORY OF PRESENT ILLNESS
[FreeTextEntry1] : Elevated blood pressure [de-identified] : Most pleasant 83-year-old white female with a history of colon cancer 2002 as well as volvulus requiring right hemicolectomy in 2018, lymphocytic colitis, fall/right hip fracture 2023, mild macrocytosis, biochemical hypothyroidism, currently undergoing evaluation by GI for change in bowel habit, and history of primarily diastolic hypertension status post amlodipine discontinuation 2022 who presents for elevated blood pressure.  She lost her  a couple of years ago, and feels the Zoloft is quite helpful.  She historicallyHistorically declines trial of Zoloft dosage reduction.  GI has been concerned that Zoloft may have contributed to would be contributing to her GI symptoms.  For the bulk of [id number] blood pressures running 130s over 80-90 on several visits, until late 2023 blood pressure elevated at 182/91 and again on GI visit earlier this month 177/95.  Weight has been stable in the mid 130s.  Last urine and kidney function studies August 2023.  For elevated blood pressure at last medical encounter for colonoscopy, she went ahead and resumed amlodipine 5 mg daily blood pressures look fine today.  Denies PND orthopnea exertional chest pain TIA or stroke symptoms.  Got back on 9 mg budesonide with resolution of her GI symptoms.

## 2024-04-22 NOTE — PHYSICAL EXAM
[No Edema] : there was no peripheral edema [Kyphosis] : kyphosis [Normal] : affect was normal and insight and judgment were intact [de-identified] : No JVD or HJR.  Symmetric pulses in the upper extremities.

## 2024-04-23 LAB
25(OH)D3 SERPL-MCNC: 73.5 NG/ML
ALBUMIN SERPL ELPH-MCNC: 4.4 G/DL
ALP BLD-CCNC: 68 U/L
ALT SERPL-CCNC: 27 U/L
ANION GAP SERPL CALC-SCNC: 17 MMOL/L
AST SERPL-CCNC: 37 U/L
BASOPHILS # BLD AUTO: 0.06 K/UL
BASOPHILS NFR BLD AUTO: 0.9 %
BILIRUB SERPL-MCNC: 0.4 MG/DL
BUN SERPL-MCNC: 15 MG/DL
CALCIUM SERPL-MCNC: 9.9 MG/DL
CHLORIDE SERPL-SCNC: 103 MMOL/L
CO2 SERPL-SCNC: 20 MMOL/L
CREAT SERPL-MCNC: 0.74 MG/DL
CREAT SPEC-SCNC: 53 MG/DL
DEPRECATED KAPPA LC FREE/LAMBDA SER: 1.35 RATIO
EGFR: 79 ML/MIN/1.73M2
EOSINOPHIL # BLD AUTO: 0.14 K/UL
EOSINOPHIL NFR BLD AUTO: 2.1 %
GLUCOSE SERPL-MCNC: 102 MG/DL
HCT VFR BLD CALC: 44.6 %
HGB BLD-MCNC: 14.5 G/DL
IGA SER QL IEP: 172 MG/DL
IGG SER QL IEP: 1320 MG/DL
IGM SER QL IEP: 151 MG/DL
IMM GRANULOCYTES NFR BLD AUTO: 0.6 %
KAPPA LC CSF-MCNC: 1.75 MG/DL
KAPPA LC SERPL-MCNC: 2.36 MG/DL
LYMPHOCYTES # BLD AUTO: 1.44 K/UL
LYMPHOCYTES NFR BLD AUTO: 21.2 %
MAN DIFF?: NORMAL
MCHC RBC-ENTMCNC: 32.4 PG
MCHC RBC-ENTMCNC: 32.5 GM/DL
MCV RBC AUTO: 99.8 FL
MICROALBUMIN 24H UR DL<=1MG/L-MCNC: 2.5 MG/DL
MICROALBUMIN/CREAT 24H UR-RTO: 47 MG/G
MONOCYTES # BLD AUTO: 0.59 K/UL
MONOCYTES NFR BLD AUTO: 8.7 %
NEUTROPHILS # BLD AUTO: 4.53 K/UL
NEUTROPHILS NFR BLD AUTO: 66.5 %
NT-PROBNP SERPL-MCNC: 583 PG/ML
PLATELET # BLD AUTO: 238 K/UL
POTASSIUM SERPL-SCNC: 4.5 MMOL/L
PROT SERPL-MCNC: 7.5 G/DL
RBC # BLD: 4.47 M/UL
RBC # FLD: 13.9 %
SODIUM SERPL-SCNC: 140 MMOL/L
T4 FREE SERPL-MCNC: 1.1 NG/DL
TSH SERPL-ACNC: 7.3 UIU/ML
WBC # FLD AUTO: 6.8 K/UL

## 2024-05-07 ENCOUNTER — NON-APPOINTMENT (OUTPATIENT)
Age: 84
End: 2024-05-07

## 2024-05-07 ENCOUNTER — RX RENEWAL (OUTPATIENT)
Age: 84
End: 2024-05-07

## 2024-05-07 ENCOUNTER — APPOINTMENT (OUTPATIENT)
Dept: CARDIOLOGY | Facility: CLINIC | Age: 84
End: 2024-05-07
Payer: MEDICARE

## 2024-05-07 VITALS — DIASTOLIC BLOOD PRESSURE: 92 MMHG | OXYGEN SATURATION: 96 % | HEART RATE: 71 BPM | SYSTOLIC BLOOD PRESSURE: 179 MMHG

## 2024-05-07 PROCEDURE — 93000 ELECTROCARDIOGRAM COMPLETE: CPT

## 2024-05-07 PROCEDURE — 99204 OFFICE O/P NEW MOD 45 MIN: CPT

## 2024-05-07 RX ORDER — LABETALOL HYDROCHLORIDE 100 MG/1
100 TABLET, FILM COATED ORAL
Qty: 60 | Refills: 3 | Status: ACTIVE | COMMUNITY
Start: 2024-05-07 | End: 1900-01-01

## 2024-05-07 RX ORDER — SERTRALINE HYDROCHLORIDE 100 MG/1
100 TABLET, FILM COATED ORAL
Qty: 90 | Refills: 1 | Status: ACTIVE | COMMUNITY
Start: 2020-03-23 | End: 1900-01-01

## 2024-05-07 NOTE — ASSESSMENT
[FreeTextEntry1] : I have asked TIAGO to undergo detailed cardiac testing in order to evaluate her  overall cardiovascular risk. An assessment of both structural heart disease was recommended to the patient. In this regard, an echocardiogram was advised to the patient. I await the upcoming noninvasive cardiac testing in order to assess her overall cardiovascular risk. We discussed the pros and cons of plain treadmill stress testing nuclear stress testing and angiography.  Given an asymptomatic status and ischemic evaluation is deferred at the current time.  Given marked hypertension I have instituted labetalol 100 twice daily and fasting lipids are updated as well. We would hope to avoid hypotension in this age group and will therefore cautiously add a new antihypertensive to her regimen. We discussed current ACC guidelines, and the calculated 10-year risk is increased due to advanced age.  This represents a moderate amount of medical decision making based upon two or more chronic illnesses and the recommendation for a high-risk medication an antihypertensive   which requires ongoing monitoring and evaluation for tolerance efficacy and compliance. More than half of the face-to-face encounter of 45 minutes was spent in counseling the patient with respect to cardiovascular risk  Quality measures  Tobacco intervention not indicated Statin for prevention of cardiovascular disease possibly indicated Hypertension as above Aspirin for ischemic vascular disease not indicated Tobacco screening cessation and intervention not indicated  Medical necessity This is a high encounter based upon two or more chronic illnesses with mild exacerbation requiring further management and evaluation.  EKG is indicated for evaluation of hypertension  this patient has a high risk for major adverse cardiac events based on age risks benefits alternatives were discussed with the patient. all questions were answered to Her satisfaction.

## 2024-05-07 NOTE — REASON FOR VISIT
[CV Risk Factors and Non-Cardiac Disease] : CV risk factors and non-cardiac disease [Hypertension] : hypertension [FreeTextEntry3] : Dr. Zhang [FreeTextEntry1] :  AILYN RAYMUNDO comes today for evaluation. she was advised to undergo a complete cardiac evaluation. She denies chest pains shortness of breath or loss of consciousness.  Ailyn comes mostly today for evaluation of hypertension although we note some mild hyperlipidemia previously. Her blood pressure has been as high as 190 systolic. she is a retired  and was a prior smoker of less than 1 pack/day. Her mother  of old age as did father. She has a son who is without significant cardiac history

## 2024-05-19 LAB
ALBUMIN SERPL ELPH-MCNC: 4.4 G/DL
ALP BLD-CCNC: 53 U/L
ALT SERPL-CCNC: 16 U/L
ANION GAP SERPL CALC-SCNC: 14 MMOL/L
AST SERPL-CCNC: 22 U/L
BILIRUB SERPL-MCNC: 0.6 MG/DL
BUN SERPL-MCNC: 21 MG/DL
CALCIUM SERPL-MCNC: 9.3 MG/DL
CHLORIDE SERPL-SCNC: 105 MMOL/L
CHOLEST SERPL-MCNC: 294 MG/DL
CO2 SERPL-SCNC: 22 MMOL/L
CREAT SERPL-MCNC: 0.95 MG/DL
EGFR: 58 ML/MIN/1.73M2
GLUCOSE SERPL-MCNC: 89 MG/DL
HDLC SERPL-MCNC: 120 MG/DL
LDLC SERPL CALC-MCNC: 163 MG/DL
NONHDLC SERPL-MCNC: 174 MG/DL
POTASSIUM SERPL-SCNC: 4 MMOL/L
PROT SERPL-MCNC: 7.2 G/DL
SODIUM SERPL-SCNC: 140 MMOL/L
TRIGL SERPL-MCNC: 73 MG/DL

## 2024-05-23 RX ORDER — ATORVASTATIN CALCIUM 20 MG/1
20 TABLET, FILM COATED ORAL
Qty: 90 | Refills: 1 | Status: ACTIVE | COMMUNITY
Start: 2024-05-20 | End: 1900-01-01

## 2024-05-31 ENCOUNTER — APPOINTMENT (OUTPATIENT)
Dept: CARDIOLOGY | Facility: CLINIC | Age: 84
End: 2024-05-31
Payer: MEDICARE

## 2024-05-31 PROCEDURE — 93306 TTE W/DOPPLER COMPLETE: CPT

## 2024-06-06 ENCOUNTER — APPOINTMENT (OUTPATIENT)
Dept: CARDIOLOGY | Facility: CLINIC | Age: 84
End: 2024-06-06
Payer: MEDICARE

## 2024-06-06 VITALS
HEART RATE: 51 BPM | OXYGEN SATURATION: 51 % | DIASTOLIC BLOOD PRESSURE: 75 MMHG | HEIGHT: 64 IN | SYSTOLIC BLOOD PRESSURE: 175 MMHG | BODY MASS INDEX: 23.05 KG/M2 | WEIGHT: 135 LBS

## 2024-06-06 PROCEDURE — 99213 OFFICE O/P EST LOW 20 MIN: CPT

## 2024-06-06 PROCEDURE — 93000 ELECTROCARDIOGRAM COMPLETE: CPT

## 2024-06-06 RX ORDER — LOSARTAN POTASSIUM 100 MG/1
100 TABLET, FILM COATED ORAL
Qty: 90 | Refills: 0 | Status: ACTIVE | COMMUNITY
Start: 2024-06-06 | End: 1900-01-01

## 2024-06-06 RX ORDER — LOSARTAN POTASSIUM 50 MG/1
50 TABLET, FILM COATED ORAL
Qty: 90 | Refills: 3 | Status: DISCONTINUED | COMMUNITY
Start: 2024-04-23 | End: 2024-06-06

## 2024-06-06 NOTE — CARDIOLOGY SUMMARY
[de-identified] : 5/7/2024 normal [de-identified] : 5/31/2024 normal ejection fraction 61% systolic anterior motion of the mitral valve

## 2024-06-06 NOTE — REASON FOR VISIT
[CV Risk Factors and Non-Cardiac Disease] : CV risk factors and non-cardiac disease [Hypertension] : hypertension [FreeTextEntry3] : Dr. Zhang [FreeTextEntry1] :  AILYN RAYMUNDO comes today for evaluation. she was advised to undergo a complete cardiac evaluation. She denies chest pains shortness of breath or loss of consciousness.  Ailyn comes mostly today for evaluation of hypertension although we note some mild hyperlipidemia previously. Her blood pressure has been as high as 190 systolic. she is a retired  and was a prior smoker of less than 1 pack/day. Her mother  of old age as did father. She has a son who is without significant cardiac history  2024 Asymptomatic but advancing age renders patient increased cardiovascular risk would optimize antihypertensive and antilipemic echo reviewed with patient is satisfactory

## 2024-06-06 NOTE — ASSESSMENT
[FreeTextEntry1] : I have asked TIAGO to undergo detailed cardiac testing in order to evaluate her  overall cardiovascular risk. An assessment of both structural heart disease was recommended to the patient. In this regard, an echocardiogram was advised to the patient. I await the upcoming noninvasive cardiac testing in order to assess her overall cardiovascular risk. We discussed the pros and cons of plain treadmill stress testing nuclear stress testing and angiography.  Given an asymptomatic status and ischemic evaluation is deferred at the current time.  Given marked hypertension I have instituted labetalol 100 twice daily and fasting lipids are updated as well. We would hope to avoid hypotension in this age group and will therefore cautiously add a new antihypertensive to her regimen. We discussed current ACC guidelines, and the calculated 10-year risk is increased due to advanced age.  This represents a moderate amount of medical decision making based upon two or more chronic illnesses and the recommendation for a high-risk medication an antihypertensive   which requires ongoing monitoring and evaluation for tolerance efficacy and compliance. More than half of the face-to-face encounter of 45 minutes was spent in counseling the patient with respect to cardiovascular risk  Quality measures  Tobacco intervention not indicated Statin for prevention of cardiovascular disease possibly indicated Hypertension as above Aspirin for ischemic vascular disease not indicated Tobacco screening cessation and intervention not indicated   EKG is indicated for evaluation of hypertension  6/6/2024 Would increase losartan to 100 mg/day for better blood pressure control continue labetalol 100 twice daily continue atorvastatin 20 mg with LDL target less than 100 consider ischemic eval noted advancing age  Medical necessity This is a intermediate risk encounter based upon two or more chronic illnesses with mild exacerbation requiring further pharmacologic management and evaluation.   this patient has a high risk for major adverse cardiac events based on age risks benefits alternatives were discussed with the patient. all questions were answered to Her satisfaction.

## 2024-06-07 ENCOUNTER — NON-APPOINTMENT (OUTPATIENT)
Age: 84
End: 2024-06-07

## 2024-08-02 ENCOUNTER — NON-APPOINTMENT (OUTPATIENT)
Age: 84
End: 2024-08-02

## 2024-08-02 ENCOUNTER — APPOINTMENT (OUTPATIENT)
Dept: CARDIOLOGY | Facility: CLINIC | Age: 84
End: 2024-08-02
Payer: MEDICARE

## 2024-08-02 VITALS
HEIGHT: 64 IN | DIASTOLIC BLOOD PRESSURE: 80 MMHG | HEART RATE: 59 BPM | RESPIRATION RATE: 18 BRPM | BODY MASS INDEX: 23.56 KG/M2 | OXYGEN SATURATION: 95 % | SYSTOLIC BLOOD PRESSURE: 168 MMHG | WEIGHT: 138 LBS

## 2024-08-02 DIAGNOSIS — I10 ESSENTIAL (PRIMARY) HYPERTENSION: ICD-10-CM

## 2024-08-02 PROCEDURE — 99214 OFFICE O/P EST MOD 30 MIN: CPT

## 2024-08-02 PROCEDURE — 93000 ELECTROCARDIOGRAM COMPLETE: CPT

## 2024-08-04 NOTE — ASSESSMENT
[FreeTextEntry1] : I have asked TIAGO to undergo detailed cardiac testing in order to evaluate her  overall cardiovascular risk. An assessment of both structural heart disease was recommended to the patient. In this regard, an echocardiogram was advised to the patient. I await the upcoming noninvasive cardiac testing in order to assess her overall cardiovascular risk. We discussed the pros and cons of plain treadmill stress testing nuclear stress testing and angiography.  Given an asymptomatic status and ischemic evaluation is deferred at the current time.  Given marked hypertension I have instituted labetalol 100 twice daily and fasting lipids are updated as well. We would hope to avoid hypotension in this age group and will therefore cautiously add a new antihypertensive to her regimen. We discussed current ACC guidelines, and the calculated 10-year risk is increased due to advanced age.  This represents a moderate amount of medical decision making based upon two or more chronic illnesses and the recommendation for a high-risk medication an antihypertensive   which requires ongoing monitoring and evaluation for tolerance efficacy and compliance. More than half of the face-to-face encounter of 45 minutes was spent in counseling the patient with respect to cardiovascular risk  8/2/24 will increase labetalol to affect targets.   Quality measures  Tobacco intervention not indicated Statin for prevention of cardiovascular disease possibly indicated Hypertension as above Aspirin for ischemic vascular disease not indicated Tobacco screening cessation and intervention not indicated   EKG is indicated for evaluation of hypertension  6/6/2024 Would increase losartan to 100 mg/day for better blood pressure control continue labetalol 100 twice daily continue atorvastatin 20 mg with LDL target less than 100 consider ischemic eval noted advancing age  Medical necessity This is a intermediate risk encounter based upon two or more chronic illnesses with mild exacerbation requiring further pharmacologic management and evaluation.   this patient has a high risk for major adverse cardiac events based on age risks benefits alternatives were discussed with the patient. all questions were answered to Her satisfaction.

## 2024-08-04 NOTE — REASON FOR VISIT
[CV Risk Factors and Non-Cardiac Disease] : CV risk factors and non-cardiac disease [Hypertension] : hypertension [FreeTextEntry3] : Dr. Zhang [FreeTextEntry1] :  AILYN RAYMUNDO comes today for evaluation. she was advised to undergo a complete cardiac evaluation. She denies chest pains shortness of breath or loss of consciousness.  Ailyn comes mostly today for evaluation of hypertension although we note some mild hyperlipidemia previously. Her blood pressure has been as high as 190 systolic. she is a retired  and was a prior smoker of less than 1 pack/day. Her mother  of old age as did father. She has a son who is without significant cardiac history  2024 Asymptomatic but advancing age renders patient increased cardiovascular risk would optimize antihypertensive and antilipemic echo reviewed with patient is satisfactory  22 appears younger than stated age.

## 2024-08-04 NOTE — CARDIOLOGY SUMMARY
[de-identified] : 5/7/2024 normal [de-identified] : 5/31/2024 normal ejection fraction 61% systolic anterior motion of the mitral valve

## 2024-08-19 ENCOUNTER — RX RENEWAL (OUTPATIENT)
Age: 84
End: 2024-08-19

## 2024-09-03 ENCOUNTER — RX RENEWAL (OUTPATIENT)
Age: 84
End: 2024-09-03

## 2024-09-24 ENCOUNTER — APPOINTMENT (OUTPATIENT)
Dept: GASTROENTEROLOGY | Facility: CLINIC | Age: 84
End: 2024-09-24
Payer: MEDICARE

## 2024-09-24 VITALS
DIASTOLIC BLOOD PRESSURE: 77 MMHG | HEART RATE: 55 BPM | RESPIRATION RATE: 16 BRPM | HEIGHT: 64 IN | SYSTOLIC BLOOD PRESSURE: 176 MMHG | BODY MASS INDEX: 23.56 KG/M2 | WEIGHT: 138 LBS | TEMPERATURE: 98 F

## 2024-09-24 DIAGNOSIS — K52.832 LYMPHOCYTIC COLITIS: ICD-10-CM

## 2024-09-24 PROCEDURE — G2211 COMPLEX E/M VISIT ADD ON: CPT

## 2024-09-24 PROCEDURE — 99214 OFFICE O/P EST MOD 30 MIN: CPT

## 2024-09-24 NOTE — PHYSICAL EXAM
[Alert] : alert [Normal Voice/Communication] : normal voice/communication [Healthy Appearing] : healthy appearing [No Acute Distress] : no acute distress [Sclera] : the sclera and conjunctiva were normal [Hearing Threshold Finger Rub Not [place]] : hearing was normal [Normal Lips/Gums] : the lips and gums were normal [Oropharynx] : the oropharynx was normal [Normal Appearance] : the appearance of the neck was normal [No Neck Mass] : no neck mass was observed [No Respiratory Distress] : no respiratory distress [No Acc Muscle Use] : no accessory muscle use [Respiration, Rhythm And Depth] : normal respiratory rhythm and effort [Auscultation Breath Sounds / Voice Sounds] : lungs were clear to auscultation bilaterally [Heart Rate And Rhythm] : heart rate was normal and rhythm regular [Normal S1, S2] : normal S1 and S2 [Murmurs] : no murmurs [Bowel Sounds] : normal bowel sounds [Abdomen Tenderness] : non-tender [No Masses] : no abdominal mass palpated [Abdomen Soft] : soft [] : no hepatosplenomegaly [No CVA Tenderness] : no CVA  tenderness [Involuntary Movements] : no involuntary movements were seen [Normal Color / Pigmentation] : normal skin color and pigmentation [No Focal Deficits] : no focal deficits [Oriented To Time, Place, And Person] : oriented to person, place, and time

## 2024-09-24 NOTE — ASSESSMENT
[FreeTextEntry1] : unclear if this is really microscopic colitis vs a hypersensitive gastrocolic reflex, lactose intolerance, or post-operative changes +/- age-related issues with continence.  I advised her that the biopsies were negative for microscopic colitis. However the patient feels that the budesonide did help her so I will prescribe another course. I did advise her that this should not be continued after this and that if her symptoms recurred we should consider colonoscopy at that time. She agrees with this plan.  Follow up in 3 months. She agrees.

## 2024-09-24 NOTE — REVIEW OF SYSTEMS
[As Noted in HPI] : as noted in HPI [Negative] : Heme/Lymph [FreeTextEntry7] : altered bowel pattern per HPI

## 2024-09-24 NOTE — HISTORY OF PRESENT ILLNESS
[FreeTextEntry1] : Patient presents for follow up visit after flexible sigmoidoscopy. She was given budesonide empirically by me after the sigmoidoscopy as grossly there was no visible inflammation. She reports that her symptoms improved after this.  She took the budesonide to completion. She reports that she is having recurrence of diarrhea. On further inquiry she describes her 'diarrhea' having to run to the bathroom and have a bowel movement after lunch, once daily, since stopping the budesonide. She reports drinking milk with lunch daily (lactaid). She denies watery stools or more frequent BMs. Discussed the biopsy results with the patient - no evidence of microscopic colitis on the sigmoid colon biopsies or rectal biopsies.  [de-identified] : Sigmoidoscopy Report    Date: 2024 11:00 AM    Patient Name: TIAGO RAYMUNDO    MRN: [id number]    Account Number:    [id number]    Gender: Female     (age): 1940 (83)    Instrument(s):    [id number] - PCF [id number])    Attending/Fellow:    Tom Couch DO    Technician:    Margaret rGayson    Procedure Room #:    Procedure Room 1    Referring Physician:    CLAUDE M. BRIDGES    [address] [place] [id number]    [phone] (phone)    PCP:    CLAUDE M. BRIDGES    Anesthesia Provider:    Gladys Colin MD    Nurse(s):    Margareth Ortiz RN    ASA Class:    P2 - 2024 11:00 AM Gladys Colin MD    Administered Medications:    As per Anesthesiology Record    Indications:    Altered bowel habits - R19.4    History of microscopic colitis - K52.839    Procedure:    The procedure, indications, preparation and potential complications were  explained to the patient, who indicated understanding and signed the  corresponding consent forms. MAC was administered by anesthesiologist Continuous  pulse oximetry and blood pressure monitoring were used throughout the procedure.  Supplemental oxygen was used. The quality of preparation was inadequate - BBP  score N/A. Patient was placed in left lateral decubitus position. The  colonoscope was introduced through the rectum and advanced under direct  visualization until sigmoid colon reached at a distance of 40 cm. Visualization  of sigmoid colon and rectum was poor. Retroflexion was not performed in the  rectum for this patient. Patient tolerance to the procedure was excellent. The  procedure was not difficult. The colonoscope was withdrawn in a thorough  circumferential fashion with careful view of the entire colon. Due to poor prep,  mucosal views were limited Blood loss was minimal. The colonoscope was withdrawn  in a thorough circumferential fashion with careful view of the entire colon. Due  to poor prep, mucosal views were limited    Limitations/Complications:    Prep was suboptimal.    Findings:    Contents Stool was seen in the sigmoid colon and rectum. Mucosal views were  limited due to poor prep.    Mucosa Normal mucosa was noted in the sigmoid colon and rectum.Multiple cold  forceps biopsies were performed for histology in the sigmoid colon and rectum.  This was done to evaluate for microscopic colitis.    Impressions:    Stool in the sigmoid colon and rectum.    Normal mucosa in the sigmoid colon and rectum. (Biopsy).    Plan:    Resume Previous Diet    Avoid NSAIDs for 10 days    Resume all previous medications    Discharge to home    Patient will call office if any worrisome complaints, anticipatory guidance  discussed    Follow-up office visit in 1 month. Trial of budesonide 9mg daily for 8-12 weeks.    Await pathology results.    Tom Couch DO    Version 1, Electronically signed on 2024 12:04:23 PM by Tom Couch DO

## 2024-11-07 ENCOUNTER — APPOINTMENT (OUTPATIENT)
Dept: CARDIOLOGY | Facility: CLINIC | Age: 84
End: 2024-11-07
Payer: MEDICARE

## 2024-11-07 VITALS
OXYGEN SATURATION: 95 % | HEART RATE: 54 BPM | WEIGHT: 137 LBS | DIASTOLIC BLOOD PRESSURE: 80 MMHG | SYSTOLIC BLOOD PRESSURE: 157 MMHG | HEIGHT: 64 IN | BODY MASS INDEX: 23.39 KG/M2

## 2024-11-07 DIAGNOSIS — I10 ESSENTIAL (PRIMARY) HYPERTENSION: ICD-10-CM

## 2024-11-07 PROCEDURE — 93000 ELECTROCARDIOGRAM COMPLETE: CPT

## 2024-11-07 PROCEDURE — 99214 OFFICE O/P EST MOD 30 MIN: CPT

## 2024-11-14 ENCOUNTER — APPOINTMENT (OUTPATIENT)
Dept: DERMATOLOGY | Facility: CLINIC | Age: 84
End: 2024-11-14
Payer: MEDICARE

## 2024-11-14 DIAGNOSIS — Z85.828 PERSONAL HISTORY OF OTHER MALIGNANT NEOPLASM OF SKIN: ICD-10-CM

## 2024-11-14 DIAGNOSIS — L57.8 OTHER SKIN CHANGES DUE TO CHRONIC EXPOSURE TO NONIONIZING RADIATION: ICD-10-CM

## 2024-11-14 DIAGNOSIS — L82.1 OTHER SEBORRHEIC KERATOSIS: ICD-10-CM

## 2024-11-14 DIAGNOSIS — S80.811A ABRASION, RIGHT LOWER LEG, INITIAL ENCOUNTER: ICD-10-CM

## 2024-11-14 DIAGNOSIS — L57.0 ACTINIC KERATOSIS: ICD-10-CM

## 2024-11-14 DIAGNOSIS — D69.2 OTHER NONTHROMBOCYTOPENIC PURPURA: ICD-10-CM

## 2024-11-14 PROCEDURE — 99203 OFFICE O/P NEW LOW 30 MIN: CPT

## 2024-12-12 ENCOUNTER — APPOINTMENT (OUTPATIENT)
Dept: DERMATOLOGY | Facility: CLINIC | Age: 84
End: 2024-12-12
Payer: MEDICARE

## 2024-12-12 DIAGNOSIS — D18.01 HEMANGIOMA OF SKIN AND SUBCUTANEOUS TISSUE: ICD-10-CM

## 2024-12-12 DIAGNOSIS — L92.9 GRANULOMATOUS DISORDER OF THE SKIN AND SUBCUTANEOUS TISSUE, UNSPECIFIED: ICD-10-CM

## 2024-12-12 DIAGNOSIS — L57.8 OTHER SKIN CHANGES DUE TO CHRONIC EXPOSURE TO NONIONIZING RADIATION: ICD-10-CM

## 2024-12-12 DIAGNOSIS — Z85.828 PERSONAL HISTORY OF OTHER MALIGNANT NEOPLASM OF SKIN: ICD-10-CM

## 2024-12-12 DIAGNOSIS — L82.1 OTHER SEBORRHEIC KERATOSIS: ICD-10-CM

## 2024-12-12 DIAGNOSIS — L57.0 ACTINIC KERATOSIS: ICD-10-CM

## 2024-12-12 PROCEDURE — 17000 DESTRUCT PREMALG LESION: CPT

## 2024-12-12 PROCEDURE — 99213 OFFICE O/P EST LOW 20 MIN: CPT | Mod: 25

## 2025-01-16 ENCOUNTER — APPOINTMENT (OUTPATIENT)
Dept: DERMATOLOGY | Facility: CLINIC | Age: 85
End: 2025-01-16

## 2025-01-31 ENCOUNTER — LABORATORY RESULT (OUTPATIENT)
Age: 85
End: 2025-01-31

## 2025-01-31 ENCOUNTER — APPOINTMENT (OUTPATIENT)
Dept: INTERNAL MEDICINE | Facility: CLINIC | Age: 85
End: 2025-01-31
Payer: MEDICARE

## 2025-01-31 VITALS
OXYGEN SATURATION: 96 % | HEART RATE: 57 BPM | RESPIRATION RATE: 16 BRPM | WEIGHT: 130 LBS | HEIGHT: 64 IN | DIASTOLIC BLOOD PRESSURE: 72 MMHG | TEMPERATURE: 97.4 F | SYSTOLIC BLOOD PRESSURE: 154 MMHG | BODY MASS INDEX: 22.2 KG/M2

## 2025-01-31 DIAGNOSIS — F32.A DEPRESSION, UNSPECIFIED: ICD-10-CM

## 2025-01-31 DIAGNOSIS — Z00.00 ENCOUNTER FOR GENERAL ADULT MEDICAL EXAMINATION W/OUT ABNORMAL FINDINGS: ICD-10-CM

## 2025-01-31 DIAGNOSIS — D75.89 OTHER SPECIFIED DISEASES OF BLOOD AND BLOOD-FORMING ORGANS: ICD-10-CM

## 2025-01-31 DIAGNOSIS — K52.832 LYMPHOCYTIC COLITIS: ICD-10-CM

## 2025-01-31 DIAGNOSIS — I10 ESSENTIAL (PRIMARY) HYPERTENSION: ICD-10-CM

## 2025-01-31 DIAGNOSIS — R76.8 OTHER SPECIFIED ABNORMAL IMMUNOLOGICAL FINDINGS IN SERUM: ICD-10-CM

## 2025-01-31 DIAGNOSIS — D47.2 MONOCLONAL GAMMOPATHY: ICD-10-CM

## 2025-01-31 DIAGNOSIS — R79.89 OTHER SPECIFIED ABNORMAL FINDINGS OF BLOOD CHEMISTRY: ICD-10-CM

## 2025-01-31 LAB
25(OH)D3 SERPL-MCNC: 65.1 NG/ML
ALBUMIN SERPL ELPH-MCNC: 4.1 G/DL
ALP BLD-CCNC: 62 U/L
ALT SERPL-CCNC: 19 U/L
ANION GAP SERPL CALC-SCNC: 15 MMOL/L
AST SERPL-CCNC: 22 U/L
BASOPHILS # BLD AUTO: 0.06 K/UL
BASOPHILS NFR BLD AUTO: 0.7 %
BILIRUB SERPL-MCNC: 0.6 MG/DL
BUN SERPL-MCNC: 17 MG/DL
CALCIUM SERPL-MCNC: 9 MG/DL
CHLORIDE SERPL-SCNC: 106 MMOL/L
CHOLEST SERPL-MCNC: 244 MG/DL
CO2 SERPL-SCNC: 20 MMOL/L
CREAT SERPL-MCNC: 0.96 MG/DL
CREAT SPEC-SCNC: 118 MG/DL
EGFR: 58 ML/MIN/1.73M2
EOSINOPHIL # BLD AUTO: 0.18 K/UL
EOSINOPHIL NFR BLD AUTO: 2.2 %
ESTIMATED AVERAGE GLUCOSE: 108 MG/DL
FOLATE SERPL-MCNC: >20 NG/ML
GLUCOSE SERPL-MCNC: 102 MG/DL
HBA1C MFR BLD HPLC: 5.4 %
HCT VFR BLD CALC: 41 %
HDLC SERPL-MCNC: 110 MG/DL
HGB BLD-MCNC: 13.5 G/DL
IMM GRANULOCYTES NFR BLD AUTO: 0.5 %
LDLC SERPL CALC-MCNC: 120 MG/DL
LYMPHOCYTES # BLD AUTO: 1.13 K/UL
LYMPHOCYTES NFR BLD AUTO: 13.7 %
MAN DIFF?: NORMAL
MCHC RBC-ENTMCNC: 32.9 G/DL
MCHC RBC-ENTMCNC: 33.4 PG
MCV RBC AUTO: 101.5 FL
MICROALBUMIN 24H UR DL<=1MG/L-MCNC: 4.2 MG/DL
MICROALBUMIN/CREAT 24H UR-RTO: 35 MG/G
MONOCYTES # BLD AUTO: 0.82 K/UL
MONOCYTES NFR BLD AUTO: 9.9 %
NEUTROPHILS # BLD AUTO: 6.02 K/UL
NEUTROPHILS NFR BLD AUTO: 73 %
NONHDLC SERPL-MCNC: 134 MG/DL
PLATELET # BLD AUTO: 234 K/UL
POTASSIUM SERPL-SCNC: 4.2 MMOL/L
PROT SERPL-MCNC: 6.8 G/DL
RBC # BLD: 4.04 M/UL
RBC # FLD: 13.6 %
SODIUM SERPL-SCNC: 142 MMOL/L
TRIGL SERPL-MCNC: 87 MG/DL
TSH SERPL-ACNC: 8.1 UIU/ML
VIT B12 SERPL-MCNC: 1514 PG/ML
WBC # FLD AUTO: 8.25 K/UL

## 2025-01-31 PROCEDURE — 99213 OFFICE O/P EST LOW 20 MIN: CPT

## 2025-02-03 LAB
APPEARANCE: CLEAR
BILIRUBIN URINE: NEGATIVE
BLOOD URINE: NEGATIVE
COLOR: YELLOW
DEPRECATED KAPPA LC FREE/LAMBDA SER: 1.48 RATIO
GLUCOSE QUALITATIVE U: NEGATIVE MG/DL
IGA SER QL IEP: 129 MG/DL
IGG SER QL IEP: 982 MG/DL
IGM SER QL IEP: 105 MG/DL
KAPPA LC CSF-MCNC: 1.24 MG/DL
KAPPA LC SERPL-MCNC: 1.84 MG/DL
KETONES URINE: NEGATIVE MG/DL
LEUKOCYTE ESTERASE URINE: ABNORMAL
NITRITE URINE: NEGATIVE
PH URINE: 6.5
PROTEIN URINE: NORMAL MG/DL
SPECIFIC GRAVITY URINE: 1.02
UROBILINOGEN URINE: 0.2 MG/DL

## 2025-02-05 ENCOUNTER — NON-APPOINTMENT (OUTPATIENT)
Age: 85
End: 2025-02-05

## 2025-02-05 LAB — M PROTEIN SPEC IFE-MCNC: NORMAL

## 2025-02-21 ENCOUNTER — APPOINTMENT (OUTPATIENT)
Dept: DERMATOLOGY | Facility: CLINIC | Age: 85
End: 2025-02-21
Payer: MEDICARE

## 2025-02-21 ENCOUNTER — APPOINTMENT (OUTPATIENT)
Dept: INTERNAL MEDICINE | Facility: CLINIC | Age: 85
End: 2025-02-21
Payer: MEDICARE

## 2025-02-21 VITALS
HEIGHT: 64 IN | RESPIRATION RATE: 16 BRPM | SYSTOLIC BLOOD PRESSURE: 120 MMHG | WEIGHT: 131 LBS | OXYGEN SATURATION: 93 % | HEART RATE: 61 BPM | DIASTOLIC BLOOD PRESSURE: 70 MMHG | TEMPERATURE: 97.3 F | BODY MASS INDEX: 22.36 KG/M2

## 2025-02-21 DIAGNOSIS — D75.89 OTHER SPECIFIED DISEASES OF BLOOD AND BLOOD-FORMING ORGANS: ICD-10-CM

## 2025-02-21 DIAGNOSIS — Z00.00 ENCOUNTER FOR GENERAL ADULT MEDICAL EXAMINATION W/OUT ABNORMAL FINDINGS: ICD-10-CM

## 2025-02-21 DIAGNOSIS — D47.2 MONOCLONAL GAMMOPATHY: ICD-10-CM

## 2025-02-21 DIAGNOSIS — F32.A DEPRESSION, UNSPECIFIED: ICD-10-CM

## 2025-02-21 DIAGNOSIS — I10 ESSENTIAL (PRIMARY) HYPERTENSION: ICD-10-CM

## 2025-02-21 DIAGNOSIS — K52.832 LYMPHOCYTIC COLITIS: ICD-10-CM

## 2025-02-21 DIAGNOSIS — S80.811A ABRASION, RIGHT LOWER LEG, INITIAL ENCOUNTER: ICD-10-CM

## 2025-02-21 DIAGNOSIS — D48.7 NEOPLASM OF UNCERTAIN BEHAVIOR OF OTHER SPECIFIED SITES: ICD-10-CM

## 2025-02-21 DIAGNOSIS — R79.89 OTHER SPECIFIED ABNORMAL FINDINGS OF BLOOD CHEMISTRY: ICD-10-CM

## 2025-02-21 PROCEDURE — G0439: CPT

## 2025-02-21 PROCEDURE — 11102 TANGNTL BX SKIN SINGLE LES: CPT

## 2025-02-21 PROCEDURE — 99212 OFFICE O/P EST SF 10 MIN: CPT | Mod: 25

## 2025-02-26 ENCOUNTER — NON-APPOINTMENT (OUTPATIENT)
Age: 85
End: 2025-02-26

## 2025-02-26 LAB — CORE LAB BIOPSY: NORMAL

## 2025-03-12 ENCOUNTER — NON-APPOINTMENT (OUTPATIENT)
Age: 85
End: 2025-03-12

## 2025-04-07 ENCOUNTER — RX RENEWAL (OUTPATIENT)
Age: 85
End: 2025-04-07

## 2025-04-16 ENCOUNTER — APPOINTMENT (OUTPATIENT)
Dept: GASTROENTEROLOGY | Facility: CLINIC | Age: 85
End: 2025-04-16
Payer: MEDICARE

## 2025-04-16 VITALS
TEMPERATURE: 98.5 F | OXYGEN SATURATION: 94 % | HEIGHT: 64 IN | RESPIRATION RATE: 16 BRPM | HEART RATE: 60 BPM | BODY MASS INDEX: 22.2 KG/M2 | WEIGHT: 130 LBS | SYSTOLIC BLOOD PRESSURE: 140 MMHG | DIASTOLIC BLOOD PRESSURE: 80 MMHG

## 2025-04-16 DIAGNOSIS — R19.7 DIARRHEA, UNSPECIFIED: ICD-10-CM

## 2025-04-16 DIAGNOSIS — R19.4 CHANGE IN BOWEL HABIT: ICD-10-CM

## 2025-04-16 DIAGNOSIS — K52.832 LYMPHOCYTIC COLITIS: ICD-10-CM

## 2025-04-16 PROCEDURE — 99214 OFFICE O/P EST MOD 30 MIN: CPT

## 2025-04-16 PROCEDURE — G2211 COMPLEX E/M VISIT ADD ON: CPT

## 2025-04-29 ENCOUNTER — RX RENEWAL (OUTPATIENT)
Age: 85
End: 2025-04-29

## 2025-05-08 ENCOUNTER — NON-APPOINTMENT (OUTPATIENT)
Age: 85
End: 2025-05-08

## 2025-05-08 ENCOUNTER — APPOINTMENT (OUTPATIENT)
Dept: CARDIOLOGY | Facility: CLINIC | Age: 85
End: 2025-05-08
Payer: MEDICARE

## 2025-05-08 VITALS
RESPIRATION RATE: 16 BRPM | HEART RATE: 54 BPM | DIASTOLIC BLOOD PRESSURE: 78 MMHG | OXYGEN SATURATION: 96 % | WEIGHT: 130 LBS | BODY MASS INDEX: 22.2 KG/M2 | HEIGHT: 64 IN | SYSTOLIC BLOOD PRESSURE: 155 MMHG

## 2025-05-08 DIAGNOSIS — I10 ESSENTIAL (PRIMARY) HYPERTENSION: ICD-10-CM

## 2025-05-08 PROCEDURE — 99214 OFFICE O/P EST MOD 30 MIN: CPT

## 2025-05-08 PROCEDURE — 93000 ELECTROCARDIOGRAM COMPLETE: CPT

## 2025-07-02 ENCOUNTER — APPOINTMENT (OUTPATIENT)
Dept: GASTROENTEROLOGY | Facility: CLINIC | Age: 85
End: 2025-07-02
Payer: MEDICARE

## 2025-07-02 VITALS
SYSTOLIC BLOOD PRESSURE: 136 MMHG | WEIGHT: 131 LBS | OXYGEN SATURATION: 96 % | HEART RATE: 56 BPM | DIASTOLIC BLOOD PRESSURE: 77 MMHG | RESPIRATION RATE: 16 BRPM | HEIGHT: 64 IN | TEMPERATURE: 98.5 F | BODY MASS INDEX: 22.36 KG/M2

## 2025-07-02 PROCEDURE — 99213 OFFICE O/P EST LOW 20 MIN: CPT

## 2025-07-28 ENCOUNTER — APPOINTMENT (OUTPATIENT)
Dept: INTERNAL MEDICINE | Facility: CLINIC | Age: 85
End: 2025-07-28
Payer: MEDICARE

## 2025-07-28 VITALS
BODY MASS INDEX: 21.85 KG/M2 | RESPIRATION RATE: 14 BRPM | DIASTOLIC BLOOD PRESSURE: 70 MMHG | HEIGHT: 64 IN | OXYGEN SATURATION: 97 % | SYSTOLIC BLOOD PRESSURE: 116 MMHG | HEART RATE: 61 BPM | TEMPERATURE: 98.2 F | WEIGHT: 128 LBS

## 2025-07-28 DIAGNOSIS — R79.89 OTHER SPECIFIED ABNORMAL FINDINGS OF BLOOD CHEMISTRY: ICD-10-CM

## 2025-07-28 DIAGNOSIS — R26.9 UNSPECIFIED ABNORMALITIES OF GAIT AND MOBILITY: ICD-10-CM

## 2025-07-28 DIAGNOSIS — D75.89 OTHER SPECIFIED DISEASES OF BLOOD AND BLOOD-FORMING ORGANS: ICD-10-CM

## 2025-07-28 DIAGNOSIS — K52.832 LYMPHOCYTIC COLITIS: ICD-10-CM

## 2025-07-28 DIAGNOSIS — E78.5 HYPERLIPIDEMIA, UNSPECIFIED: ICD-10-CM

## 2025-07-28 PROCEDURE — 99214 OFFICE O/P EST MOD 30 MIN: CPT

## 2025-08-28 ENCOUNTER — LABORATORY RESULT (OUTPATIENT)
Age: 85
End: 2025-08-28

## 2025-09-10 ENCOUNTER — NON-APPOINTMENT (OUTPATIENT)
Age: 85
End: 2025-09-10

## (undated) DEVICE — LAP PAD 18 X 18"

## (undated) DEVICE — WARMING BLANKET UPPER ADULT

## (undated) DEVICE — KIT ENDO PROCEDURE CUST W/VLV

## (undated) DEVICE — ENDOCUFF VISION SZ 2 LG GRN

## (undated) DEVICE — TUBING CAP SET ERBEFLO CLEVERCAP HYBRID CO2 FOR OLYMPUS SCOPES AND UCR

## (undated) DEVICE — SOLIDIFIER CANN EXPRESS 3K

## (undated) DEVICE — CONTAINER FORMALIN BUFF 10% 60ML

## (undated) DEVICE — SUT POLYSORB 1 36" GS-21 UNDYED

## (undated) DEVICE — DRSG ACE BANDAGE 4" NS

## (undated) DEVICE — PLATE NESSY 170

## (undated) DEVICE — POSITIONER FOAM HEAD CRADLE (PINK)

## (undated) DEVICE — SUT POLYSORB 0 30" GS-21 UNDYED

## (undated) DEVICE — DRAPE C ARM C-ARMOUR

## (undated) DEVICE — DRAPE INSTRUMENT POUCH 6.75" X 11"

## (undated) DEVICE — DRILL BIT STRYKER ORTHO 4.2 X 340MM

## (undated) DEVICE — CLIP CLSD TB GAMMA III

## (undated) DEVICE — GLV 8 PROTEXIS (BLUE)

## (undated) DEVICE — PREP CHLORAPREP HI-LITE ORANGE 26ML

## (undated) DEVICE — SOL IRR POUR H2O 1000ML

## (undated) DEVICE — REAMER STRYKER ORTHO SHAFT BIXCUT MOD 450MM

## (undated) DEVICE — FORCEP RADIAL JAW 4 240CM DISP

## (undated) DEVICE — GLV 8 PROTEXIS (WHITE)

## (undated) DEVICE — SOL IRR POUR H2O 1500ML

## (undated) DEVICE — VENODYNE/SCD SLEEVE CALF LARGE

## (undated) DEVICE — POLY TRAP ETRAP

## (undated) DEVICE — SNARE EXACTO COLD 9MMX230CM

## (undated) DEVICE — SUT POLYSORB 2-0 30" GS-21 UNDYED

## (undated) DEVICE — DRAPE ISOLATION 125X83"

## (undated) DEVICE — DRSG AQUACEL 3.5 X 4"

## (undated) DEVICE — POSITIONER STRAP ARMBOARD VELCRO TS-30

## (undated) DEVICE — PACK MINOR

## (undated) DEVICE — SNARE POLYP SENS SM 13MM 240CM

## (undated) DEVICE — DRAPE TOWEL BLUE 17" X 24"

## (undated) DEVICE — SOL IRR POUR NS 0.9% 500ML